# Patient Record
Sex: FEMALE | Employment: OTHER | URBAN - METROPOLITAN AREA
[De-identification: names, ages, dates, MRNs, and addresses within clinical notes are randomized per-mention and may not be internally consistent; named-entity substitution may affect disease eponyms.]

---

## 2017-01-01 ENCOUNTER — HOSPITAL ENCOUNTER (OUTPATIENT)
Dept: LAB | Age: 82
Discharge: HOME | End: 2017-09-27
Payer: MEDICARE

## 2017-01-01 ENCOUNTER — OFFICE VISIT (OUTPATIENT)
Dept: FAMILY MEDICINE CLINIC | Age: 82
End: 2017-01-01

## 2017-01-01 ENCOUNTER — TELEPHONE (OUTPATIENT)
Dept: FAMILY MEDICINE CLINIC | Age: 82
End: 2017-01-01

## 2017-01-01 ENCOUNTER — HOSPITAL ENCOUNTER (OUTPATIENT)
Dept: LAB | Age: 82
Discharge: HOME | End: 2017-12-18
Payer: MEDICARE

## 2017-01-01 ENCOUNTER — HOSPITAL ENCOUNTER (OUTPATIENT)
Dept: LAB | Age: 82
Discharge: HOME | End: 2017-11-20
Payer: MEDICARE

## 2017-01-01 VITALS
WEIGHT: 161 LBS | SYSTOLIC BLOOD PRESSURE: 130 MMHG | OXYGEN SATURATION: 99 % | TEMPERATURE: 97.6 F | BODY MASS INDEX: 27.49 KG/M2 | HEIGHT: 64 IN | DIASTOLIC BLOOD PRESSURE: 78 MMHG | RESPIRATION RATE: 18 BRPM | HEART RATE: 80 BPM

## 2017-01-01 VITALS
BODY MASS INDEX: 27.49 KG/M2 | HEART RATE: 81 BPM | DIASTOLIC BLOOD PRESSURE: 64 MMHG | RESPIRATION RATE: 17 BRPM | TEMPERATURE: 98.7 F | OXYGEN SATURATION: 96 % | WEIGHT: 161 LBS | HEIGHT: 64 IN | SYSTOLIC BLOOD PRESSURE: 138 MMHG

## 2017-01-01 DIAGNOSIS — I95.1 ORTHOSTATIC HYPOTENSION: ICD-10-CM

## 2017-01-01 DIAGNOSIS — T50.905A ADVERSE EFFECT OF DRUG, INITIAL ENCOUNTER: ICD-10-CM

## 2017-01-01 DIAGNOSIS — E11.9 TYPE 2 DIABETES MELLITUS WITHOUT COMPLICATION, WITHOUT LONG-TERM CURRENT USE OF INSULIN (HCC): ICD-10-CM

## 2017-01-01 DIAGNOSIS — G25.81 RLS (RESTLESS LEGS SYNDROME): ICD-10-CM

## 2017-01-01 DIAGNOSIS — I10 ESSENTIAL HYPERTENSION: ICD-10-CM

## 2017-01-01 DIAGNOSIS — E03.9 HYPOTHYROIDISM, UNSPECIFIED TYPE: ICD-10-CM

## 2017-01-01 DIAGNOSIS — G25.81 RLS (RESTLESS LEGS SYNDROME): Primary | ICD-10-CM

## 2017-01-01 DIAGNOSIS — I10 ESSENTIAL HYPERTENSION: Primary | ICD-10-CM

## 2017-01-01 DIAGNOSIS — R91.1 PULMONARY NODULE, RIGHT: ICD-10-CM

## 2017-01-01 DIAGNOSIS — N30.00 ACUTE CYSTITIS WITHOUT HEMATURIA: Primary | ICD-10-CM

## 2017-01-01 DIAGNOSIS — N30.00 ACUTE CYSTITIS WITHOUT HEMATURIA: ICD-10-CM

## 2017-01-01 LAB
ALBUMIN SERPL-MCNC: 3.9 G/DL (ref 3.4–5)
ALBUMIN/GLOB SERPL: 1.2 {RATIO} (ref 0.8–1.7)
ALP SERPL-CCNC: 85 U/L (ref 45–117)
ALT SERPL-CCNC: 24 U/L (ref 13–56)
ANION GAP SERPL CALC-SCNC: 10 MMOL/L (ref 3–18)
ANION GAP SERPL CALC-SCNC: 10 MMOL/L (ref 3–18)
AST SERPL-CCNC: 15 U/L (ref 15–37)
BACTERIA SPEC CULT: ABNORMAL
BILIRUB SERPL-MCNC: 0.4 MG/DL (ref 0.2–1)
BILIRUB UR QL STRIP: NEGATIVE
BUN SERPL-MCNC: 11 MG/DL (ref 7–18)
BUN SERPL-MCNC: 13 MG/DL (ref 7–18)
BUN/CREAT SERPL: 13 (ref 12–20)
BUN/CREAT SERPL: 13 (ref 12–20)
CALCIUM SERPL-MCNC: 8.8 MG/DL (ref 8.5–10.1)
CALCIUM SERPL-MCNC: 8.8 MG/DL (ref 8.5–10.1)
CHLORIDE SERPL-SCNC: 99 MMOL/L (ref 100–108)
CHLORIDE SERPL-SCNC: 99 MMOL/L (ref 100–108)
CHOLEST SERPL-MCNC: 201 MG/DL
CO2 SERPL-SCNC: 26 MMOL/L (ref 21–32)
CO2 SERPL-SCNC: 28 MMOL/L (ref 21–32)
CREAT SERPL-MCNC: 0.84 MG/DL (ref 0.6–1.3)
CREAT SERPL-MCNC: 1 MG/DL (ref 0.6–1.3)
EST. AVERAGE GLUCOSE BLD GHB EST-MCNC: 192 MG/DL
GLOBULIN SER CALC-MCNC: 3.3 G/DL (ref 2–4)
GLUCOSE SERPL-MCNC: 107 MG/DL (ref 74–99)
GLUCOSE SERPL-MCNC: 116 MG/DL (ref 74–99)
GLUCOSE UR-MCNC: NORMAL MG/DL
HBA1C MFR BLD: 8.3 % (ref 4.2–5.6)
HDLC SERPL-MCNC: 62 MG/DL (ref 40–60)
HDLC SERPL: 3.2 {RATIO} (ref 0–5)
KETONES P FAST UR STRIP-MCNC: NEGATIVE MG/DL
LDLC SERPL CALC-MCNC: 102.2 MG/DL (ref 0–100)
LIPID PROFILE,FLP: ABNORMAL
PH UR STRIP: 5.5 [PH] (ref 4.6–8)
POTASSIUM SERPL-SCNC: 4 MMOL/L (ref 3.5–5.5)
POTASSIUM SERPL-SCNC: 4 MMOL/L (ref 3.5–5.5)
PROT SERPL-MCNC: 7.2 G/DL (ref 6.4–8.2)
PROT UR QL STRIP: NORMAL
SERVICE CMNT-IMP: ABNORMAL
SODIUM SERPL-SCNC: 135 MMOL/L (ref 136–145)
SODIUM SERPL-SCNC: 137 MMOL/L (ref 136–145)
SP GR UR STRIP: 1.01 (ref 1–1.03)
TRIGL SERPL-MCNC: 184 MG/DL (ref ?–150)
UA UROBILINOGEN AMB POC: NORMAL (ref 0.2–1)
URINALYSIS CLARITY POC: CLEAR
URINALYSIS COLOR POC: YELLOW
URINE BLOOD POC: NORMAL
URINE LEUKOCYTES POC: NORMAL
URINE NITRITES POC: POSITIVE
VLDLC SERPL CALC-MCNC: 36.8 MG/DL

## 2017-01-01 PROCEDURE — 36415 COLL VENOUS BLD VENIPUNCTURE: CPT | Performed by: NURSE PRACTITIONER

## 2017-01-01 PROCEDURE — 80053 COMPREHEN METABOLIC PANEL: CPT | Performed by: NURSE PRACTITIONER

## 2017-01-01 PROCEDURE — 80061 LIPID PANEL: CPT | Performed by: NURSE PRACTITIONER

## 2017-01-01 PROCEDURE — 83036 HEMOGLOBIN GLYCOSYLATED A1C: CPT | Performed by: NURSE PRACTITIONER

## 2017-01-01 PROCEDURE — 87186 SC STD MICRODIL/AGAR DIL: CPT | Performed by: NURSE PRACTITIONER

## 2017-01-01 PROCEDURE — 87077 CULTURE AEROBIC IDENTIFY: CPT | Performed by: NURSE PRACTITIONER

## 2017-01-01 PROCEDURE — 87086 URINE CULTURE/COLONY COUNT: CPT | Performed by: NURSE PRACTITIONER

## 2017-01-01 PROCEDURE — 80048 BASIC METABOLIC PNL TOTAL CA: CPT | Performed by: NURSE PRACTITIONER

## 2017-01-01 RX ORDER — LOSARTAN POTASSIUM 50 MG/1
TABLET ORAL
Qty: 90 TAB | Refills: 1 | Status: SHIPPED | OUTPATIENT
Start: 2017-01-01 | End: 2018-01-01 | Stop reason: SDUPTHER

## 2017-01-01 RX ORDER — GABAPENTIN 300 MG/1
CAPSULE ORAL
Qty: 60 CAP | Refills: 0 | Status: SHIPPED | OUTPATIENT
Start: 2017-01-01 | End: 2017-01-01 | Stop reason: DRUGHIGH

## 2017-01-01 RX ORDER — ROPINIROLE 1 MG/1
TABLET, FILM COATED ORAL
Qty: 180 TAB | Refills: 2 | Status: SHIPPED | OUTPATIENT
Start: 2017-01-01 | End: 2017-01-01 | Stop reason: SINTOL

## 2017-01-01 RX ORDER — GABAPENTIN 300 MG/1
900 CAPSULE ORAL
Qty: 270 CAP | Refills: 2 | Status: SHIPPED | OUTPATIENT
Start: 2017-01-01 | End: 2017-01-01 | Stop reason: SDUPTHER

## 2017-01-01 RX ORDER — TRIMETHOPRIM 100 MG/1
100 TABLET ORAL DAILY
COMMUNITY
End: 2018-01-01

## 2017-01-01 RX ORDER — GABAPENTIN 300 MG/1
900 CAPSULE ORAL
Qty: 270 CAP | Refills: 2 | Status: SHIPPED | OUTPATIENT
Start: 2017-01-01

## 2017-01-01 RX ORDER — LEVOTHYROXINE SODIUM 100 UG/1
TABLET ORAL
Qty: 90 TAB | Refills: 3 | Status: SHIPPED | OUTPATIENT
Start: 2017-01-01

## 2017-01-01 RX ORDER — NITROFURANTOIN 25; 75 MG/1; MG/1
100 CAPSULE ORAL 2 TIMES DAILY
Qty: 14 CAP | Refills: 0 | Status: SHIPPED | OUTPATIENT
Start: 2017-01-01 | End: 2018-01-01

## 2017-01-13 ENCOUNTER — HOSPITAL ENCOUNTER (OUTPATIENT)
Dept: LAB | Age: 82
Discharge: HOME | End: 2017-01-13
Payer: MEDICARE

## 2017-01-13 DIAGNOSIS — E11.9 TYPE 2 DIABETES MELLITUS WITHOUT COMPLICATION, WITHOUT LONG-TERM CURRENT USE OF INSULIN (HCC): ICD-10-CM

## 2017-01-13 DIAGNOSIS — E03.9 HYPOTHYROIDISM, UNSPECIFIED TYPE: ICD-10-CM

## 2017-01-13 LAB
EST. AVERAGE GLUCOSE BLD GHB EST-MCNC: 243 MG/DL
HBA1C MFR BLD: 10.1 % (ref 4.2–5.6)
TSH SERPL DL<=0.05 MIU/L-ACNC: 2.61 UIU/ML (ref 0.36–3.74)

## 2017-01-13 PROCEDURE — 36415 COLL VENOUS BLD VENIPUNCTURE: CPT | Performed by: NURSE PRACTITIONER

## 2017-01-13 PROCEDURE — 83036 HEMOGLOBIN GLYCOSYLATED A1C: CPT | Performed by: NURSE PRACTITIONER

## 2017-01-13 PROCEDURE — 84443 ASSAY THYROID STIM HORMONE: CPT | Performed by: NURSE PRACTITIONER

## 2017-01-16 ENCOUNTER — TELEPHONE (OUTPATIENT)
Dept: FAMILY MEDICINE CLINIC | Age: 82
End: 2017-01-16

## 2017-01-16 NOTE — TELEPHONE ENCOUNTER
----- Message from Henri Linder NP sent at 1/16/2017  8:47 AM EST -----  Please call patient and advise that her hemoglobin A 1 C is 10.1% and ask if she is taking her medications and if yes what and how much. She is not at goal with her diabetes and I need to know what she is taking to better make decisions with her to get her hemoglobin A 1 C to less than 9 %  Note she has been non complaint with taking medications.    Henri MASONNP

## 2017-01-16 NOTE — TELEPHONE ENCOUNTER
LVM adv pt A1C results, request pt to call and adv if she is taking her meds and what and how much she is taking.

## 2017-03-10 PROBLEM — N95.2 ATROPHIC VAGINITIS: Status: ACTIVE | Noted: 2017-03-10

## 2017-03-26 RX ORDER — LOSARTAN POTASSIUM 100 MG/1
TABLET ORAL
Qty: 90 TAB | Refills: 1 | Status: SHIPPED | OUTPATIENT
Start: 2017-03-26 | End: 2017-04-13 | Stop reason: DRUGHIGH

## 2017-03-31 DIAGNOSIS — G25.81 RLS (RESTLESS LEGS SYNDROME): ICD-10-CM

## 2017-03-31 RX ORDER — ROPINIROLE 1 MG/1
TABLET, FILM COATED ORAL
Qty: 180 TAB | Refills: 2 | Status: SHIPPED | OUTPATIENT
Start: 2017-03-31 | End: 2017-01-01 | Stop reason: SDUPTHER

## 2017-04-13 ENCOUNTER — HOSPITAL ENCOUNTER (OUTPATIENT)
Dept: LAB | Age: 82
Discharge: HOME | End: 2017-04-13
Payer: MEDICARE

## 2017-04-13 ENCOUNTER — OFFICE VISIT (OUTPATIENT)
Dept: FAMILY MEDICINE CLINIC | Age: 82
End: 2017-04-13

## 2017-04-13 VITALS
OXYGEN SATURATION: 98 % | WEIGHT: 167 LBS | BODY MASS INDEX: 28.51 KG/M2 | HEIGHT: 64 IN | SYSTOLIC BLOOD PRESSURE: 110 MMHG | TEMPERATURE: 98.1 F | DIASTOLIC BLOOD PRESSURE: 70 MMHG | HEART RATE: 68 BPM | RESPIRATION RATE: 16 BRPM

## 2017-04-13 DIAGNOSIS — Z12.31 ENCOUNTER FOR SCREENING MAMMOGRAM FOR MALIGNANT NEOPLASM OF BREAST: ICD-10-CM

## 2017-04-13 DIAGNOSIS — E11.9 TYPE 2 DIABETES MELLITUS WITHOUT COMPLICATION, WITHOUT LONG-TERM CURRENT USE OF INSULIN (HCC): Primary | ICD-10-CM

## 2017-04-13 DIAGNOSIS — I10 ESSENTIAL HYPERTENSION: ICD-10-CM

## 2017-04-13 DIAGNOSIS — E11.9 TYPE 2 DIABETES MELLITUS WITHOUT COMPLICATION, WITHOUT LONG-TERM CURRENT USE OF INSULIN (HCC): ICD-10-CM

## 2017-04-13 DIAGNOSIS — I95.1 ORTHOSTATIC HYPOTENSION: ICD-10-CM

## 2017-04-13 LAB
CHOLEST SERPL-MCNC: 209 MG/DL
HDLC SERPL-MCNC: 54 MG/DL (ref 40–60)
HDLC SERPL: 3.9 {RATIO} (ref 0–5)
LDLC SERPL CALC-MCNC: 119.6 MG/DL (ref 0–100)
LIPID PROFILE,FLP: ABNORMAL
TRIGL SERPL-MCNC: 177 MG/DL (ref ?–150)
VLDLC SERPL CALC-MCNC: 35.4 MG/DL

## 2017-04-13 PROCEDURE — 36415 COLL VENOUS BLD VENIPUNCTURE: CPT | Performed by: NURSE PRACTITIONER

## 2017-04-13 PROCEDURE — 80061 LIPID PANEL: CPT | Performed by: NURSE PRACTITIONER

## 2017-04-13 RX ORDER — LOSARTAN POTASSIUM 50 MG/1
50 TABLET ORAL DAILY
Qty: 90 TAB | Refills: 1 | Status: SHIPPED | OUTPATIENT
Start: 2017-04-13 | End: 2017-01-01 | Stop reason: SDUPTHER

## 2017-04-13 NOTE — PROGRESS NOTES
1. Have you been to the ER, urgent care clinic since your last visit? Hospitalized since your last visit? No    2. Have you seen or consulted any other health care providers outside of the [facility] since your last visit? Include any pap smears or colon screening.  No

## 2017-04-13 NOTE — MR AVS SNAPSHOT
Visit Information Date & Time Provider Department Dept. Phone Encounter #  
 4/13/2017  9:40 AM Samuel Fabian, [address] [id number] Follow-up Instructions Return in about 4 weeks (around 5/11/2017) for blood pressure check since change in blood pressure. 6/28/2017  1:30 PM  
Any with Lisa Kelley MD  
[facility] ([facility]) Appt Note: 3 mo fu  
 [address] 
[address] [place] [address]  
[address] [facility] [address] Upcoming Health Maintenance Date Due  
 EYE EXAM RETINAL OR DILATED Q1 12/12/2017* Pneumococcal 65+ Low/Medium Risk (2 of 2 - PPSV23) 6/21/2017 HEMOGLOBIN A1C Q6M 7/13/2017 MICROALBUMIN Q1 8/9/2017 GLAUCOMA SCREENING Q2Y 10/27/2017 FOOT EXAM Q1 4/13/2018 BREAST CANCER SCRN MAMMOGRAM 4/13/2018 LIPID PANEL Q1 4/13/2018 MEDICARE YEARLY EXAM 4/14/2018 DTaP/Tdap/Td series (2 - Td) 4/13/2027 *Topic was postponed. The date shown is not the original due date. Allergies as of 4/13/2017  Review Complete On: 4/13/2017 By: Deshawn Chun LPN Severity Noted Reaction Type Reactions Phenergan [Promethazine] High 09/22/2014   Side Effect Other (comments) Made patient very hyper and extremely agitated Current Immunizations  Reviewed on 4/11/2016 Name Date Influenza Vaccine PF 9/16/2013 Influenza Vaccine Split 10/7/2011 Not reviewed this visit You Were Diagnosed With   
  
 Codes Comments Type 2 diabetes mellitus without complication, without long-term current use of insulin (HCC)    -  Primary ICD-10-CM: E11.9 ICD-9-CM: 250.00 Essential hypertension     ICD-10-CM: I10 
ICD-9-CM: 401.9 Encounter for screening mammogram for malignant neoplasm of breast     ICD-10-CM: Z12.31 
ICD-9-CM: V76.12 Orthostatic hypotension     ICD-10-CM: I95.1 ICD-9-CM: 458.0 Vitals BP Pulse Temp Resp Height(growth percentile) Weight(growth percentile) 110/70 (BP 1 Location: Right arm, BP Patient Position: Standing) 68 98.1 °F (36.7 °C) (Oral) 16 5' 4\" (1.626 m) 167 lb (75.8 kg) SpO2 BMI OB Status Smoking Status 98% 28.67 kg/m2 Postmenopausal Former Smoker Vitals History BMI and BSA Data Body Mass Index Body Surface Area  
 28.67 kg/m 2 1.85 m 2 Preferred Pharmacy Pharmacy Name Phone [address] [facility] [phone] Your Updated Medication List  
  
   
This list is accurate as of: 4/13/17 10:12 AM.  Always use your most recent med list.  
  
  
  
  
 aspirin delayed-release 81 mg tablet Take 81 mg by mouth daily. BIOTIN PO Take  by mouth. CALTRATE 600 PO Take  by mouth. conjugated estrogens 0.625 mg/gram vaginal cream  
Commonly known as:  PREMARIN Insert 0.5 g into vagina every seven (7) days. escitalopram oxalate 10 mg tablet Commonly known as:  Tod Samson TAKE 1 TABLET DAILY  
  
 hydroCHLOROthiazide 25 mg tablet Commonly known as:  HYDRODIURIL  
TAKE 1 TABLET DAILY  
  
 levothyroxine 100 mcg tablet Commonly known as:  SYNTHROID Take 1 Tab by mouth Daily (before breakfast). LORazepam 0.5 mg tablet Commonly known as:  ATIVAN Take 1 Tab by mouth every eight (8) hours as needed for Anxiety. losartan 50 mg tablet Commonly known as:  COZAAR Take 1 Tab by mouth daily. metFORMIN 500 mg tablet Commonly known as:  GLUCOPHAGE  
TAKE 1 TABLET TWICE A DAY WITH MEALS  
  
 phenazopyridine 100 mg tablet Commonly known as:  PYRIDIUM Take 1 Tab by mouth three (3) times daily as needed for Pain. PRESERVISION AREDS PO Take  by mouth. red yeast rice extract 600 mg Cap Take 600 mg by mouth now. rOPINIRole 1 mg tablet Commonly known as:  REQUIP  
TAKE 1 TABLET TWICE A DAY  
  
  
  
  
 Prescriptions Sent to Pharmacy Refills  
 losartan (COZAAR) 50 mg tablet 1 Sig: Take 1 Tab by mouth daily. Class: Normal  
 Pharmacy: [address], [address] [place] [address] #: [phone] Route: Oral  
  
We Performed the Following  DIABETES FOOT EXAM [7 Custom] Follow-up Instructions Return in about 4 weeks (around 5/11/2017) for blood pressure check since change in blood pressure. To-Do List   
 04/13/2017 Lab:  LIPID PANEL   
  
 04/16/2017 Imaging:  [facility] MAMMO BI SCREENING INCL CAD Patient Instructions Benign Paroxysmal Positional Vertigo (BPPV): Care Instructions Your Care Instructions Benign paroxysmal positional vertigo, also called BPPV, is an inner ear problem. It causes a spinning or whirling sensation when you move your head. This sensation is called vertigo. The vertigo usually lasts for less than a minute. People often have vertigo spells for a few days or weeks. Then the vertigo goes away. But it may come back again. The vertigo may be mild, or it may be bad enough to cause unsteadiness, nausea, and vomiting. When you move, your inner ear sends messages to the brain. This helps you keep your balance. Vertigo can happen when debris builds up in the inner ear. The buildup can cause the inner ear to send the wrong message to the brain. Your doctor may move you in different positions to help your vertigo get better faster. This is called the Epley maneuver. Your doctor may also prescribe medicines or exercises to help with your symptoms. Follow-up care is a key part of your treatment and safety. Be sure to make and go to all appointments, and call your doctor if you are having problems. It's also a good idea to know your test results and keep a list of the medicines you take. How can you care for yourself at home? · If your doctor suggests that you do Adkins-Daroff exercises: ¨ Sit on the edge of a bed or sofa. Quickly lie down on the side that causes the worst vertigo. Lie on your side with your ear down. ¨ Stay in this position for at least 30 seconds or until the vertigo goes away. ¨ Sit up. If this causes vertigo, wait for it to stop. ¨ Repeat the procedure on the other side. ¨ Repeat this 10 times. Do these exercises 2 times a day until the vertigo is gone. When should you call for help? Call 911 anytime you think you may need emergency care. For example, call if: 
· You have symptoms of a stroke. These may include: 
¨ Sudden numbness, tingling, weakness, or loss of movement in your face, arm, or leg, especially on only one side of your body. ¨ Sudden vision changes. ¨ Sudden trouble speaking. ¨ Sudden confusion or trouble understanding simple statements. ¨ Sudden problems with walking or balance. ¨ A sudden, severe headache that is different from past headaches. Call your doctor now or seek immediate medical care if: 
· You have new or worse nausea and vomiting. · You have new symptoms such as hearing loss or roaring in your ears. Watch closely for changes in your health, and be sure to contact your doctor if: 
· You are not getting better as expected. · Your vertigo gets worse. Where can you learn more? Go to http://joey-andrey.info/. Enter  in the search box to learn more about \"Benign Paroxysmal Positional Vertigo (BPPV): Care Instructions. \" Current as of: July 29, 2016 Content Version: 11.2 © 2435-1377 [employer]. Care instructions adapted under license by [employer] (which disclaims liability or warranty for this information). If you have questions about a medical condition or this instruction, always ask your healthcare professional. [place] [address] any warranty or liability for your use of this information. Orthostatic Hypotension: Care Instructions Your Care Instructions Orthostatic hypotension is a quick drop in blood pressure. It happens when you get up from sitting or lying down. You may feel faint, lightheaded, or dizzy. When a person sits up or stands up, the body changes the way it pumps blood. This can slow the flow of blood to the brain for a very short time. And that can make you feel lightheaded. Many medicines can cause this problem, especially in older people. Lack of fluids (dehydration) or illnesses such as diabetes or heart disease also can cause it. Follow-up care is a key part of your treatment and safety. Be sure to make and go to all appointments, and call your doctor if you are having problems. It's also a good idea to know your test results and keep a list of the medicines you take. How can you care for yourself at home? · Tell your doctor about any problems you have with your medicines. · If your doctor prescribes medicine to help prevent a low blood pressure problem, take it exactly as prescribed. Call your doctor if you think you are having a problem with your medicine. · Drink plenty of fluids, enough so that your urine is light yellow or clear like water. Choose water and other caffeine-free clear liquids. If you have kidney, heart, or liver disease and have to limit fluids, talk with your doctor before you increase the amount of fluids you drink. · Limit or avoid alcohol and caffeine. · Get up slowly from bed or after sitting for a long time. If you are in bed, roll to your side and swing your legs over the edge of the bed and onto the floor. Push your body up to a sitting position. Wait for a while before you slowly stand up. If you are dizzy or lightheaded, sit or lie down. When should you call for help? Call 911 anytime you think you may need emergency care. For example, call if: 
· You passed out (lost consciousness). Watch closely for changes in your health, and be sure to contact your doctor if: 
· You do not get better as expected. Where can you learn more? Go to http://joey-andrey.info/. Enter Q659 in the search box to learn more about \"Orthostatic Hypotension: Care Instructions. \" Current as of: January 27, 2016 Content Version: 11.2 © 6805-8041 [employer]. Care instructions adapted under license by [employer] (which disclaims liability or warranty for this information). If you have questions about a medical condition or this instruction, always ask your healthcare professional. [place] [address] any warranty or liability for your use of this information. Please provide this summary of care documentation to your next provider. Your primary care clinician is listed as [address]. If you have any questions after today's visit, please call [phone].

## 2017-04-13 NOTE — PROGRESS NOTES
Subjective:    Myrna Smalls is a 80y.o. year old female seen for follow up of diabetes. She also has hypertension and hyperlipidemia. Diabetic Review of Systems - medication compliance: compliant all of the time, diabetic diet compliance: compliant most of the time, home glucose monitoring: is not performed, further diabetic ROS: no polyuria or polydipsia, no chest pain, dyspnea or TIA's, no numbness, tingling or pain in extremities, last eye exam approximately overdue and patient declines. acute symptoms are none. Other symptoms and concerns: patient is due for medicare wellness and diagnostics and declines all but mammography   Has been seeing urology Dr. Leah Alfred for recurrent UTIs. Just finished cephalaxin yesterday and will see him tomorrow for repeat urinalysis but denies urinary symptoms. Patient reports that she has noticed that when she stands that she becomes lightheaded for the past months and that she has lost weight and her blood pressures are lower than they used to be. She also acknowledges that she has one fall over a month ago in her drive way. ROS: +dizziness before fall, denies loss of consciousness, denies falls since   Denies gait difficulties.   Wt Readings from Last 3 Encounters:   04/13/17 167 lb (75.8 kg)   03/24/17 169 lb (76.7 kg)   03/10/17 169 lb (76.7 kg)       Patient Active Problem List   Diagnosis Code    HTN (hypertension) I10    Hypothyroidism E03.9    Hearing loss H91.90    Hypercholesteremia E78.00    Vertigo R42    RLS (restless legs syndrome) G25.81    Left hip pain M25.552    History of UTI Z87.440    Osteoarthrosis, unspecified whether generalized or localized, lower leg M17.10    Recurrent UTI N39.0    Essential hypertension I10    Type 2 diabetes mellitus without complication, without long-term current use of insulin ([facility] [address] E11.9    Medicare annual wellness visit, initial Z00.00    Atrophic vaginitis N95.2     Current Outpatient Prescriptions Medication Sig Dispense Refill    rOPINIRole (REQUIP) 1 mg tablet TAKE 1 TABLET TWICE A  Tab 2    losartan (COZAAR) 100 mg tablet TAKE 1 TABLET DAILY 90 Tab 1    phenazopyridine (PYRIDIUM) 100 mg tablet Take 1 Tab by mouth three (3) times daily as needed for Pain. 12 Tab 0    levothyroxine (SYNTHROID) 100 mcg tablet Take 1 Tab by mouth Daily (before breakfast). 90 Tab 3    hydroCHLOROthiazide (HYDRODIURIL) 25 mg tablet TAKE 1 TABLET DAILY 90 Tab 1    conjugated estrogens (PREMARIN) 0.625 mg/gram vaginal cream Insert 0.5 g into vagina every seven (7) days. 45 g 3    metFORMIN (GLUCOPHAGE) 500 mg tablet TAKE 1 TABLET TWICE A DAY WITH MEALS 180 Tab 2    escitalopram oxalate (LEXAPRO) 10 mg tablet TAKE 1 TABLET DAILY 90 Tab 3    red yeast rice extract 600 mg cap Take 600 mg by mouth now.  CALCIUM CARBONATE (CALTRATE 600 PO) Take  by mouth.  VIT A/VIT C/VIT E/ZINC/COPPER (PRESERVISION AREDS PO) Take  by mouth.  LORazepam (ATIVAN) 0.5 mg tablet Take 1 Tab by mouth every eight (8) hours as needed for Anxiety. 60 Tab 0    BIOTIN PO Take  by mouth.  aspirin delayed-release 81 mg tablet Take 81 mg by mouth daily.         Allergies   Allergen Reactions    Phenergan [Promethazine] Other (comments)     Made patient very hyper and extremely agitated       Past Surgical History:   Procedure Laterality Date    HX HEENT      Left stapedectomy 10/03    HX HEENT  7/2012    eye lids lifted Dr. 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    HX ORTHOPAEDIC      Right hip replacement 2004     Family History   Problem Relation Age of Onset    Breast Cancer Sister 70    Heart Attack Father       Lab Results   Component Value Date/Time    Cholesterol, total 222 04/11/2016 10:00 AM    Cholesterol (POC) 242 07/13/2012 10:34 AM    HDL Cholesterol 53 04/11/2016 10:00 AM    HDL Cholesterol (POC) 27 07/13/2012 10:34 AM    LDL, calculated 122.4 04/11/2016 10:00 AM    VLDL, calculated 46.6 04/11/2016 10:00 AM    Triglyceride 233 04/11/2016 10:00 AM    Triglycerides (POC) 246 07/13/2012 10:34 AM    CHOL/HDL Ratio 4.2 04/11/2016 10:00 AM     Lab Results   Component Value Date/Time    Sodium 137 04/11/2016 10:00 AM    Potassium 3.9 04/11/2016 10:00 AM    Chloride 99 04/11/2016 10:00 AM    CO2 29 04/11/2016 10:00 AM    Anion gap 9 04/11/2016 10:00 AM    Glucose 195 04/11/2016 10:00 AM    BUN 19 04/11/2016 10:00 AM    Creatinine 1.00 04/11/2016 10:00 AM    BUN/Creatinine ratio 19 04/11/2016 10:00 AM    GFR est AA >60 04/11/2016 10:00 AM    GFR est non-AA 54 04/11/2016 10:00 AM    Calcium 9.0 04/11/2016 10:00 AM    Bilirubin, total 0.6 04/11/2016 10:00 AM    AST (SGOT) 18 04/11/2016 10:00 AM    Alk. phosphatase 86 04/11/2016 10:00 AM    Protein, total 7.3 04/11/2016 10:00 AM    Albumin 4.2 04/11/2016 10:00 AM    Globulin 3.1 04/11/2016 10:00 AM    A-G Ratio 1.4 04/11/2016 10:00 AM    ALT (SGPT) 29 04/11/2016 10:00 AM     Lab Results   Component Value Date/Time    WBC 5.3 11/10/2016 01:00 PM    HGB 13.3 11/10/2016 01:00 PM    HCT 39.9 11/10/2016 01:00 PM    PLATELET 96 52/01/0349 01:00 PM    MCV 90.5 11/10/2016 01:00 PM     Wt Readings from Last 3 Encounters:   04/13/17 167 lb (75.8 kg)   03/24/17 169 lb (76.7 kg)   03/10/17 169 lb (76.7 kg)     Last Point of Care HGB A1C  Hemoglobin A1c (POC)   Date Value Ref Range Status   12/30/2016 10.3 % Final      BP Readings from Last 3 Encounters:   04/13/17 112/58   03/24/17 112/62   03/10/17 120/80       Objective:  Visit Vitals    /70 (BP 1 Location: Right arm, BP Patient Position: Standing)    Pulse 68    Temp 98.1 °F (36.7 °C) (Oral)    Resp 16    Ht 5' 4\" (1.626 m)    Wt 167 lb (75.8 kg)    SpO2 98%    BMI 28.67 kg/m2     Awake and alert in no acute distress   Neck supple without lymphadenopathy, no carotid artery bruits auscultated bilaterally.    No thyromegaly   Lungs clear throughout   S1 S2 RRR without ectopy or murmur auscultated. Extremities: no clubbing, cyanosis, peripheral edema   Neuro no focal signes  Integumentary: no rashes  Normal skin turgro  Reviewed vital signs           Diabetic foot exam:     Left: Reflexes 1+     Vibratory sensation normal    Proprioception normal   Sharp/dull discrimination normal    Filament test reduced sensation monofilament heel only  Right: Reflexes 1+   Vibratory sensation normal   Proprioception normal   Sharp/dull discrimination normal   Filament test reduced sensation heel only  Paola was seen today for diabetes. Diagnoses and all orders for this visit:    Type 2 diabetes mellitus without complication, without long-term current use of insulin ([facility])  -      DIABETES FOOT EXAM  -     LIPID PANEL; Future    Essential hypertension    Encounter for screening mammogram for malignant neoplasm of breast  -     Bilateral Digital Screening Mammography; Future    Orthostatic hypotension  -     losartan (COZAAR) 50 mg tablet; Take 1 Tab by mouth daily. Anticipatory guidance given  Fall prevention  Patient verbalizes understanding. I have discussed the diagnosis with the patient and the intended plan as seen in the above orders. The patient has received an after-visit summary and questions were answered concerning future plans. I have discussed medication side effects and warnings with the patient as well. Follow-up Disposition:  Return in about 4 weeks (around 5/11/2017) for blood pressure check since change in blood pressure.

## 2017-04-13 NOTE — PATIENT INSTRUCTIONS
Benign Paroxysmal Positional Vertigo (BPPV): Care Instructions  Your Care Instructions    Benign paroxysmal positional vertigo, also called BPPV, is an inner ear problem. It causes a spinning or whirling sensation when you move your head. This sensation is called vertigo. The vertigo usually lasts for less than a minute. People often have vertigo spells for a few days or weeks. Then the vertigo goes away. But it may come back again. The vertigo may be mild, or it may be bad enough to cause unsteadiness, nausea, and vomiting. When you move, your inner ear sends messages to the brain. This helps you keep your balance. Vertigo can happen when debris builds up in the inner ear. The buildup can cause the inner ear to send the wrong message to the brain. Your doctor may move you in different positions to help your vertigo get better faster. This is called the Epley maneuver. Your doctor may also prescribe medicines or exercises to help with your symptoms. Follow-up care is a key part of your treatment and safety. Be sure to make and go to all appointments, and call your doctor if you are having problems. It's also a good idea to know your test results and keep a list of the medicines you take. How can you care for yourself at home? · If your doctor suggests that you do Adkins-Daroff exercises:  ¨ Sit on the edge of a bed or sofa. Quickly lie down on the side that causes the worst vertigo. Lie on your side with your ear down. ¨ Stay in this position for at least 30 seconds or until the vertigo goes away. ¨ Sit up. If this causes vertigo, wait for it to stop. ¨ Repeat the procedure on the other side. ¨ Repeat this 10 times. Do these exercises 2 times a day until the vertigo is gone. When should you call for help? Call 911 anytime you think you may need emergency care. For example, call if:  · You have symptoms of a stroke.  These may include:  ¨ Sudden numbness, tingling, weakness, or loss of movement in your face, arm, or leg, especially on only one side of your body. ¨ Sudden vision changes. ¨ Sudden trouble speaking. ¨ Sudden confusion or trouble understanding simple statements. ¨ Sudden problems with walking or balance. ¨ A sudden, severe headache that is different from past headaches. Call your doctor now or seek immediate medical care if:  · You have new or worse nausea and vomiting. · You have new symptoms such as hearing loss or roaring in your ears. Watch closely for changes in your health, and be sure to contact your doctor if:  · You are not getting better as expected. · Your vertigo gets worse. Where can you learn more? Go to http://joey"[employer]andrey.info/. Enter  in the search box to learn more about \"Benign Paroxysmal Positional Vertigo (BPPV): Care Instructions. \"  Current as of: July 29, 2016  Content Version: 11.2  © 9684-8184 [employer]. Care instructions adapted under license by [employer] (which disclaims liability or warranty for this information). If you have questions about a medical condition or this instruction, always ask your healthcare professional. [place] [address] any warranty or liability for your use of this information. Orthostatic Hypotension: Care Instructions  Your Care Instructions  Orthostatic hypotension is a quick drop in blood pressure. It happens when you get up from sitting or lying down. You may feel faint, lightheaded, or dizzy. When a person sits up or stands up, the body changes the way it pumps blood. This can slow the flow of blood to the brain for a very short time. And that can make you feel lightheaded. Many medicines can cause this problem, especially in older people. Lack of fluids (dehydration) or illnesses such as diabetes or heart disease also can cause it. Follow-up care is a key part of your treatment and safety.  Be sure to make and go to all appointments, and call your doctor if you are having problems. It's also a good idea to know your test results and keep a list of the medicines you take. How can you care for yourself at home? · Tell your doctor about any problems you have with your medicines. · If your doctor prescribes medicine to help prevent a low blood pressure problem, take it exactly as prescribed. Call your doctor if you think you are having a problem with your medicine. · Drink plenty of fluids, enough so that your urine is light yellow or clear like water. Choose water and other caffeine-free clear liquids. If you have kidney, heart, or liver disease and have to limit fluids, talk with your doctor before you increase the amount of fluids you drink. · Limit or avoid alcohol and caffeine. · Get up slowly from bed or after sitting for a long time. If you are in bed, roll to your side and swing your legs over the edge of the bed and onto the floor. Push your body up to a sitting position. Wait for a while before you slowly stand up. If you are dizzy or lightheaded, sit or lie down. When should you call for help? Call 911 anytime you think you may need emergency care. For example, call if:  · You passed out (lost consciousness). Watch closely for changes in your health, and be sure to contact your doctor if:  · You do not get better as expected. Where can you learn more? Go to http://joey-andrey.info/. Enter B913 in the search box to learn more about \"Orthostatic Hypotension: Care Instructions. \"  Current as of: January 27, 2016  Content Version: 11.2  © 7600-0621 [employer]. Care instructions adapted under license by [employer] (which disclaims liability or warranty for this information). If you have questions about a medical condition or this instruction, always ask your healthcare professional. [address] any warranty or liability for your use of this information.

## 2017-04-13 NOTE — ACP (ADVANCE CARE PLANNING)
Advance Care Planning (ACP) Provider Conversation Snapshot    Date of ACP Conversation: 04/13/17  Persons included in Conversation:  patient  Length of ACP Conversation in minutes:  <16 minutes (Non-Billable)    Authorized Decision Maker (if patient is incapable of making informed decisions): This person is:   Healthcare Agent/Medical Power of  under Advance Directive          For Patients with Decision Making Capacity:   Values/Goals: Exploration of values, goals, and preferences if recovery is not expected, even with continued medical treatment in the event of:  Imminent death  Severe, permanent brain injury  \"In these circumstances, what matters most to you? \"  Care focused more on comfort or quality of life.     Conversation Outcomes / Follow-Up Plan:   Recommended completion of Advance Directive form after review of ACP materials and conversation with prospective healthcare agent

## 2017-04-19 ENCOUNTER — HOSPITAL ENCOUNTER (OUTPATIENT)
Dept: MAMMOGRAPHY | Age: 82
Discharge: HOME | End: 2017-04-19
Attending: NURSE PRACTITIONER
Payer: MEDICARE

## 2017-04-19 DIAGNOSIS — Z12.31 ENCOUNTER FOR SCREENING MAMMOGRAM FOR MALIGNANT NEOPLASM OF BREAST: ICD-10-CM

## 2017-04-19 PROCEDURE — 77067 SCR MAMMO BI INCL CAD: CPT

## 2017-05-11 ENCOUNTER — TELEPHONE (OUTPATIENT)
Dept: FAMILY MEDICINE CLINIC | Age: 82
End: 2017-05-11

## 2017-05-12 NOTE — TELEPHONE ENCOUNTER
Spoke with patient and she would like to know her lab results from April. I informed her the only lab result i had for her was her lipid panel. She was thinking that an A1c was drawn to. I informed her that i do not see that one in the system and to discuss with Dom at her next appointment. Patient verbally understood.

## 2017-05-22 ENCOUNTER — OFFICE VISIT (OUTPATIENT)
Dept: FAMILY MEDICINE CLINIC | Age: 82
End: 2017-05-22

## 2017-05-22 ENCOUNTER — HOSPITAL ENCOUNTER (OUTPATIENT)
Dept: LAB | Age: 82
Discharge: HOME | End: 2017-05-22
Payer: MEDICARE

## 2017-05-22 VITALS
HEIGHT: 64 IN | HEART RATE: 87 BPM | BODY MASS INDEX: 28.85 KG/M2 | RESPIRATION RATE: 16 BRPM | OXYGEN SATURATION: 98 % | SYSTOLIC BLOOD PRESSURE: 110 MMHG | WEIGHT: 169 LBS | TEMPERATURE: 97.8 F | DIASTOLIC BLOOD PRESSURE: 82 MMHG

## 2017-05-22 DIAGNOSIS — R35.0 URINARY FREQUENCY: ICD-10-CM

## 2017-05-22 DIAGNOSIS — I10 ESSENTIAL HYPERTENSION: ICD-10-CM

## 2017-05-22 DIAGNOSIS — N30.01 ACUTE CYSTITIS WITH HEMATURIA: Primary | ICD-10-CM

## 2017-05-22 LAB
BILIRUB UR QL STRIP: NORMAL
GLUCOSE UR-MCNC: NORMAL MG/DL
KETONES P FAST UR STRIP-MCNC: NEGATIVE MG/DL
PH UR STRIP: 5.5 [PH] (ref 4.6–8)
PROT UR QL STRIP: NORMAL MG/DL
SP GR UR STRIP: 1.01 (ref 1–1.03)
UA UROBILINOGEN AMB POC: NORMAL (ref 0.2–1)
URINALYSIS CLARITY POC: NORMAL
URINALYSIS COLOR POC: YELLOW
URINE BLOOD POC: NORMAL
URINE LEUKOCYTES POC: NORMAL
URINE NITRITES POC: NEGATIVE

## 2017-05-22 PROCEDURE — 87086 URINE CULTURE/COLONY COUNT: CPT | Performed by: NURSE PRACTITIONER

## 2017-05-22 RX ORDER — METFORMIN HYDROCHLORIDE 1000 MG/1
1000 TABLET ORAL 2 TIMES DAILY WITH MEALS
COMMUNITY
End: 2018-01-01 | Stop reason: SDUPTHER

## 2017-05-22 RX ORDER — AMOXICILLIN AND CLAVULANATE POTASSIUM 875; 125 MG/1; MG/1
1 TABLET, FILM COATED ORAL 2 TIMES DAILY
Qty: 14 TAB | Refills: 0 | Status: SHIPPED | OUTPATIENT
Start: 2017-05-22 | End: 2017-05-29

## 2017-05-22 NOTE — PROGRESS NOTES
Subjective:   Isaac Stovall is a 80 y.o. female here today to follow up hypotension since decrease of losartan dosage. Patient reports no further symptoms and feels good with regards to hypertension management. ROS: denies lightheadedness or dizziness  New concern: patient reports dysuria and urinary frequency for past several weeks. Has urologist and has not been able to get appointment. Is using premarin vaginal as recommended. ROS: denies fever,chills, hematuria, +pelvic pressure denies back pain. PMH: reviewed medications and allergy lists and medical and family history. OBJECTIVE:  Awake and alert in no acute distress  Neck supple without lymphadenopathy, no carotid artery bruits auscultated bilaterally. No thyromegaly  Lungs clear throughout  S1 S2 RRR without ectopy or murmur auscultated. Abdomen: normoactive bowel sounds all quadrants, +suprapubic tenderness, negative CVAT bilaterally. Extremities: no clubbing, cyanosis, peripheral edema    Visit Vitals    /82 (BP 1 Location: Left arm, BP Patient Position: Sitting)    Pulse 87    Temp 97.8 °F (36.6 °C) (Oral)    Resp 16    Ht 5' 4\" (1.626 m)    Wt 169 lb (76.7 kg)    SpO2 98%    BMI 29.01 kg/m2     Results for orders placed or performed in visit on 05/22/17   AMB POC URINALYSIS DIP STICK AUTO W/O MICRO   Result Value Ref Range    Color (UA POC) Yellow     Clarity (UA POC) Cloudy     Glucose (UA POC) 2+ Negative    Bilirubin (UA POC)  Negative    Ketones (UA POC) Negative Negative    Specific gravity (UA POC) 1.015 1.001 - 1.035    Blood (UA POC) Trace Negative    pH (UA POC) 5.5 4.6 - 8.0    Protein (UA POC) 1+ Negative mg/dL    Urobilinogen (UA POC) 0.2 mg/dL 0.2 - 1    Nitrites (UA POC) Negative Negative    Leukocyte esterase (UA POC) 2+ Negative     Paola was seen today for urinary frequency.     Diagnoses and all orders for this visit:    Acute cystitis with hematuria  -     amoxicillin-clavulanate (AUGMENTIN) 875-125 mg per tablet; Take 1 Tab by mouth two (2) times a day for 7 days. Urinary frequency  -     AMB POC URINALYSIS DIP STICK AUTO W/O MICRO  -     CULTURE, URINE; Future    Essential hypertension    Reviewed risks and benefits and common side effects of new medication  General comfort measures  Patient verbalizes understanding. I have discussed the diagnosis with the patient and the intended plan as seen in the above orders. The patient has received an after-visit summary and questions were answered concerning future plans. I have discussed medication side effects and warnings with the patient as well. Follow-up Disposition:  Return in about 2 months (around 7/22/2017), or if symptoms worsen or fail to improve, for dm type 2.

## 2017-05-22 NOTE — MR AVS SNAPSHOT
Visit Information Date & Time Provider Department Dept. Phone Encounter #  
 5/22/2017  2:20 PM Shae Noriega NP [facility] [phone] [id number] Follow-up Instructions Return in about 2 months (around 7/22/2017), or if symptoms worsen or fail to improve, for dm type 2.  
  
 6/28/2017  1:30 PM  
Any with Jessica Schaeffer MD  
[facility] ([address]) Appt Note: 3 mo fu  
 [address] 
[address] [facility] [address]  
[address] [facility] [address] [facility] [address] Upcoming Health Maintenance Date Due  
 EYE EXAM RETINAL OR DILATED Q1 12/12/2017* Pneumococcal 65+ Low/Medium Risk (2 of 2 - PPSV23) 6/21/2017 HEMOGLOBIN A1C Q6M 7/13/2017 INFLUENZA AGE 9 TO ADULT 8/1/2017 MICROALBUMIN Q1 8/9/2017 GLAUCOMA SCREENING Q2Y 10/27/2017 FOOT EXAM Q1 4/13/2018 LIPID PANEL Q1 4/13/2018 MEDICARE YEARLY EXAM 4/14/2018 BREAST CANCER SCRN MAMMOGRAM 4/19/2018 DTaP/Tdap/Td series (2 - Td) 4/13/2027 *Topic was postponed. The date shown is not the original due date. Allergies as of 5/22/2017  Review Complete On: 5/22/2017 By: Shae Noriega NP Severity Noted Reaction Type Reactions Phenergan [Promethazine] High 09/22/2014   Side Effect Other (comments) Made patient very hyper and extremely agitated Current Immunizations  Reviewed on 4/11/2016 Name Date Influenza Vaccine PF 9/16/2013 Influenza Vaccine Split 10/7/2011 Not reviewed this visit You Were Diagnosed With   
  
 Codes Comments Acute cystitis with hematuria    -  Primary ICD-10-CM: N30.01 
ICD-9-CM: 595.0 Urinary frequency     ICD-10-CM: R35.0 ICD-9-CM: 788.41 Essential hypertension     ICD-10-CM: I10 
ICD-9-CM: 401.9 Vitals BP Pulse Temp Resp Height(growth percentile) Weight(growth percentile) 110/82 (BP 1 Location: Left arm, BP Patient Position: Sitting) 87 97.8 °F (36.6 °C) (Oral) 16 5' 4\" (1.626 m) 169 lb (76.7 kg) SpO2 BMI OB Status Smoking Status 98% 29.01 kg/m2 Postmenopausal Former Smoker BMI and BSA Data Body Mass Index Body Surface Area  
 29.01 kg/m 2 1.86 m 2 Preferred Pharmacy Pharmacy Name Phone [address], [address] [place] [address] [phone] Your Updated Medication List  
  
   
This list is accurate as of: 5/22/17  2:41 PM.  Always use your most recent med list.  
  
  
  
  
 amoxicillin-clavulanate 875-125 mg per tablet Commonly known as:  AUGMENTIN Take 1 Tab by mouth two (2) times a day for 7 days. aspirin delayed-release 81 mg tablet Take 81 mg by mouth daily. BIOTIN PO Take  by mouth. CALTRATE 600 PO Take  by mouth. conjugated estrogens 0.625 mg/gram vaginal cream  
Commonly known as:  PREMARIN Insert 0.5 g into vagina every seven (7) days. escitalopram oxalate 10 mg tablet Commonly known as:  Joselyn Roque TAKE 1 TABLET DAILY  
  
 hydroCHLOROthiazide 25 mg tablet Commonly known as:  HYDRODIURIL  
TAKE 1 TABLET DAILY  
  
 levothyroxine 100 mcg tablet Commonly known as:  SYNTHROID Take 1 Tab by mouth Daily (before breakfast). LORazepam 0.5 mg tablet Commonly known as:  ATIVAN Take 1 Tab by mouth every eight (8) hours as needed for Anxiety. losartan 50 mg tablet Commonly known as:  COZAAR Take 1 Tab by mouth daily. metFORMIN 1,000 mg tablet Commonly known as:  GLUCOPHAGE Take 1,000 mg by mouth two (2) times daily (with meals). phenazopyridine 100 mg tablet Commonly known as:  PYRIDIUM Take 1 Tab by mouth three (3) times daily as needed for Pain. PRESERVISION AREDS PO Take  by mouth. red yeast rice extract 600 mg Cap Take 600 mg by mouth now. rOPINIRole 1 mg tablet Commonly known as:  Uma Yang  
 TAKE 1 TABLET TWICE A DAY Prescriptions Sent to Pharmacy Refills  
 amoxicillin-clavulanate (AUGMENTIN) 875-125 mg per tablet 0 Sig: Take 1 Tab by mouth two (2) times a day for 7 days. Class: Normal  
 Pharmacy: [id number] [facility], [address] [facility] #: [phone] Route: Oral  
  
We Performed the Following AMB POC URINALYSIS DIP STICK AUTO W/O MICRO [470[address] CPT(R)] Follow-up Instructions Return in about 2 months (around 7/22/2017), or if symptoms worsen or fail to improve, for dm type 2. To-Do List   
 05/22/2017 Microbiology:  CULTURE, URINE Please provide this summary of care documentation to your next provider. Your primary care clinician is listed as [address]. If you have any questions after today's visit, please call [phone].

## 2017-05-22 NOTE — PROGRESS NOTES
1. Have you been to the ER, urgent care clinic since your last visit? Hospitalized since your last visit? No    2. Have you seen or consulted any other health care providers outside of the [address] [place] [address] since your last visit? Include any pap smears or colon screening.  no

## 2017-05-24 LAB
BACTERIA SPEC CULT: NORMAL
SERVICE CMNT-IMP: NORMAL

## 2017-06-20 RX ORDER — HYDROCHLOROTHIAZIDE 25 MG/1
TABLET ORAL
Qty: 90 TAB | Refills: 1 | Status: SHIPPED | OUTPATIENT
Start: 2017-06-20 | End: 2018-01-01 | Stop reason: SDUPTHER

## 2017-07-08 RX ORDER — ESCITALOPRAM OXALATE 10 MG/1
TABLET ORAL
Qty: 90 TAB | Refills: 2 | Status: SHIPPED | OUTPATIENT
Start: 2017-07-08 | End: 2018-01-01 | Stop reason: SDUPTHER

## 2017-07-20 ENCOUNTER — OFFICE VISIT (OUTPATIENT)
Dept: FAMILY MEDICINE CLINIC | Age: 82
End: 2017-07-20

## 2017-07-20 ENCOUNTER — HOSPITAL ENCOUNTER (OUTPATIENT)
Dept: LAB | Age: 82
Discharge: HOME | End: 2017-07-20
Payer: MEDICARE

## 2017-07-20 VITALS
TEMPERATURE: 98.2 F | HEART RATE: 80 BPM | SYSTOLIC BLOOD PRESSURE: 130 MMHG | HEIGHT: 64 IN | WEIGHT: 161.8 LBS | RESPIRATION RATE: 16 BRPM | DIASTOLIC BLOOD PRESSURE: 60 MMHG | BODY MASS INDEX: 27.62 KG/M2

## 2017-07-20 DIAGNOSIS — E11.65 TYPE 2 DIABETES MELLITUS WITH HYPERGLYCEMIA, WITHOUT LONG-TERM CURRENT USE OF INSULIN (HCC): Primary | ICD-10-CM

## 2017-07-20 DIAGNOSIS — R30.0 DYSURIA: ICD-10-CM

## 2017-07-20 DIAGNOSIS — N30.01 ACUTE CYSTITIS WITH HEMATURIA: ICD-10-CM

## 2017-07-20 DIAGNOSIS — E11.65 TYPE 2 DIABETES MELLITUS WITH HYPERGLYCEMIA, WITHOUT LONG-TERM CURRENT USE OF INSULIN (HCC): ICD-10-CM

## 2017-07-20 LAB
BILIRUB UR QL STRIP: NEGATIVE
GLUCOSE UR-MCNC: NEGATIVE MG/DL
HBA1C MFR BLD HPLC: 7.1 %
KETONES P FAST UR STRIP-MCNC: NEGATIVE MG/DL
PH UR STRIP: 5.5 [PH] (ref 4.6–8)
PROT UR QL STRIP: NORMAL MG/DL
SP GR UR STRIP: 1.01 (ref 1–1.03)
UA UROBILINOGEN AMB POC: NORMAL (ref 0.2–1)
URINALYSIS CLARITY POC: NORMAL
URINALYSIS COLOR POC: YELLOW
URINE BLOOD POC: NORMAL
URINE LEUKOCYTES POC: NORMAL
URINE NITRITES POC: NEGATIVE

## 2017-07-20 PROCEDURE — 82043 UR ALBUMIN QUANTITATIVE: CPT | Performed by: NURSE PRACTITIONER

## 2017-07-20 RX ORDER — CIPROFLOXACIN 250 MG/1
250 TABLET, FILM COATED ORAL EVERY 12 HOURS
Qty: 10 TAB | Refills: 0 | Status: SHIPPED | OUTPATIENT
Start: 2017-07-20 | End: 2017-07-25

## 2017-07-20 NOTE — PATIENT INSTRUCTIONS
Ciprofloxacin (By mouth)   Ciprofloxacin (xrm-yvu-VCUY-a-sin)  Treats infections and plague. This medicine is a quinolone antibiotic. Brand Name(s): Cipro   There may be other brand names for this medicine. When This Medicine Should Not Be Used: This medicine is not right for everyone. Do not use it if you had an allergic reaction to ciprofloxacin or to similar medicines. How to Use This Medicine:   Liquid, Tablet, Long Acting Tablet  · Your doctor will tell you how much medicine to use. Do not use more than directed. Take this medicine at the same time each day. · You may take this medicine with or without food. Do not take this medicine with only a source of calcium, such as milk, yogurt, or juice that contains added calcium. You may have foods or drinks that contain calcium as part of a larger meal.  · Swallow the extended-release tablet whole. Do not crush, break, or chew it. · Oral liquid: Shake for at least 15 seconds just before each use. The liquid has small beads floating in it. Do not chew the beads when you drink the liquid. Measure the oral liquid medicine with a marked measuring spoon, oral syringe, or medicine cup. · Tablet: Swallow whole. Do not break, crush, or chew it. · Drink extra fluids so you will urinate more often and help prevent kidney problems. · Take all of the medicine in your prescription to clear up your infection, even if you feel better after the first few doses. · This medicine should come with a Medication Guide. Ask your pharmacist for a copy if you do not have one. · Missed dose: Take a dose as soon as you remember. If it is almost time for your next dose, wait until then and take a regular dose. Do not take extra medicine to make up for a missed dose. · Store the medicine in a closed container at room temperature, away from heat, moisture, and direct light. Throw away any leftover liquid medicine after 14 days.   Drugs and Foods to Avoid:   Ask your doctor or pharmacist before using any other medicine, including over-the-counter medicines, vitamins, and herbal products. · Do not use this medicine together with tizanidine. · Some foods and medicines can affect how ciprofloxacin works. Tell your doctor if you are using any of the following:  ¨ Clozapine, cyclosporine, duloxetine, lidocaine, methotrexate, olanzapine, pentoxifylline, phenytoin, probenecid, ropinirole, sildenafil, theophylline  ¨ Antibiotic (including azithromycin, clarithromycin, erythromycin)  ¨ Blood thinner (including warfarin)  ¨ Diabetes medicine (including glimepiride, glyburide)  ¨ Medicine for depression or mental illness  ¨ Medicine for heart rhythm problems (including amiodarone, procainamide, quinidine, sotalol)  ¨ NSAID pain medicine (including aspirin, celecoxib, diclofenac, ibuprofen, naproxen)  ¨ Steroid medicine (including hydrocortisone, methylprednisolone, prednisone)  · Take ciprofloxacin at least 2 hours before or 6 hours after you take antacids containing aluminum or magnesium, calcium, zinc, iron, lanthanum, sevelamer, sucralfate, and didanosine. This includes vitamin/mineral supplements. · This medicine slows the digestion of caffeine, so it might affect you for longer than normal.  Warnings While Using This Medicine:   · Tell your doctor if you are pregnant or breastfeeding, or if you have kidney disease, liver disease, diabetes, heart disease, myasthenia gravis, or a history of heart rhythm problems (such as prolonged QT interval) or seizures. Tell your doctor if you have ever had tendon or joint problems, including rheumatoid arthritis, or if you have received a transplant. · This medicine may cause the following problems:  ¨ Tendinitis and tendon rupture (may happen after treatment ends)  ¨ Liver damage  ¨ Nerve damage in the arms or legs  ¨ Heart rhythm changes  ¨ Changes in blood sugar levels  · This medicine may make you dizzy, drowsy, or lightheaded.  Do not drive or do anything that could be dangerous until you know how this medicine affects you. · This medicine can cause diarrhea. Call your doctor if the diarrhea becomes severe, does not stop, or is bloody. Do not take any medicine to stop diarrhea until you have talked to your doctor. Diarrhea can occur 2 months or more after you stop taking this medicine. · This medicine may make your skin more sensitive to sunlight. Wear sunscreen. Do not use sunlamps or tanning beds. · Call your doctor if your symptoms do not improve or if they get worse. · Keep all medicine out of the reach of children. Never share your medicine with anyone. Possible Side Effects While Using This Medicine:   Call your doctor right away if you notice any of these side effects:  · Allergic reaction: Itching or hives, swelling in your face or hands, swelling or tingling in your mouth or throat, chest tightness, trouble breathing  · Blistering, peeling, red skin rash  · Dark-colored urine or pale stools, nausea, vomiting, loss of appetite, pain in your upper stomach, yellow skin or eyes  · Diarrhea that may contain blood  · Fainting, dizziness, or lightheadedness  · Fast, slow, or uneven heartbeat  · Numbness, tingling, weakness, or burning pain in your hands, arms, legs, or feet  · Pain, stiffness, swelling, or bruises around your ankle, leg, shoulder, or other joint  · Seizures, severe headache, unusual thoughts or behaviors, trouble sleeping, feeling anxious, confused, or depressed, seeing, hearing, or feeling things that are not there  · Unusual bleeding, bruising, or weakness  If you notice other side effects that you think are caused by this medicine, tell your doctor. Call your doctor for medical advice about side effects. You may report side effects to FDA at 4-321-FDA-6893  © 2017 [address] Information is for End User's use only and may not be sold, redistributed or otherwise used for commercial purposes.   The above information is an  only. It is not intended as medical advice for individual conditions or treatments. Talk to your doctor, nurse or pharmacist before following any medical regimen to see if it is safe and effective for you. Urinary Tract Infection in Women: Care Instructions  Your Care Instructions    A urinary tract infection, or UTI, is a general term for an infection anywhere between the kidneys and the urethra (where urine comes out). Most UTIs are bladder infections. They often cause pain or burning when you urinate. UTIs are caused by bacteria and can be cured with antibiotics. Be sure to complete your treatment so that the infection goes away. Follow-up care is a key part of your treatment and safety. Be sure to make and go to all appointments, and call your doctor if you are having problems. It's also a good idea to know your test results and keep a list of the medicines you take. How can you care for yourself at home? · Take your antibiotics as directed. Do not stop taking them just because you feel better. You need to take the full course of antibiotics. · Drink extra water and other fluids for the next day or two. This may help wash out the bacteria that are causing the infection. (If you have kidney, heart, or liver disease and have to limit fluids, talk with your doctor before you increase your fluid intake.)  · Avoid drinks that are carbonated or have caffeine. They can irritate the bladder. · Urinate often. Try to empty your bladder each time. · To relieve pain, take a hot bath or lay a heating pad set on low over your lower belly or genital area. Never go to sleep with a heating pad in place. To prevent UTIs  · Drink plenty of water each day. This helps you urinate often, which clears bacteria from your system. (If you have kidney, heart, or liver disease and have to limit fluids, talk with your doctor before you increase your fluid intake.)  · Urinate when you need to.   · Urinate right after you have sex. · Change sanitary pads often. · Avoid douches, bubble baths, feminine hygiene sprays, and other feminine hygiene products that have deodorants. · After going to the bathroom, wipe from front to back. When should you call for help? Call your doctor now or seek immediate medical care if:  · Symptoms such as fever, chills, nausea, or vomiting get worse or appear for the first time. · You have new pain in your back just below your rib cage. This is called flank pain. · There is new blood or pus in your urine. · You have any problems with your antibiotic medicine. Watch closely for changes in your health, and be sure to contact your doctor if:  · You are not getting better after taking an antibiotic for 2 days. · Your symptoms go away but then come back. Where can you learn more? Go to http://joey-andrey.info/. Enter L194 in the search box to learn more about \"Urinary Tract Infection in Women: Care Instructions. \"  Current as of: November 28, 2016  Content Version: 11.3  © 2885-1761 [employer]. Care instructions adapted under license by [employer] (which disclaims liability or warranty for this information). If you have questions about a medical condition or this instruction, always ask your healthcare professional. [address] any warranty or liability for your use of this information.

## 2017-07-20 NOTE — MR AVS SNAPSHOT
Visit Information Date & Time Provider Department Dept. Phone Encounter #  
 7/20/2017  2:20 PM Moustapha Qiu  [facility] [phone] [id number] Follow-up Instructions Return in about 4 months (around 11/20/2017), or if symptoms worsen or fail to improve, for follow up DM type 2.  
  
 11/21/2017  1:00 PM  
Any with Therese Otero MD  
[facility] ([facility]) Appt Note: 4 mo fu  
 [address] 
[address] [facility] [address]  
[address] [facility] [address] Upcoming Health Maintenance Date Due MICROALBUMIN Q1 8/9/2017 EYE EXAM RETINAL OR DILATED Q1 12/12/2017* INFLUENZA AGE 9 TO ADULT 8/1/2017 GLAUCOMA SCREENING Q2Y 10/27/2017 HEMOGLOBIN A1C Q6M 1/20/2018 FOOT EXAM Q1 4/13/2018 LIPID PANEL Q1 4/13/2018 MEDICARE YEARLY EXAM 4/14/2018 BREAST CANCER SCRN MAMMOGRAM 4/19/2018 DTaP/Tdap/Td series (2 - Td) 4/13/2027 *Topic was postponed. The date shown is not the original due date. Allergies as of 7/20/2017  Review Complete On: 7/20/2017 By: Moustapha Qiu NP Severity Noted Reaction Type Reactions Phenergan [Promethazine] High 09/22/2014   Side Effect Other (comments) Made patient very hyper and extremely agitated Current Immunizations  Reviewed on 4/11/2016 Name Date Influenza Vaccine PF 9/16/2013 Influenza Vaccine Split 10/7/2011 Not reviewed this visit You Were Diagnosed With   
  
 Codes Comments Type 2 diabetes mellitus with hyperglycemia, without long-term current use of insulin (HCC)    -  Primary ICD-10-CM: E11.65 ICD-9-CM: 250.00, 790.29 Dysuria     ICD-10-CM: R30.0 ICD-9-CM: 241. 1 Acute cystitis with hematuria     ICD-10-CM: N30.01 
ICD-9-CM: 595.0 Vitals BP Pulse Temp Resp Height(growth percentile) Weight(growth percentile) 130/60 (BP 1 Location: Left arm, BP Patient Position: Sitting) 80 98.2 °F (36.8 °C) (Oral) 16 5' 4\" (1.626 m) 161 lb 12.8 oz (73.4 kg) BMI OB Status Smoking Status 27.77 kg/m2 Postmenopausal Former Smoker Vitals History BMI and BSA Data Body Mass Index Body Surface Area  
 27.77 kg/m 2 1.82 m 2 Preferred Pharmacy Pharmacy Name Phone [address], [address] [place] [address] [phone] Your Updated Medication List  
  
   
This list is accurate as of: 7/20/17  2:35 PM.  Always use your most recent med list.  
  
  
  
  
 aspirin delayed-release 81 mg tablet Take 81 mg by mouth daily. BIOTIN PO Take  by mouth. CALTRATE 600 PO Take  by mouth. ciprofloxacin HCl 250 mg tablet Commonly known as:  CIPRO Take 1 Tab by mouth every twelve (12) hours for 5 days. * conjugated estrogens 0.625 mg/gram vaginal cream  
Commonly known as:  PREMARIN Insert 0.5 g into vagina every seven (7) days. * conjugated estrogens 0.625 mg/gram vaginal cream  
Commonly known as:  PREMARIN Insert 1 g into vagina every seven (7) days. At bed time  
  
 escitalopram oxalate 10 mg tablet Commonly known as:  Denisa Yung TAKE 1 TABLET DAILY  
  
 hydroCHLOROthiazide 25 mg tablet Commonly known as:  HYDRODIURIL  
TAKE 1 TABLET DAILY  
  
 levothyroxine 100 mcg tablet Commonly known as:  SYNTHROID Take 1 Tab by mouth Daily (before breakfast). LORazepam 0.5 mg tablet Commonly known as:  ATIVAN Take 1 Tab by mouth every eight (8) hours as needed for Anxiety. losartan 50 mg tablet Commonly known as:  COZAAR Take 1 Tab by mouth daily. metFORMIN 1,000 mg tablet Commonly known as:  GLUCOPHAGE Take 1,000 mg by mouth two (2) times daily (with meals). nitrofurantoin 50 mg capsule Commonly known as:  MACRODANTIN Take 1 Cap by mouth every Monday, Wednesday, Friday.  Indications: BACTERIAL URINARY TRACT INFECTION  
  
 phenazopyridine 100 mg tablet Commonly known as:  PYRIDIUM Take 1 Tab by mouth three (3) times daily as needed for Pain.  
  
 red yeast rice extract 600 mg Cap Take 600 mg by mouth now. rOPINIRole 1 mg tablet Commonly known as:  REQUIP  
TAKE 1 TABLET TWICE A DAY * Notice: This list has 2 medication(s) that are the same as other medications prescribed for you. Read the directions carefully, and ask your doctor or other care provider to review them with you. Prescriptions Sent to Pharmacy Refills  
 ciprofloxacin HCl (CIPRO) 250 mg tablet 0 Sig: Take 1 Tab by mouth every twelve (12) hours for 5 days. Class: Normal  
 Pharmacy: [id number] [place] [address], [facility] [facility] Ph #: [phone] Route: Oral  
  
We Performed the Following AMB POC HEMOGLOBIN A1C [96920 CPT(R)] AMB POC URINALYSIS DIP STICK AUTO W/O MICRO [31443 CPT(R)] Follow-up Instructions Return in about 4 months (around 11/20/2017), or if symptoms worsen or fail to improve, for follow up DM type 2. To-Do List   
 07/20/2017 Lab:  MICROALBUMIN, UR, RAND W/ MICROALBUMIN/CREA RATIO Patient Instructions Ciprofloxacin (By mouth) Ciprofloxacin ([id number]-[id number]-a-[id number]) Treats infections and plague. This medicine is a quinolone antibiotic. Brand Name(s): Cipro There may be other brand names for this medicine. When This Medicine Should Not Be Used: This medicine is not right for everyone. Do not use it if you had an allergic reaction to ciprofloxacin or to similar medicines. How to Use This Medicine:  
Liquid, Tablet, Long Acting Tablet · Your doctor will tell you how much medicine to use. Do not use more than directed. Take this medicine at the same time each day. · You may take this medicine with or without food.  Do not take this medicine with only a source of calcium, such as milk, yogurt, or juice that contains added calcium. You may have foods or drinks that contain calcium as part of a larger meal. 
· Swallow the extended-release tablet whole. Do not crush, break, or chew it. · Oral liquid: Shake for at least 15 seconds just before each use. The liquid has small beads floating in it. Do not chew the beads when you drink the liquid. Measure the oral liquid medicine with a marked measuring spoon, oral syringe, or medicine cup. · Tablet: Swallow whole. Do not break, crush, or chew it. · Drink extra fluids so you will urinate more often and help prevent kidney problems. · Take all of the medicine in your prescription to clear up your infection, even if you feel better after the first few doses. · This medicine should come with a Medication Guide. Ask your pharmacist for a copy if you do not have one. · Missed dose: Take a dose as soon as you remember. If it is almost time for your next dose, wait until then and take a regular dose. Do not take extra medicine to make up for a missed dose. · Store the medicine in a closed container at room temperature, away from heat, moisture, and direct light. Throw away any leftover liquid medicine after 14 days. Drugs and Foods to Avoid: Ask your doctor or pharmacist before using any other medicine, including over-the-counter medicines, vitamins, and herbal products. · Do not use this medicine together with tizanidine. · Some foods and medicines can affect how ciprofloxacin works. Tell your doctor if you are using any of the following: ¨ Clozapine, cyclosporine, duloxetine, lidocaine, methotrexate, olanzapine, pentoxifylline, phenytoin, probenecid, ropinirole, sildenafil, theophylline ¨ Antibiotic (including azithromycin, clarithromycin, erythromycin) ¨ Blood thinner (including warfarin) ¨ Diabetes medicine (including glimepiride, glyburide) ¨ Medicine for depression or mental illness ¨ Medicine for heart rhythm problems (including amiodarone, procainamide, quinidine, sotalol) ¨ NSAID pain medicine (including aspirin, celecoxib, diclofenac, ibuprofen, naproxen) ¨ Steroid medicine (including hydrocortisone, methylprednisolone, prednisone) · Take ciprofloxacin at least 2 hours before or 6 hours after you take antacids containing aluminum or magnesium, calcium, zinc, iron, lanthanum, sevelamer, sucralfate, and didanosine. This includes vitamin/mineral supplements. · This medicine slows the digestion of caffeine, so it might affect you for longer than normal. 
Warnings While Using This Medicine: · Tell your doctor if you are pregnant or breastfeeding, or if you have kidney disease, liver disease, diabetes, heart disease, myasthenia gravis, or a history of heart rhythm problems (such as prolonged QT interval) or seizures. Tell your doctor if you have ever had tendon or joint problems, including rheumatoid arthritis, or if you have received a transplant. · This medicine may cause the following problems: 
¨ Tendinitis and tendon rupture (may happen after treatment ends) ¨ Liver damage ¨ Nerve damage in the arms or legs ¨ Heart rhythm changes ¨ Changes in blood sugar levels · This medicine may make you dizzy, drowsy, or lightheaded. Do not drive or do anything that could be dangerous until you know how this medicine affects you. · This medicine can cause diarrhea. Call your doctor if the diarrhea becomes severe, does not stop, or is bloody. Do not take any medicine to stop diarrhea until you have talked to your doctor. Diarrhea can occur 2 months or more after you stop taking this medicine. · This medicine may make your skin more sensitive to sunlight. Wear sunscreen. Do not use sunlamps or tanning beds. · Call your doctor if your symptoms do not improve or if they get worse. · Keep all medicine out of the reach of children. Never share your medicine with anyone. Possible Side Effects While Using This Medicine: Call your doctor right away if you notice any of these side effects: · Allergic reaction: Itching or hives, swelling in your face or hands, swelling or tingling in your mouth or throat, chest tightness, trouble breathing · Blistering, peeling, red skin rash · Dark-colored urine or pale stools, nausea, vomiting, loss of appetite, pain in your upper stomach, yellow skin or eyes · Diarrhea that may contain blood · Fainting, dizziness, or lightheadedness · Fast, slow, or uneven heartbeat · Numbness, tingling, weakness, or burning pain in your hands, arms, legs, or feet · Pain, stiffness, swelling, or bruises around your ankle, leg, shoulder, or other joint · Seizures, severe headache, unusual thoughts or behaviors, trouble sleeping, feeling anxious, confused, or depressed, seeing, hearing, or feeling things that are not there · Unusual bleeding, bruising, or weakness If you notice other side effects that you think are caused by this medicine, tell your doctor. Call your doctor for medical advice about side effects. You may report side effects to FDA at 8-509-FDA-4847 © 2017 [address]  Information is for End User's use only and may not be sold, redistributed or otherwise used for commercial purposes. The above information is an  only. It is not intended as medical advice for individual conditions or treatments. Talk to your doctor, nurse or pharmacist before following any medical regimen to see if it is safe and effective for you. Urinary Tract Infection in Women: Care Instructions Your Care Instructions A urinary tract infection, or UTI, is a general term for an infection anywhere between the kidneys and the urethra (where urine comes out). Most UTIs are bladder infections. They often cause pain or burning when you urinate. UTIs are caused by bacteria and can be cured with antibiotics. Be sure to complete your treatment so that the infection goes away. Follow-up care is a key part of your treatment and safety. Be sure to make and go to all appointments, and call your doctor if you are having problems. It's also a good idea to know your test results and keep a list of the medicines you take. How can you care for yourself at home? · Take your antibiotics as directed. Do not stop taking them just because you feel better. You need to take the full course of antibiotics. · Drink extra water and other fluids for the next day or two. This may help wash out the bacteria that are causing the infection. (If you have kidney, heart, or liver disease and have to limit fluids, talk with your doctor before you increase your fluid intake.) · Avoid drinks that are carbonated or have caffeine. They can irritate the bladder. · Urinate often. Try to empty your bladder each time. · To relieve pain, take a hot bath or lay a heating pad set on low over your lower belly or genital area. Never go to sleep with a heating pad in place. To prevent UTIs · Drink plenty of water each day. This helps you urinate often, which clears bacteria from your system. (If you have kidney, heart, or liver disease and have to limit fluids, talk with your doctor before you increase your fluid intake.) · Urinate when you need to. · Urinate right after you have sex. · Change sanitary pads often. · Avoid douches, bubble baths, feminine hygiene sprays, and other feminine hygiene products that have deodorants. · After going to the bathroom, wipe from front to back. When should you call for help? Call your doctor now or seek immediate medical care if: · Symptoms such as fever, chills, nausea, or vomiting get worse or appear for the first time. · You have new pain in your back just below your rib cage. This is called flank pain. · There is new blood or pus in your urine. · You have any problems with your antibiotic medicine.  
Watch closely for changes in your health, and be sure to contact your doctor if: 
· You are not getting better after taking an antibiotic for 2 days. · Your symptoms go away but then come back. Where can you learn more? Go to http://joey-andrey.info/. Enter L892 in the search box to learn more about \"Urinary Tract Infection in Women: Care Instructions. \" Current as of: November 28, 2016 Content Version: 11.3 © 5443-5207 [employer]. Care instructions adapted under license by [employer] (which disclaims liability or warranty for this information). If you have questions about a medical condition or this instruction, always ask your healthcare professional. [place] [address] any warranty or liability for your use of this information. Please provide this summary of care documentation to your next provider. Your primary care clinician is listed as [address]. If you have any questions after today's visit, please call [phone].

## 2017-07-21 LAB
CREAT UR-MCNC: 129.4 MG/DL (ref 30–125)
MICROALBUMIN UR-MCNC: 27.3 MG/DL (ref 0–3)
MICROALBUMIN/CREAT UR-RTO: 211 MG/G (ref 0–30)

## 2017-07-21 NOTE — PROGRESS NOTES
1. Have you been to the ER, urgent care clinic since your last visit? Hospitalized since your last visit? No    2. Have you seen or consulted any other health care providers outside of the [address] [place] [address] since your last visit? Include any pap smears or colon screening. No  Subjective:   Glendy Johnson is a 80 y.o. female follow up dm type 2 glycemic control   Current Outpatient Prescriptions   Medication Sig Dispense Refill    nitrofurantoin (MACRODANTIN) 50 mg capsule Take 1 Cap by mouth every Monday, Wednesday, Friday. Indications: BACTERIAL URINARY TRACT INFECTION 30 Cap 3    conjugated estrogens (PREMARIN) 0.625 mg/gram vaginal cream Insert 1 g into vagina every seven (7) days. At bed time 30 g 3    escitalopram oxalate (LEXAPRO) 10 mg tablet TAKE 1 TABLET DAILY 90 Tab 2    hydroCHLOROthiazide (HYDRODIURIL) 25 mg tablet TAKE 1 TABLET DAILY 90 Tab 1    metFORMIN (GLUCOPHAGE) 1,000 mg tablet Take 1,000 mg by mouth two (2) times daily (with meals).  losartan (COZAAR) 50 mg tablet Take 1 Tab by mouth daily. 90 Tab 1    rOPINIRole (REQUIP) 1 mg tablet TAKE 1 TABLET TWICE A  Tab 2    phenazopyridine (PYRIDIUM) 100 mg tablet Take 1 Tab by mouth three (3) times daily as needed for Pain. 12 Tab 0    levothyroxine (SYNTHROID) 100 mcg tablet Take 1 Tab by mouth Daily (before breakfast). 90 Tab 3    conjugated estrogens (PREMARIN) 0.625 mg/gram vaginal cream Insert 0.5 g into vagina every seven (7) days. 45 g 3    BIOTIN PO Take  by mouth.  red yeast rice extract 600 mg cap Take 600 mg by mouth now.  CALCIUM CARBONATE (CALTRATE 600 PO) Take  by mouth.  LORazepam (ATIVAN) 0.5 mg tablet Take 1 Tab by mouth every eight (8) hours as needed for Anxiety. 60 Tab 0    aspirin delayed-release 81 mg tablet Take 81 mg by mouth daily.  ciprofloxacin HCl (CIPRO) 250 mg tablet Take 1 Tab by mouth every twelve (12) hours for 5 days.  10 Tab 0      Hypertension ROS: taking medications as instructed, no medication side effects noted, no TIA's, no chest pain on exertion, no dyspnea on exertion, no swelling of ankles. New concerns: dysuria for several days---currently under care of urology for recurrent UTIs . Objective:   Awake and alert in no acute distress  Lungs clear throughout  S1 S2 RRR without ectopy or murmur auscultated. Abdomen: normoactive bowel sounds all quadrants, +suprapubic tenderness, negative CVAT bilaterally. Integumentary: no rashes  Normal skin turgor  Visit Vitals    /60 (BP 1 Location: Left arm, BP Patient Position: Sitting)    Pulse 80    Temp 98.2 °F (36.8 °C) (Oral)    Resp 16    Ht 5' 4\" (1.626 m)    Wt 161 lb 12.8 oz (73.4 kg)    BMI 27.77 kg/m2      Results for orders placed or performed in visit on 07/20/17   AMB POC HEMOGLOBIN A1C   Result Value Ref Range    Hemoglobin A1c (POC) 7.1 %   AMB POC URINALYSIS DIP STICK AUTO W/O MICRO   Result Value Ref Range    Color (UA POC) Yellow     Clarity (UA POC) Cloudy     Glucose (UA POC) Negative Negative    Bilirubin (UA POC) Negative Negative    Ketones (UA POC) Negative Negative    Specific gravity (UA POC) 1.015 1.001 - 1.035    Blood (UA POC) 2+ Negative    pH (UA POC) 5.5 4.6 - 8.0    Protein (UA POC) 2+ Negative mg/dL    Urobilinogen (UA POC) 0.2 mg/dL 0.2 - 1    Nitrites (UA POC) Negative Negative    Leukocyte esterase (UA POC) 3+ Negative     Paola was seen today for blood sugar problem and dysuria. Diagnoses and all orders for this visit:    Type 2 diabetes mellitus with hyperglycemia, without long-term current use of insulin (HCC)  -     AMB POC HEMOGLOBIN A1C  -     Cancel: MICROALBUMIN, UR, RAND W/ MICROALBUMIN/CREA RATIO; Future    Dysuria  -     AMB POC URINALYSIS DIP STICK AUTO W/O MICRO    Acute cystitis with hematuria  -     ciprofloxacin HCl (CIPRO) 250 mg tablet; Take 1 Tab by mouth every twelve (12) hours for 5 days.       Praised patient for diet and exercise behavior changes and encouraged to continue. Reviewed risks and benefits and common side effects of new medication  Patient verbalizes understanding. I have discussed the diagnosis with the patient and the intended plan as seen in the above orders. The patient has received an after-visit summary and questions were answered concerning future plans. I have discussed medication side effects and warnings with the patient as well. Follow-up Disposition:  Return in about 4 months (around 11/20/2017), or if symptoms worsen or fail to improve, for follow up DM type 2.

## 2017-07-26 ENCOUNTER — TELEPHONE (OUTPATIENT)
Dept: FAMILY MEDICINE CLINIC | Age: 82
End: 2017-07-26

## 2017-07-26 NOTE — TELEPHONE ENCOUNTER
----- Message from Matteo Urban NP sent at 7/24/2017  5:53 PM EDT -----  Does patient have a nephrologist?

## 2017-07-28 NOTE — TELEPHONE ENCOUNTER
Spoke to pt and adv that her Kidney lab results were elevated and she needs to f/u with her Neph. She states she has an appt with him coming up.

## 2017-09-01 NOTE — TELEPHONE ENCOUNTER
Pt is out and a rx to go to [facility] she states it takes to long for the mail order.  She has not been to sleep due to legs please advise [phone]

## 2017-09-22 NOTE — TELEPHONE ENCOUNTER
Please brigid this patient and advise at her earliest convenience that she needs to come by for a lab. Urology has her Septra DS which can cause her to retain potassium and she is on losartan. Losartan is a potassium sparing blood pressure medication.   Want to make sure that her potassium is normal   Marley Guillermo. Dom [place]

## 2017-11-20 NOTE — PATIENT INSTRUCTIONS
Urinary Tract Infection in Women: Care Instructions  Your Care Instructions    A urinary tract infection, or UTI, is a general term for an infection anywhere between the kidneys and the urethra (where urine comes out). Most UTIs are bladder infections. They often cause pain or burning when you urinate. UTIs are caused by bacteria and can be cured with antibiotics. Be sure to complete your treatment so that the infection goes away. Follow-up care is a key part of your treatment and safety. Be sure to make and go to all appointments, and call your doctor if you are having problems. It's also a good idea to know your test results and keep a list of the medicines you take. How can you care for yourself at home? · Take your antibiotics as directed. Do not stop taking them just because you feel better. You need to take the full course of antibiotics. · Drink extra water and other fluids for the next day or two. This may help wash out the bacteria that are causing the infection. (If you have kidney, heart, or liver disease and have to limit fluids, talk with your doctor before you increase your fluid intake.)  · Avoid drinks that are carbonated or have caffeine. They can irritate the bladder. · Urinate often. Try to empty your bladder each time. · To relieve pain, take a hot bath or lay a heating pad set on low over your lower belly or genital area. Never go to sleep with a heating pad in place. To prevent UTIs  · Drink plenty of water each day. This helps you urinate often, which clears bacteria from your system. (If you have kidney, heart, or liver disease and have to limit fluids, talk with your doctor before you increase your fluid intake.)  · Urinate when you need to. · Urinate right after you have sex. · Change sanitary pads often. · Avoid douches, bubble baths, feminine hygiene sprays, and other feminine hygiene products that have deodorants.   · After going to the bathroom, wipe from front to back.  When should you call for help? Call your doctor now or seek immediate medical care if:  ? · Symptoms such as fever, chills, nausea, or vomiting get worse or appear for the first time. ? · You have new pain in your back just below your rib cage. This is called flank pain. ? · There is new blood or pus in your urine. ? · You have any problems with your antibiotic medicine. ? Watch closely for changes in your health, and be sure to contact your doctor if:  ? · You are not getting better after taking an antibiotic for 2 days. ? · Your symptoms go away but then come back. Where can you learn more? Go to http://joey-andrey.info/. Enter S755 in the search box to learn more about \"Urinary Tract Infection in Women: Care Instructions. \"  Current as of: May 12, 2017  Content Version: 11.4  © 4661-8581 [employer]. Care instructions adapted under license by [employer] (which disclaims liability or warranty for this information). If you have questions about a medical condition or this instruction, always ask your healthcare professional. [place] [address] any warranty or liability for your use of this information. Gabapentin (By mouth)   Gabapentin (jessica-a-PEN-tin)  Treats seizures and pain caused by shingles. Brand Name(s): ACTIVE-PAC with Gabapentin, Convenience Landon, Cyclo/Janelle 10/300 Pack, FusePaq Fanatrex, Janelle-V, Gralise, [address] Pack, Neurontin, SmartRx Janelle Kit   There may be other brand names for this medicine. When This Medicine Should Not Be Used: This medicine is not right for everyone. Do not use it if you had an allergic reaction to gabapentin. How to Use This Medicine:   Capsule, Liquid, Tablet  · Take your medicine as directed. Your dose may need to be changed several times to find what works best for you. If you have epilepsy, do not allow more than 12 hours to pass between doses.   · Capsule: Swallow the capsule whole with plenty of water. Do not open, crush, or chew it. · Gralise® tablet: Swallow the tablet whole . Do not crush, break, or chew it. · Neurontin® tablet: If you break a tablet into 2 pieces, use the second half as your next dose. If you don't use it within 28 days, throw it away. · Measure the oral liquid medicine with a marked measuring spoon, oral syringe, or medicine cup. · This medicine should come with a Medication Guide. Ask your pharmacist for a copy if you do not have one. · Missed dose: Take a dose as soon as you remember. If it is almost time for your next dose, wait until then and take a regular dose. Do not take extra medicine to make up for a missed dose. · Store the medicine in a closed container at room temperature, away from heat, moisture, and direct light. Store the Neurontin® oral liquid in the refrigerator. Do not freeze. Drugs and Foods to Avoid:   Ask your doctor or pharmacist before using any other medicine, including over-the-counter medicines, vitamins, and herbal products. · Some medicines can affect how gabapentin works. Tell your doctor if you also use any of the following:   ¨ Hydrocodone  ¨ Morphine  · If you take an antacid, wait at least 2 hours before you take gabapentin. · Tell your doctor if you use anything else that makes you sleepy. Some examples are allergy medicine, narcotic pain medicine, and alcohol. Warnings While Using This Medicine:   · Tell your doctor if you are pregnant or breastfeeding, or if you have kidney problems or are receiving dialysis. Tell your doctor if you have a history of depression or mental health problems. · This medicine may increase depression or thoughts of suicide. Tell your doctor right away if you start to feel more depressed or think about hurting yourself. · This medicine may cause a serious allergic reaction called multiorgan hypersensitivity, which can damage organs and be life-threatening.   · Do not stop using this medicine suddenly. Your doctor will need to slowly decrease your dose before you stop it completely. If you take this medicine to prevent seizures, your seizures may return or occur more often if you stop this medicine suddenly. · This medicine may make you dizzy or drowsy. Do not drive or do anything else that could be dangerous until you know how this medicine affects you. · Tell any doctor or dentist who treats you that you are using this medicine. This medicine may affect certain medical test results. · Your doctor will check your progress and the effects of this medicine at regular visits. Keep all appointments. · Keep all medicine out of the reach of children. Never share your medicine with anyone. Possible Side Effects While Using This Medicine:   Call your doctor right away if you notice any of these side effects:  · Allergic reaction: Itching or hives, swelling in your face or hands, swelling or tingling in your mouth or throat, chest tightness, trouble breathing  · Behavior problems, aggression, restlessness, trouble concentrating, moodiness (especially in children)  · Blistering, peeling, red skin rash  · Change in how much or how often you urinate, bloody or cloudy urine,  · Chest pain, fast heartbeat, trouble breathing  · Dark urine or pale stools, nausea, vomiting, loss of appetite, stomach pain, yellow skin or eyes  · Fever, rash, swollen or tender glands in the neck, armpit, or groin  · Problems with coordination, shakiness, unsteadiness  · Rapid weight gain, swelling in your hands, ankles, or feet  · Unusual moods or behaviors, thoughts of hurting yourself, feeling depressed  If you notice these less serious side effects, talk with your doctor:   · Dizziness, drowsiness, sleepiness, tiredness  If you notice other side effects that you think are caused by this medicine, tell your doctor. Call your doctor for medical advice about side effects.  You may report side effects to FDA at 1-959-RLR-5686  © 2017 [facility] [facility] Information is for End User's use only and may not be sold, redistributed or otherwise used for commercial purposes. The above information is an  only. It is not intended as medical advice for individual conditions or treatments. Talk to your doctor, nurse or pharmacist before following any medical regimen to see if it is safe and effective for you.

## 2017-11-20 NOTE — PROGRESS NOTES
Patient here today for a 4 month follow up for Diabetes. Patient also states she believes she has a bladder infection. 1. Have you been to the ER, urgent care clinic since your last visit? Hospitalized since your last visit? No    2. Have you seen or consulted any other health care providers outside of the [address] [place] [address] since your last visit? Include any pap smears or colon screening.  No.

## 2017-11-20 NOTE — MR AVS SNAPSHOT
Visit Information Date & Time Provider Department Dept. Phone Encounter #  
 11/20/2017 11:00 AM Jona Sheppard NP Jerald Santana [facility] [phone] [id number] Follow-up Instructions Return in about 4 weeks (around 12/18/2017) for RLS. Upcoming Health Maintenance Date Due  
 EYE EXAM RETINAL OR DILATED Q1 12/12/2017* Influenza Age 5 to Adult 11/20/2018* HEMOGLOBIN A1C Q6M 1/20/2018 FOOT EXAM Q1 4/13/2018 LIPID PANEL Q1 4/13/2018 MEDICARE YEARLY EXAM 4/14/2018 BREAST CANCER SCRN MAMMOGRAM 4/19/2018 MICROALBUMIN Q1 7/20/2018 GLAUCOMA SCREENING Q2Y 11/20/2019 DTaP/Tdap/Td series (2 - Td) 4/13/2027 *Topic was postponed. The date shown is not the original due date. Allergies as of 11/20/2017  Review Complete On: 11/20/2017 By: Jona Sheppard NP Severity Noted Reaction Type Reactions Phenergan [Promethazine] High 09/22/2014   Side Effect Other (comments) Made patient very hyper and extremely agitated Current Immunizations  Reviewed on 4/11/2016 Name Date Influenza Vaccine PF 9/16/2013 Influenza Vaccine Split 10/7/2011 Not reviewed this visit You Were Diagnosed With   
  
 Codes Comments Acute cystitis without hematuria    -  Primary ICD-10-CM: N30.00 ICD-9-CM: 595.0   
 RLS (restless legs syndrome)     ICD-10-CM: G25.81 ICD-9-CM: 333.94 Adverse effect of drug, initial encounter     ICD-10-CM: T88. 7XXA ICD-9-CM: E947.9 Vitals BP Pulse Temp Resp Height(growth percentile) Weight(growth percentile)  
 138/64 (BP 1 Location: Right arm, BP Patient Position: Sitting) 81 98.7 °F (37.1 °C) (Oral) 17 5' 4\" (1.626 m) 161 lb (73 kg) SpO2 BMI OB Status Smoking Status 96% 27.64 kg/m2 Postmenopausal Former Smoker Vitals History BMI and BSA Data Body Mass Index Body Surface Area  
 27.64 kg/m 2 1.82 m 2 Preferred Pharmacy Pharmacy Name Phone [address] [place] [address] [phone] Your Updated Medication List  
  
   
This list is accurate as of: 11/20/17 11:46 AM.  Always use your most recent med list.  
  
  
  
  
 aspirin delayed-release 81 mg tablet Take 81 mg by mouth daily. BIOTIN PO Take  by mouth. CALTRATE 600 PO Take  by mouth. * conjugated estrogens 0.625 mg/gram vaginal cream  
Commonly known as:  PREMARIN Insert 0.5 g into vagina every seven (7) days. * conjugated estrogens 0.625 mg/gram vaginal cream  
Commonly known as:  PREMARIN Insert 1 g into vagina every seven (7) days. At bed time  
  
 escitalopram oxalate 10 mg tablet Commonly known as:  [place]ates TAKE 1 TABLET DAILY  
  
 gabapentin 300 mg capsule Commonly known as:  NEURONTIN May take one pill by mouth two hours prior to sleep, may increase to 2 pills by mouth two hours prior to sleep in one week for unresolved symptoms  Indications: Restless Legs Syndrome  
  
 hydroCHLOROthiazide 25 mg tablet Commonly known as:  HYDRODIURIL  
TAKE 1 TABLET DAILY  
  
 levothyroxine 100 mcg tablet Commonly known as:  SYNTHROID Take 1 Tab by mouth Daily (before breakfast). LORazepam 0.5 mg tablet Commonly known as:  ATIVAN Take 1 Tab by mouth every eight (8) hours as needed for Anxiety. losartan 50 mg tablet Commonly known as:  COZAAR  
TAKE 1 TABLET DAILY  
  
 metFORMIN 1,000 mg tablet Commonly known as:  GLUCOPHAGE Take 1,000 mg by mouth two (2) times daily (with meals). nitrofurantoin (macrocrystal-monohydrate) 100 mg capsule Commonly known as:  MACROBID Take 1 Cap by mouth two (2) times a day. phenazopyridine 100 mg tablet Commonly known as:  PYRIDIUM Take 1 Tab by mouth three (3) times daily as needed for Pain.  
  
 red yeast rice extract 600 mg Cap Take 600 mg by mouth now. * Notice:   This list has 2 medication(s) that are the same as other medications prescribed for you. Read the directions carefully, and ask your doctor or other care provider to review them with you. Prescriptions Printed Refills  
 gabapentin (NEURONTIN) 300 mg capsule 0 Sig: May take one pill by mouth two hours prior to sleep, may increase to 2 pills by mouth two hours prior to sleep in one week for unresolved symptoms  Indications: Restless Legs Syndrome Class: Print Prescriptions Sent to Pharmacy Refills  
 nitrofurantoin, macrocrystal-monohydrate, (MACROBID) 100 mg capsule 0 Sig: Take 1 Cap by mouth two (2) times a day. Class: Normal  
 Pharmacy: [id number] [facility], [address] [facility] Ph #: [phone] Route: Oral  
  
Follow-up Instructions Return in about 4 weeks (around 12/18/2017) for RLS. To-Do List   
 11/20/2017 Microbiology:  CULTURE, URINE Patient Instructions Urinary Tract Infection in Women: Care Instructions Your Care Instructions A urinary tract infection, or UTI, is a general term for an infection anywhere between the kidneys and the urethra (where urine comes out). Most UTIs are bladder infections. They often cause pain or burning when you urinate. UTIs are caused by bacteria and can be cured with antibiotics. Be sure to complete your treatment so that the infection goes away. Follow-up care is a key part of your treatment and safety. Be sure to make and go to all appointments, and call your doctor if you are having problems. It's also a good idea to know your test results and keep a list of the medicines you take. How can you care for yourself at home? · Take your antibiotics as directed. Do not stop taking them just because you feel better. You need to take the full course of antibiotics. · Drink extra water and other fluids for the next day or two. This may help wash out the bacteria that are causing the infection.  (If you have kidney, heart, or liver disease and have to limit fluids, talk with your doctor before you increase your fluid intake.) · Avoid drinks that are carbonated or have caffeine. They can irritate the bladder. · Urinate often. Try to empty your bladder each time. · To relieve pain, take a hot bath or lay a heating pad set on low over your lower belly or genital area. Never go to sleep with a heating pad in place. To prevent UTIs · Drink plenty of water each day. This helps you urinate often, which clears bacteria from your system. (If you have kidney, heart, or liver disease and have to limit fluids, talk with your doctor before you increase your fluid intake.) · Urinate when you need to. · Urinate right after you have sex. · Change sanitary pads often. · Avoid douches, bubble baths, feminine hygiene sprays, and other feminine hygiene products that have deodorants. · After going to the bathroom, wipe from front to back. When should you call for help? Call your doctor now or seek immediate medical care if: 
? · Symptoms such as fever, chills, nausea, or vomiting get worse or appear for the first time. ? · You have new pain in your back just below your rib cage. This is called flank pain. ? · There is new blood or pus in your urine. ? · You have any problems with your antibiotic medicine. ? Watch closely for changes in your health, and be sure to contact your doctor if: 
? · You are not getting better after taking an antibiotic for 2 days. ? · Your symptoms go away but then come back. Where can you learn more? Go to http://joey-andrey.info/. Enter F982 in the search box to learn more about \"Urinary Tract Infection in Women: Care Instructions. \" Current as of: May 12, 2017 Content Version: 11.4 © 5820-3374 [employer].  Care instructions adapted under license by [email or web address] (which disclaims liability or warranty for this information). If you have questions about a medical condition or this instruction, always ask your healthcare professional. [address] any warranty or liability for your use of this information. Gabapentin (By mouth) Gabapentin (jessica-a-PEN-tin) Treats seizures and pain caused by shingles. Brand Name(s): ACTIVE-PAC with Gabapentin, Convenience Landon, Cyclo/Janelle 10/300 Pack, [employer], Janelle-V, Gralise, Gralise Starter Pack, Neurontin, [employer] Kit There may be other brand names for this medicine. When This Medicine Should Not Be Used: This medicine is not right for everyone. Do not use it if you had an allergic reaction to gabapentin. How to Use This Medicine:  
Capsule, Liquid, Tablet · Take your medicine as directed. Your dose may need to be changed several times to find what works best for you. If you have epilepsy, do not allow more than 12 hours to pass between doses. · Capsule: Swallow the capsule whole with plenty of water. Do not open, crush, or chew it. · Gralise® tablet: Swallow the tablet whole . Do not crush, break, or chew it. · Neurontin® tablet: If you break a tablet into 2 pieces, use the second half as your next dose. If you don't use it within 28 days, throw it away. · Measure the oral liquid medicine with a marked measuring spoon, oral syringe, or medicine cup. · This medicine should come with a Medication Guide. Ask your pharmacist for a copy if you do not have one. · Missed dose: Take a dose as soon as you remember. If it is almost time for your next dose, wait until then and take a regular dose. Do not take extra medicine to make up for a missed dose. · Store the medicine in a closed container at room temperature, away from heat, moisture, and direct light. Store the Neurontin® oral liquid in the refrigerator. Do not freeze. Drugs and Foods to Avoid: Ask your doctor or pharmacist before using any other medicine, including over-the-counter medicines, vitamins, and herbal products. · Some medicines can affect how gabapentin works. Tell your doctor if you also use any of the following: ¨ Hydrocodone ¨ Morphine · If you take an antacid, wait at least 2 hours before you take gabapentin. · Tell your doctor if you use anything else that makes you sleepy. Some examples are allergy medicine, narcotic pain medicine, and alcohol. Warnings While Using This Medicine: · Tell your doctor if you are pregnant or breastfeeding, or if you have kidney problems or are receiving dialysis. Tell your doctor if you have a history of depression or mental health problems. · This medicine may increase depression or thoughts of suicide. Tell your doctor right away if you start to feel more depressed or think about hurting yourself. · This medicine may cause a serious allergic reaction called multiorgan hypersensitivity, which can damage organs and be life-threatening. · Do not stop using this medicine suddenly. Your doctor will need to slowly decrease your dose before you stop it completely. If you take this medicine to prevent seizures, your seizures may return or occur more often if you stop this medicine suddenly. · This medicine may make you dizzy or drowsy. Do not drive or do anything else that could be dangerous until you know how this medicine affects you. · Tell any doctor or dentist who treats you that you are using this medicine. This medicine may affect certain medical test results. · Your doctor will check your progress and the effects of this medicine at regular visits. Keep all appointments. · Keep all medicine out of the reach of children. Never share your medicine with anyone. Possible Side Effects While Using This Medicine:  
Call your doctor right away if you notice any of these side effects: · Allergic reaction: Itching or hives, swelling in your face or hands, swelling or tingling in your mouth or throat, chest tightness, trouble breathing · Behavior problems, aggression, restlessness, trouble concentrating, moodiness (especially in children) · Blistering, peeling, red skin rash · Change in how much or how often you urinate, bloody or cloudy urine, 
· Chest pain, fast heartbeat, trouble breathing · Dark urine or pale stools, nausea, vomiting, loss of appetite, stomach pain, yellow skin or eyes · Fever, rash, swollen or tender glands in the neck, armpit, or groin · Problems with coordination, shakiness, unsteadiness · Rapid weight gain, swelling in your hands, ankles, or feet · Unusual moods or behaviors, thoughts of hurting yourself, feeling depressed If you notice these less serious side effects, talk with your doctor: · Dizziness, drowsiness, sleepiness, tiredness If you notice other side effects that you think are caused by this medicine, tell your doctor. Call your doctor for medical advice about side effects. You may report side effects to FDA at 7-922-PTB-6200 © 2017 [facility] Information is for End User's use only and may not be sold, redistributed or otherwise used for commercial purposes. The above information is an  only. It is not intended as medical advice for individual conditions or treatments. Talk to your doctor, nurse or pharmacist before following any medical regimen to see if it is safe and effective for you. Please provide this summary of care documentation to your next provider. Your primary care clinician is listed as [address]. If you have any questions after today's visit, please call [phone].

## 2017-11-20 NOTE — PROGRESS NOTES
Subjective:   Edel Galaviz is a 80 y.o. female for urinary frequency and dysuria for over one week. She reports that she switched urologist and is now seeing Dr. Freda Aragon. She discusses that she has been taking a daily medication up until one month ago(cannot recall the name of medication) and had no issues with recurrent UTIs. She has her CT urogram which shows no evidence of suspicious renal or urothelial lesion. Mild generalized enlargement of the intrarenal collecting systems and ureters without evidence of obstructive uropathy. Mild bladder wall thickening with adjacent fat stranding is suggestive of cystitis. CT showed a RLL pulmonary nodule (6X5mm) that appears to have a draining vessel suggesting AVM. Few additional tiny (less than 3 mm) bilateral pulmonary nodules. ROS: denies fever, chills, +cloudy urine, denies malodor to urine. Patient also is concerned regarding possibility of Requip causing UTIs reviewed side effects and 5% of patient's can develop UTIs while taking this medication. She has tried to stop the medication but her RLS and tingling and burning in lower feet is too severe to stop. Discussed other options including gabapentin Patient verbalizes understanding. 7/20/2017  1:51 PM - Ivanna Null, LPN   Component Results   Component Value Flag Ref Range Units Status   Hemoglobin A1c (POC) 7.1   % Final          PMH: reviewed medications and allergy lists and medical and family history. OBJECTIVE:  Awake and alert in no acute distress  Lungs clear throughout  S1 S2 RRR without ectopy or murmur auscultated. Abdomen: normoactive bowel sounds all quadrants, +suprapubic tenderness, negative CVAT bilaterally.   Visit Vitals    /64 (BP 1 Location: Right arm, BP Patient Position: Sitting)    Pulse 81    Temp 98.7 °F (37.1 °C) (Oral)    Resp 17    Ht 5' 4\" (1.626 m)    Wt 161 lb (73 kg)    SpO2 96%    BMI 27.64 kg/m2     Results for orders placed or performed in visit on 11/20/17   AMB POC URINALYSIS DIP STICK AUTO W/O MICRO   Result Value Ref Range    Color (UA POC) Yellow     Clarity (UA POC) Clear     Glucose (UA POC) Trace Negative    Bilirubin (UA POC) Negative Negative    Ketones (UA POC) Negative Negative    Specific gravity (UA POC) 1.010 1.001 - 1.035    Blood (UA POC) 1+ Negative    pH (UA POC) 5.5 4.6 - 8.0    Protein (UA POC) Trace Negative    Urobilinogen (UA POC) 0.2 mg/dL 0.2 - 1    Nitrites (UA POC) Positive Negative    Leukocyte esterase (UA POC) 3+ Negative         Diagnoses and all orders for this visit:    1. Acute cystitis without hematuria  -     CULTURE, URINE; Future  -     nitrofurantoin, macrocrystal-monohydrate, (MACROBID) 100 mg capsule; Take 1 Cap by mouth two (2) times a day. -     AMB POC URINALYSIS DIP STICK AUTO W/O MICRO    2. RLS (restless legs syndrome)  -     gabapentin (NEURONTIN) 300 mg capsule; May take one pill by mouth two hours prior to sleep, may increase to 2 pills by mouth two hours prior to sleep in one week for unresolved symptoms  Indications: Restless Legs Syndrome    3. Adverse effect of drug, initial encounter    4. Pulmonary nodule, right    Stop Requip  General comfort measures  Will monitor pulmonary nodule per Fleischner Society Pulmonary Nodule guidelines patient is low risk for lung cancer and no routine follow up. Patient verbalizes understanding. Reviewed risks and benefits and common side effects of new medication  Will alert patient of urine culture results   Patient verbalizes understanding. I have discussed the diagnosis with the patient and the intended plan as seen in the above orders. The patient has received an after-visit summary and questions were answered concerning future plans. I have discussed medication side effects and warnings with the patient as well. Follow-up Disposition:  Return in about 4 weeks (around 12/18/2017) for RLS.

## 2017-12-18 NOTE — PATIENT INSTRUCTIONS
Restless Legs Syndrome: Care Instructions  Your Care Instructions  Restless legs syndrome is a common nervous system problem. People with this syndrome feel a creeping, achy, or unpleasant feeling in the legs and an overpowering urge to move them. It often occurs in the evening and at night and can lead to sleep problems and tiredness. Your doctor may suggest doing a study of your sleep patterns to figure out what is happening when you try to sleep. Many people get relief from symptoms when they get regular exercise, eat well, and avoid caffeine, alcohol, and tobacco.  Follow-up care is a key part of your treatment and safety. Be sure to make and go to all appointments, and call your doctor if you are having problems. It's also a good idea to know your test results and keep a list of the medicines you take. How can you care for yourself at home? · Take your medicines exactly as prescribed. Call your doctor if you think you are having a problem with your medicine. · Try bathing in hot or cold water. Applying a heating pad or ice bag to your legs may also help symptoms. · Stretch and massage your legs before bed or when discomfort begins. · Get some exercise for at least 30 minutes a day on most days of the week. Stop exercising at least 3 hours before bedtime. · Try to plan for situations where you will need to remain seated for long stretches. For example, if you are traveling by car, plan some stops so you can get out and walk around. · Tell your doctor about any medicines you are taking. This includes all over-the-counter, prescription, and herbal medicines. Some medicines, such as antidepressants, antihistamines, and cold and sinus medicines, can make your symptoms worse. · Avoid caffeine products, such as coffee, tea, cola, and chocolate. Caffeine can interrupt your sleep and stimulate you. · Do not smoke. Nicotine can make restless legs worse.  If you need help quitting, talk to your doctor about stop-smoking programs and medicines. These can increase your chances of quitting for good. · Do not drink alcohol late in the evening. Take steps to help you sleep better  · Get plenty of sunlight in the outdoors, particularly later in the afternoon. · Use the evening hours for settling down. Avoid activities that challenge you in the hours before bedtime. · Eat meals at regular times, and do not snack before bedtime. · Keep your bedroom quiet, dark, and cool. Try using a sleep mask and earplugs to help you sleep. · Limit how much you drink at night to reduce your need to get up to urinate. But do not go to bed thirsty. · Run a fan or other steady \"white noise\" during the night if noises wake you up. · [place] the bed for sleeping and sex. Do your reading or TV watching in another room. · Once you are in bed, relax from head to toe, and guide your mind to pleasant thoughts. · Do not stay in bed longer than 8 hours, and try to avoid naps. · If your symptoms usually improve around 4 a.m. to 6 a.m., try going to bed later than usual or allowing extra time for sleeping in to help you get the rest you need. When should you call for help? Watch closely for changes in your health, and be sure to contact your doctor if:  ? · You are still not getting enough sleep. ? · Your symptoms become more severe or happen more often. Where can you learn more? Go to http://joye-andrey.info/. Enter G703 in the search box to learn more about \"Restless Legs Syndrome: Care Instructions. \"  Current as of: October 14, 2016  Content Version: 11.4  © 8071-5995 [employer] (formerly [employer]). Care instructions adapted under license by [employer] (which disclaims liability or warranty for this information).  If you have questions about a medical condition or this instruction, always ask your healthcare professional. [address] any warranty or liability for your use of this information.

## 2017-12-18 NOTE — PROGRESS NOTES
Patient here today for a follow up for restless leg syndrome. 1. Have you been to the ER, urgent care clinic since your last visit? Hospitalized since your last visit? No    2. Have you seen or consulted any other health care providers outside of the [address] [place] [address] since your last visit? Include any pap smears or colon screening.  No.

## 2017-12-18 NOTE — MR AVS SNAPSHOT
Visit Information Date & Time Provider Department Dept. Phone Encounter #  
 12/18/2017 11:40 AM Rosy Mccoy NP [facility] [phone] [id number] Follow-up Instructions Return in about 3 months (around 3/18/2018) for follow up dm type 2. Upcoming Health Maintenance Date Due  
 EYE EXAM RETINAL OR DILATED Q1 10/27/2016 Influenza Age 5 to Adult 11/20/2018* HEMOGLOBIN A1C Q6M 1/20/2018 FOOT EXAM Q1 4/13/2018 LIPID PANEL Q1 4/13/2018 MEDICARE YEARLY EXAM 4/14/2018 BREAST CANCER SCRN MAMMOGRAM 4/19/2018 MICROALBUMIN Q1 7/20/2018 GLAUCOMA SCREENING Q2Y 11/20/2019 DTaP/Tdap/Td series (2 - Td) 4/13/2027 *Topic was postponed. The date shown is not the original due date. Allergies as of 12/18/2017  Review Complete On: 12/18/2017 By: Rose Gary LPN Severity Noted Reaction Type Reactions Phenergan [Promethazine] High 09/22/2014   Side Effect Other (comments) Made patient very hyper and extremely agitated Current Immunizations  Reviewed on 4/11/2016 Name Date Influenza Vaccine PF 9/16/2013 Influenza Vaccine Split 10/7/2011 Not reviewed this visit You Were Diagnosed With   
  
 Codes Comments RLS (restless legs syndrome)    -  Primary ICD-10-CM: G25.81 ICD-9-CM: 333.94 Type 2 diabetes mellitus without complication, without long-term current use of insulin (HCC)     ICD-10-CM: E11.9 ICD-9-CM: 250.00 Vitals BP Pulse Temp Resp Height(growth percentile) Weight(growth percentile) 130/78 (BP 1 Location: Right arm, BP Patient Position: Sitting) 80 97.6 °F (36.4 °C) 18 5' 4\" (1.626 m) 161 lb (73 kg) SpO2 BMI OB Status Smoking Status 99% 27.64 kg/m2 Postmenopausal Former Smoker Vitals History BMI and BSA Data Body Mass Index Body Surface Area  
 27.64 kg/m 2 1.82 m 2 [phone] Your Updated Medication List  
  
   
This list is accurate as of: 12/18/17 11:54 AM.  Always use your most recent med list.  
  
  
  
  
 aspirin delayed-release 81 mg tablet Take 81 mg by mouth daily. BIOTIN PO Take  by mouth. CALTRATE 600 PO Take  by mouth. * conjugated estrogens 0.625 mg/gram vaginal cream  
Commonly known as:  PREMARIN Insert 0.5 g into vagina every seven (7) days. * conjugated estrogens 0.625 mg/gram vaginal cream  
Commonly known as:  PREMARIN Insert 1 g into vagina every seven (7) days. At bed time  
  
 escitalopram oxalate 10 mg tablet Commonly known as:  Cherre Jews TAKE 1 TABLET DAILY  
  
 gabapentin 300 mg capsule Commonly known as:  NEURONTIN Take 3 Caps by mouth nightly. Indications: Restless Legs Syndrome  
  
 hydroCHLOROthiazide 25 mg tablet Commonly known as:  HYDRODIURIL  
TAKE 1 TABLET DAILY  
  
 levothyroxine 100 mcg tablet Commonly known as:  SYNTHROID  
TAKE 1 TABLET DAILY BEFORE BREAKFAST LORazepam 0.5 mg tablet Commonly known as:  ATIVAN Take 1 Tab by mouth every eight (8) hours as needed for Anxiety. losartan 50 mg tablet Commonly known as:  COZAAR  
TAKE 1 TABLET DAILY  
  
 metFORMIN 1,000 mg tablet Commonly known as:  GLUCOPHAGE Take 1,000 mg by mouth two (2) times daily (with meals). nitrofurantoin (macrocrystal-monohydrate) 100 mg capsule Commonly known as:  MACROBID Take 1 Cap by mouth two (2) times a day. phenazopyridine 100 mg tablet Commonly known as:  PYRIDIUM Take 1 Tab by mouth three (3) times daily as needed for Pain.  
  
 red yeast rice extract 600 mg Cap Take 600 mg by mouth now. trimethoprim 100 mg tablet Commonly known as:  TRIMPEX Take 100 mg by mouth daily. * Notice:   This list has 2 medication(s) that are the same as other medications prescribed for you. Read the directions carefully, and ask your doctor or other care provider to review them with you. Prescriptions Printed Refills  
 gabapentin (NEURONTIN) 300 mg capsule 2 Sig: Take 3 Caps by mouth nightly. Indications: Restless Legs Syndrome Class: Print Route: Oral  
  
Follow-up Instructions Return in about 3 months (around 3/18/2018) for follow up dm type 2. To-Do List   
 12/18/2017 Lab:  HEMOGLOBIN A1C WITH EAG   
  
 12/18/2017 Lab:  LIPID PANEL   
  
 12/18/2017 Lab:  METABOLIC PANEL, COMPREHENSIVE Patient Instructions Restless Legs Syndrome: Care Instructions Your Care Instructions Restless legs syndrome is a common nervous system problem. People with this syndrome feel a creeping, achy, or unpleasant feeling in the legs and an overpowering urge to move them. It often occurs in the evening and at night and can lead to sleep problems and tiredness. Your doctor may suggest doing a study of your sleep patterns to figure out what is happening when you try to sleep. Many people get relief from symptoms when they get regular exercise, eat well, and avoid caffeine, alcohol, and tobacco. 
Follow-up care is a key part of your treatment and safety. Be sure to make and go to all appointments, and call your doctor if you are having problems. It's also a good idea to know your test results and keep a list of the medicines you take. How can you care for yourself at home? · Take your medicines exactly as prescribed. Call your doctor if you think you are having a problem with your medicine. · Try bathing in hot or cold water. Applying a heating pad or ice bag to your legs may also help symptoms. · Stretch and massage your legs before bed or when discomfort begins. · Get some exercise for at least 30 minutes a day on most days of the week. Stop exercising at least 3 hours before bedtime. · Try to plan for situations where you will need to remain seated for long stretches. For example, if you are traveling by car, plan some stops so you can get out and walk around. · Tell your doctor about any medicines you are taking. This includes all over-the-counter, prescription, and herbal medicines. Some medicines, such as antidepressants, antihistamines, and cold and sinus medicines, can make your symptoms worse. · Avoid caffeine products, such as coffee, tea, cola, and chocolate. Caffeine can interrupt your sleep and stimulate you. · Do not smoke. Nicotine can make restless legs worse. If you need help quitting, talk to your doctor about stop-smoking programs and medicines. These can increase your chances of quitting for good. · Do not drink alcohol late in the evening. Take steps to help you sleep better · Get plenty of sunlight in the outdoors, particularly later in the afternoon. · Use the evening hours for settling down. Avoid activities that challenge you in the hours before bedtime. · Eat meals at regular times, and do not snack before bedtime. · Keep your bedroom quiet, dark, and cool. Try using a sleep mask and earplugs to help you sleep. · Limit how much you drink at night to reduce your need to get up to urinate. But do not go to bed thirsty. · Run a fan or other steady \"white noise\" during the night if noises wake you up. · [place] the bed for sleeping and sex. Do your reading or TV watching in another room. · Once you are in bed, relax from head to toe, and guide your mind to pleasant thoughts. · Do not stay in bed longer than 8 hours, and try to avoid naps. · If your symptoms usually improve around 4 a.m. to 6 a.m., try going to bed later than usual or allowing extra time for sleeping in to help you get the rest you need. When should you call for help? Watch closely for changes in your health, and be sure to contact your doctor if: 
? · You are still not getting enough sleep. ? · Your symptoms become more severe or happen more often. Where can you learn more? Go to http://joey-andrey.info/. Enter I854 in the search box to learn more about \"Restless Legs Syndrome: Care Instructions. \" Current as of: October 14, 2016 Content Version: 11.4 © 3030-6779 [employer]. Care instructions adapted under license by [employer] (which disclaims liability or warranty for this information). If you have questions about a medical condition or this instruction, always ask your healthcare professional. [address] any warranty or liability for your use of this information. Introducing [facility]! Dear Philip Galvez: 
Thank you for requesting a [employer] account. Our records indicate that you have previously registered for a [employer] account but its currently inactive. Please call our [employer] support line at [phone]. Additional Information If you have questions, please visit the Frequently Asked Questions section of the [employer] website at [email or web address] Remember, [employer] is NOT to be used for urgent needs. For medical emergencies, dial 911. Now available from your iPhone and Android! Please provide this summary of care documentation to your next provider. Your primary care clinician is listed as Gm Ha. If you have any questions after today's visit, please call [phone].

## 2018-01-01 ENCOUNTER — ANESTHESIA (OUTPATIENT)
Dept: SURGERY | Age: 83
DRG: 559 | End: 2018-01-01
Payer: MEDICARE

## 2018-01-01 ENCOUNTER — OFFICE VISIT (OUTPATIENT)
Dept: FAMILY MEDICINE CLINIC | Age: 83
End: 2018-01-01

## 2018-01-01 ENCOUNTER — PATIENT OUTREACH (OUTPATIENT)
Dept: FAMILY MEDICINE CLINIC | Age: 83
End: 2018-01-01

## 2018-01-01 ENCOUNTER — APPOINTMENT (OUTPATIENT)
Dept: GENERAL RADIOLOGY | Age: 83
DRG: 690 | End: 2018-01-01
Attending: EMERGENCY MEDICINE
Payer: MEDICARE

## 2018-01-01 ENCOUNTER — APPOINTMENT (OUTPATIENT)
Dept: GENERAL RADIOLOGY | Age: 83
End: 2018-01-01
Attending: UROLOGY
Payer: MEDICARE

## 2018-01-01 ENCOUNTER — APPOINTMENT (OUTPATIENT)
Dept: ULTRASOUND IMAGING | Age: 83
DRG: 690 | End: 2018-01-01
Attending: INTERNAL MEDICINE
Payer: MEDICARE

## 2018-01-01 ENCOUNTER — HOSPITAL ENCOUNTER (INPATIENT)
Age: 83
LOS: 7 days | DRG: 559 | End: 2018-08-16
Attending: EMERGENCY MEDICINE | Admitting: SPECIALIST
Payer: MEDICARE

## 2018-01-01 ENCOUNTER — APPOINTMENT (OUTPATIENT)
Dept: GENERAL RADIOLOGY | Age: 83
DRG: 559 | End: 2018-01-01
Attending: PHYSICIAN ASSISTANT
Payer: MEDICARE

## 2018-01-01 ENCOUNTER — HOSPITAL ENCOUNTER (EMERGENCY)
Age: 83
Discharge: HOME | End: 2018-07-05
Attending: EMERGENCY MEDICINE
Payer: MEDICARE

## 2018-01-01 ENCOUNTER — HOSPITAL ENCOUNTER (OUTPATIENT)
Age: 83
Discharge: HOME | End: 2018-02-19
Attending: OPHTHALMOLOGY
Payer: MEDICARE

## 2018-01-01 ENCOUNTER — HOSPITAL ENCOUNTER (EMERGENCY)
Age: 83
Discharge: HOME | End: 2018-04-29
Attending: EMERGENCY MEDICINE
Payer: MEDICARE

## 2018-01-01 ENCOUNTER — HOSPITAL ENCOUNTER (INPATIENT)
Age: 83
LOS: 2 days | Discharge: HOME | DRG: 690 | End: 2018-06-10
Attending: EMERGENCY MEDICINE | Admitting: INTERNAL MEDICINE
Payer: MEDICARE

## 2018-01-01 ENCOUNTER — APPOINTMENT (OUTPATIENT)
Dept: VASCULAR SURGERY | Age: 83
DRG: 559 | End: 2018-01-01
Attending: PHYSICIAN ASSISTANT
Payer: MEDICARE

## 2018-01-01 ENCOUNTER — ANESTHESIA EVENT (OUTPATIENT)
Dept: SURGERY | Age: 83
End: 2018-01-01
Payer: MEDICARE

## 2018-01-01 ENCOUNTER — APPOINTMENT (OUTPATIENT)
Dept: GENERAL RADIOLOGY | Age: 83
DRG: 559 | End: 2018-01-01
Attending: SPECIALIST
Payer: MEDICARE

## 2018-01-01 ENCOUNTER — ANESTHESIA (OUTPATIENT)
Dept: SURGERY | Age: 83
End: 2018-01-01
Payer: MEDICARE

## 2018-01-01 ENCOUNTER — APPOINTMENT (OUTPATIENT)
Dept: NON INVASIVE DIAGNOSTICS | Age: 83
DRG: 559 | End: 2018-01-01
Attending: INTERNAL MEDICINE
Payer: MEDICARE

## 2018-01-01 ENCOUNTER — APPOINTMENT (OUTPATIENT)
Dept: CT IMAGING | Age: 83
End: 2018-01-01
Attending: STUDENT IN AN ORGANIZED HEALTH CARE EDUCATION/TRAINING PROGRAM
Payer: MEDICARE

## 2018-01-01 ENCOUNTER — APPOINTMENT (OUTPATIENT)
Dept: CT IMAGING | Age: 83
DRG: 559 | End: 2018-01-01
Attending: FAMILY MEDICINE
Payer: MEDICARE

## 2018-01-01 ENCOUNTER — TELEPHONE (OUTPATIENT)
Dept: FAMILY MEDICINE CLINIC | Age: 83
End: 2018-01-01

## 2018-01-01 ENCOUNTER — APPOINTMENT (OUTPATIENT)
Dept: CT IMAGING | Age: 83
DRG: 690 | End: 2018-01-01
Attending: EMERGENCY MEDICINE
Payer: MEDICARE

## 2018-01-01 ENCOUNTER — ANESTHESIA EVENT (OUTPATIENT)
Dept: SURGERY | Age: 83
DRG: 559 | End: 2018-01-01
Payer: MEDICARE

## 2018-01-01 ENCOUNTER — HOSPITAL ENCOUNTER (OUTPATIENT)
Age: 83
Setting detail: OUTPATIENT SURGERY
Discharge: HOME | End: 2018-07-23
Attending: UROLOGY | Admitting: UROLOGY
Payer: MEDICARE

## 2018-01-01 ENCOUNTER — APPOINTMENT (OUTPATIENT)
Dept: GENERAL RADIOLOGY | Age: 83
End: 2018-01-01
Attending: EMERGENCY MEDICINE
Payer: MEDICARE

## 2018-01-01 ENCOUNTER — APPOINTMENT (OUTPATIENT)
Dept: GENERAL RADIOLOGY | Age: 83
DRG: 559 | End: 2018-01-01
Attending: EMERGENCY MEDICINE
Payer: MEDICARE

## 2018-01-01 ENCOUNTER — HOSPITAL ENCOUNTER (OUTPATIENT)
Dept: LAB | Age: 83
Discharge: HOME | End: 2018-07-16
Payer: MEDICARE

## 2018-01-01 ENCOUNTER — HOSPITAL ENCOUNTER (OUTPATIENT)
Dept: LAB | Age: 83
Discharge: HOME | End: 2018-05-09
Payer: MEDICARE

## 2018-01-01 VITALS — BODY MASS INDEX: 30.04 KG/M2 | WEIGHT: 175 LBS

## 2018-01-01 VITALS
BODY MASS INDEX: 29.02 KG/M2 | HEART RATE: 78 BPM | TEMPERATURE: 98.1 F | HEIGHT: 64 IN | OXYGEN SATURATION: 95 % | WEIGHT: 170 LBS | SYSTOLIC BLOOD PRESSURE: 122 MMHG | DIASTOLIC BLOOD PRESSURE: 67 MMHG | RESPIRATION RATE: 13 BRPM

## 2018-01-01 VITALS
TEMPERATURE: 97.3 F | HEIGHT: 64 IN | HEART RATE: 67 BPM | DIASTOLIC BLOOD PRESSURE: 62 MMHG | SYSTOLIC BLOOD PRESSURE: 126 MMHG | WEIGHT: 172 LBS | BODY MASS INDEX: 29.37 KG/M2 | RESPIRATION RATE: 20 BRPM | OXYGEN SATURATION: 95 %

## 2018-01-01 VITALS
DIASTOLIC BLOOD PRESSURE: 65 MMHG | BODY MASS INDEX: 28.13 KG/M2 | RESPIRATION RATE: 12 BRPM | TEMPERATURE: 96.7 F | SYSTOLIC BLOOD PRESSURE: 129 MMHG | HEART RATE: 54 BPM | HEIGHT: 64 IN | OXYGEN SATURATION: 95 % | WEIGHT: 164.8 LBS

## 2018-01-01 VITALS
WEIGHT: 161.8 LBS | HEIGHT: 64 IN | DIASTOLIC BLOOD PRESSURE: 87 MMHG | SYSTOLIC BLOOD PRESSURE: 140 MMHG | TEMPERATURE: 98.5 F | BODY MASS INDEX: 27.62 KG/M2 | HEART RATE: 88 BPM | OXYGEN SATURATION: 96 % | RESPIRATION RATE: 16 BRPM

## 2018-01-01 VITALS
BODY MASS INDEX: 27.83 KG/M2 | SYSTOLIC BLOOD PRESSURE: 133 MMHG | HEART RATE: 54 BPM | OXYGEN SATURATION: 95 % | TEMPERATURE: 98.1 F | RESPIRATION RATE: 14 BRPM | WEIGHT: 163 LBS | HEIGHT: 64 IN | DIASTOLIC BLOOD PRESSURE: 81 MMHG

## 2018-01-01 VITALS
SYSTOLIC BLOOD PRESSURE: 153 MMHG | OXYGEN SATURATION: 90 % | HEIGHT: 64 IN | TEMPERATURE: 97 F | DIASTOLIC BLOOD PRESSURE: 89 MMHG | BODY MASS INDEX: 28.72 KG/M2 | WEIGHT: 168.21 LBS | HEART RATE: 96 BPM | RESPIRATION RATE: 20 BRPM

## 2018-01-01 VITALS
SYSTOLIC BLOOD PRESSURE: 129 MMHG | DIASTOLIC BLOOD PRESSURE: 72 MMHG | WEIGHT: 165.8 LBS | TEMPERATURE: 97.9 F | HEIGHT: 64 IN | RESPIRATION RATE: 18 BRPM | BODY MASS INDEX: 28.31 KG/M2 | HEART RATE: 62 BPM | OXYGEN SATURATION: 97 %

## 2018-01-01 VITALS
SYSTOLIC BLOOD PRESSURE: 118 MMHG | RESPIRATION RATE: 18 BRPM | HEIGHT: 64 IN | OXYGEN SATURATION: 98 % | DIASTOLIC BLOOD PRESSURE: 68 MMHG | HEART RATE: 74 BPM | WEIGHT: 169 LBS | TEMPERATURE: 98 F | BODY MASS INDEX: 28.85 KG/M2

## 2018-01-01 VITALS
HEART RATE: 72 BPM | BODY MASS INDEX: 26.46 KG/M2 | TEMPERATURE: 97.7 F | OXYGEN SATURATION: 98 % | SYSTOLIC BLOOD PRESSURE: 128 MMHG | HEIGHT: 64 IN | RESPIRATION RATE: 20 BRPM | WEIGHT: 155 LBS | DIASTOLIC BLOOD PRESSURE: 61 MMHG

## 2018-01-01 DIAGNOSIS — F43.20 BEREAVEMENT REACTION: ICD-10-CM

## 2018-01-01 DIAGNOSIS — R31.0 GROSS HEMATURIA: ICD-10-CM

## 2018-01-01 DIAGNOSIS — S73.004A DISLOCATED HIP, RIGHT, INITIAL ENCOUNTER (HCC): ICD-10-CM

## 2018-01-01 DIAGNOSIS — R82.998 URINE LEUKOCYTES: Primary | ICD-10-CM

## 2018-01-01 DIAGNOSIS — E11.9 TYPE 2 DIABETES MELLITUS WITHOUT COMPLICATION, WITHOUT LONG-TERM CURRENT USE OF INSULIN (HCC): ICD-10-CM

## 2018-01-01 DIAGNOSIS — E87.1 HYPONATREMIA: ICD-10-CM

## 2018-01-01 DIAGNOSIS — F41.8 DEPRESSION WITH ANXIETY: Primary | ICD-10-CM

## 2018-01-01 DIAGNOSIS — R31.9 HEMATURIA, UNSPECIFIED TYPE: Primary | ICD-10-CM

## 2018-01-01 DIAGNOSIS — Z63.4 BEREAVEMENT REACTION: ICD-10-CM

## 2018-01-01 DIAGNOSIS — F43.23 ADJUSTMENT DISORDER WITH MIXED ANXIETY AND DEPRESSED MOOD: ICD-10-CM

## 2018-01-01 DIAGNOSIS — R91.1 PULMONARY NODULE: ICD-10-CM

## 2018-01-01 DIAGNOSIS — H02.431 PARALYTIC PTOSIS OF RIGHT EYELID: ICD-10-CM

## 2018-01-01 DIAGNOSIS — T84.029A: Primary | ICD-10-CM

## 2018-01-01 DIAGNOSIS — R82.998 URINE LEUKOCYTES: ICD-10-CM

## 2018-01-01 DIAGNOSIS — Z87.440 HISTORY OF ACUTE CYSTITIS: ICD-10-CM

## 2018-01-01 DIAGNOSIS — R11.2 NON-INTRACTABLE VOMITING WITH NAUSEA, UNSPECIFIED VOMITING TYPE: ICD-10-CM

## 2018-01-01 DIAGNOSIS — D64.9 ANEMIA, UNSPECIFIED TYPE: ICD-10-CM

## 2018-01-01 DIAGNOSIS — N30.00 ACUTE CYSTITIS WITHOUT HEMATURIA: Primary | ICD-10-CM

## 2018-01-01 DIAGNOSIS — E11.9 CONTROLLED TYPE 2 DIABETES MELLITUS WITHOUT COMPLICATION, WITHOUT LONG-TERM CURRENT USE OF INSULIN (HCC): Primary | ICD-10-CM

## 2018-01-01 DIAGNOSIS — I95.1 ORTHOSTATIC HYPOTENSION: ICD-10-CM

## 2018-01-01 DIAGNOSIS — Z09 HOSPITAL DISCHARGE FOLLOW-UP: Primary | ICD-10-CM

## 2018-01-01 LAB
ALBUMIN SERPL-MCNC: 2.3 G/DL (ref 3.4–5)
ALBUMIN SERPL-MCNC: 2.5 G/DL (ref 3.4–5)
ALBUMIN SERPL-MCNC: 3.6 G/DL (ref 3.4–5)
ALBUMIN SERPL-MCNC: 3.6 G/DL (ref 3.4–5)
ALBUMIN/GLOB SERPL: 0.6 {RATIO} (ref 0.8–1.7)
ALBUMIN/GLOB SERPL: 0.8 {RATIO} (ref 0.8–1.7)
ALBUMIN/GLOB SERPL: 0.8 {RATIO} (ref 0.8–1.7)
ALBUMIN/GLOB SERPL: 0.9 {RATIO} (ref 0.8–1.7)
ALP SERPL-CCNC: 69 U/L (ref 45–117)
ALP SERPL-CCNC: 74 U/L (ref 45–117)
ALP SERPL-CCNC: 85 U/L (ref 45–117)
ALP SERPL-CCNC: 88 U/L (ref 45–117)
ALT SERPL-CCNC: 11 U/L (ref 13–56)
ALT SERPL-CCNC: 139 U/L (ref 13–56)
ALT SERPL-CCNC: 17 U/L (ref 13–56)
ALT SERPL-CCNC: 18 U/L (ref 13–56)
ANION GAP SERPL CALC-SCNC: 11 MMOL/L (ref 3–18)
ANION GAP SERPL CALC-SCNC: 12 MMOL/L (ref 3–18)
ANION GAP SERPL CALC-SCNC: 13 MMOL/L (ref 3–18)
ANION GAP SERPL CALC-SCNC: 13 MMOL/L (ref 3–18)
ANION GAP SERPL CALC-SCNC: 8 MMOL/L (ref 3–18)
ANION GAP SERPL CALC-SCNC: 8 MMOL/L (ref 3–18)
ANION GAP SERPL CALC-SCNC: 9 MMOL/L (ref 3–18)
ANION GAP SERPL CALC-SCNC: 9 MMOL/L (ref 3–18)
APPEARANCE UR: ABNORMAL
APTT PPP: 123.4 SEC (ref 23–36.4)
APTT PPP: 131.7 SEC (ref 23–36.4)
APTT PPP: 27.8 SEC (ref 23–36.4)
APTT PPP: 34.5 SEC (ref 23–36.4)
APTT PPP: 65.5 SEC (ref 23–36.4)
APTT PPP: >180 SEC (ref 23–36.4)
ARTERIAL PATENCY WRIST A: YES
AST SERPL-CCNC: 14 U/L (ref 15–37)
AST SERPL-CCNC: 15 U/L (ref 15–37)
AST SERPL-CCNC: 267 U/L (ref 15–37)
AST SERPL-CCNC: 9 U/L (ref 15–37)
ATRIAL RATE: 182 BPM
ATRIAL RATE: 55 BPM
ATRIAL RATE: 79 BPM
ATRIAL RATE: 79 BPM
BACTERIA SPEC CULT: ABNORMAL
BACTERIA SPEC CULT: ABNORMAL
BACTERIA SPEC CULT: NORMAL
BACTERIA SPEC CULT: NORMAL
BACTERIA URNS QL MICRO: ABNORMAL /HPF
BASE DEFICIT BLD-SCNC: 12 MMOL/L
BASOPHILS # BLD: 0 K/UL (ref 0–0.06)
BASOPHILS # BLD: 0 K/UL (ref 0–0.1)
BASOPHILS NFR BLD: 0 % (ref 0–2)
BASOPHILS NFR BLD: 0 % (ref 0–3)
BASOPHILS NFR BLD: 0 % (ref 0–3)
BDY SITE: ABNORMAL
BILIRUB DIRECT SERPL-MCNC: 0.2 MG/DL (ref 0–0.2)
BILIRUB SERPL-MCNC: 0.3 MG/DL (ref 0.2–1)
BILIRUB SERPL-MCNC: 0.5 MG/DL (ref 0.2–1)
BILIRUB SERPL-MCNC: 0.5 MG/DL (ref 0.2–1)
BILIRUB SERPL-MCNC: 0.7 MG/DL (ref 0.2–1)
BILIRUB UR QL STRIP: NEGATIVE
BILIRUB UR QL: NEGATIVE
BUN SERPL-MCNC: 11 MG/DL (ref 7–18)
BUN SERPL-MCNC: 14 MG/DL (ref 7–18)
BUN SERPL-MCNC: 18 MG/DL (ref 7–18)
BUN SERPL-MCNC: 26 MG/DL (ref 7–18)
BUN SERPL-MCNC: 3 MG/DL (ref 7–18)
BUN SERPL-MCNC: 4 MG/DL (ref 7–18)
BUN SERPL-MCNC: 49 MG/DL (ref 7–18)
BUN SERPL-MCNC: 50 MG/DL (ref 7–18)
BUN SERPL-MCNC: 51 MG/DL (ref 7–18)
BUN SERPL-MCNC: 8 MG/DL (ref 7–18)
BUN/CREAT SERPL: 10 (ref 12–20)
BUN/CREAT SERPL: 11 (ref 12–20)
BUN/CREAT SERPL: 12 (ref 12–20)
BUN/CREAT SERPL: 13 (ref 12–20)
BUN/CREAT SERPL: 18 (ref 12–20)
BUN/CREAT SERPL: 20 (ref 12–20)
BUN/CREAT SERPL: 24 (ref 12–20)
BUN/CREAT SERPL: 4 (ref 12–20)
BUN/CREAT SERPL: 5 (ref 12–20)
BUN/CREAT SERPL: 9 (ref 12–20)
CALCIUM SERPL-MCNC: 7.2 MG/DL (ref 8.5–10.1)
CALCIUM SERPL-MCNC: 7.9 MG/DL (ref 8.5–10.1)
CALCIUM SERPL-MCNC: 7.9 MG/DL (ref 8.5–10.1)
CALCIUM SERPL-MCNC: 8 MG/DL (ref 8.5–10.1)
CALCIUM SERPL-MCNC: 8.3 MG/DL (ref 8.5–10.1)
CALCIUM SERPL-MCNC: 8.4 MG/DL (ref 8.5–10.1)
CALCIUM SERPL-MCNC: 8.6 MG/DL (ref 8.5–10.1)
CALCIUM SERPL-MCNC: 8.6 MG/DL (ref 8.5–10.1)
CALCIUM SERPL-MCNC: 9.4 MG/DL (ref 8.5–10.1)
CALCIUM SERPL-MCNC: 9.5 MG/DL (ref 8.5–10.1)
CALCULATED P AXIS, ECG09: 103 DEGREES
CALCULATED P AXIS, ECG09: 35 DEGREES
CALCULATED P AXIS, ECG09: 90 DEGREES
CALCULATED R AXIS, ECG10: -15 DEGREES
CALCULATED R AXIS, ECG10: -18 DEGREES
CALCULATED R AXIS, ECG10: -30 DEGREES
CALCULATED R AXIS, ECG10: -42 DEGREES
CALCULATED T AXIS, ECG11: -25 DEGREES
CALCULATED T AXIS, ECG11: 20 DEGREES
CALCULATED T AXIS, ECG11: 54 DEGREES
CALCULATED T AXIS, ECG11: 76 DEGREES
CHLORIDE SERPL-SCNC: 101 MMOL/L (ref 100–108)
CHLORIDE SERPL-SCNC: 101 MMOL/L (ref 100–108)
CHLORIDE SERPL-SCNC: 103 MMOL/L (ref 100–108)
CHLORIDE SERPL-SCNC: 104 MMOL/L (ref 100–108)
CHLORIDE SERPL-SCNC: 89 MMOL/L (ref 100–108)
CHLORIDE SERPL-SCNC: 94 MMOL/L (ref 100–108)
CHLORIDE SERPL-SCNC: 95 MMOL/L (ref 100–108)
CHLORIDE SERPL-SCNC: 96 MMOL/L (ref 100–108)
CHLORIDE SERPL-SCNC: 98 MMOL/L (ref 100–108)
CHLORIDE SERPL-SCNC: 98 MMOL/L (ref 100–108)
CK MB CFR SERPL CALC: NORMAL % (ref 0–4)
CK MB SERPL-MCNC: <1 NG/ML (ref 5–25)
CK SERPL-CCNC: 126 U/L (ref 26–192)
CK SERPL-CCNC: 35 U/L (ref 26–192)
CK SERPL-CCNC: 47 U/L (ref 26–192)
CO2 SERPL-SCNC: 21 MMOL/L (ref 21–32)
CO2 SERPL-SCNC: 25 MMOL/L (ref 21–32)
CO2 SERPL-SCNC: 25 MMOL/L (ref 21–32)
CO2 SERPL-SCNC: 26 MMOL/L (ref 21–32)
COLOR UR: ABNORMAL
COLOR UR: ABNORMAL
COLOR UR: YELLOW
CREAT SERPL-MCNC: 0.73 MG/DL (ref 0.6–1.3)
CREAT SERPL-MCNC: 0.75 MG/DL (ref 0.6–1.3)
CREAT SERPL-MCNC: 0.86 MG/DL (ref 0.6–1.3)
CREAT SERPL-MCNC: 1.07 MG/DL (ref 0.6–1.3)
CREAT SERPL-MCNC: 1.14 MG/DL (ref 0.6–1.3)
CREAT SERPL-MCNC: 1.37 MG/DL (ref 0.6–1.3)
CREAT SERPL-MCNC: 2.17 MG/DL (ref 0.6–1.3)
CREAT SERPL-MCNC: 2.41 MG/DL (ref 0.6–1.3)
CREAT SERPL-MCNC: 2.47 MG/DL (ref 0.6–1.3)
CREAT SERPL-MCNC: 2.76 MG/DL (ref 0.6–1.3)
CREAT UR-MCNC: 1 MG/DL (ref 0.6–1.3)
CREAT UR-MCNC: <13 MG/DL (ref 30–125)
DIAGNOSIS, 93000: NORMAL
DIFFERENTIAL METHOD BLD: ABNORMAL
ECHO LV INTERNAL DIMENSION DIASTOLIC: 1.59 CM (ref 3.9–5.3)
ECHO LV INTERNAL DIMENSION SYSTOLIC: 1.96 CM
ECHO LV IVSD: 1.62 CM (ref 0.6–0.9)
ECHO LV MASS 2D: 82.2 G (ref 67–162)
ECHO LV MASS INDEX 2D: 46.8 G/M2
ECHO LV POSTERIOR WALL DIASTOLIC: 1.37 CM (ref 0.6–0.9)
ECHO RV TAPSE: 0.87 CM (ref 1.5–2)
ECHO TV REGURGITANT MAX VELOCITY: 255.85 CM/S
ECHO TV REGURGITANT PEAK GRADIENT: 26.2 MMHG
EOSINOPHIL # BLD: 0 K/UL (ref 0–0.4)
EOSINOPHIL NFR BLD: 0 % (ref 0–5)
EOSINOPHIL NFR BLD: 1 % (ref 0–5)
EPITH CASTS URNS QL MICRO: ABNORMAL /LPF (ref 0–5)
ERYTHROCYTE [DISTWIDTH] IN BLOOD BY AUTOMATED COUNT: 13.7 % (ref 11.6–14.5)
ERYTHROCYTE [DISTWIDTH] IN BLOOD BY AUTOMATED COUNT: 13.8 % (ref 11.6–14.5)
ERYTHROCYTE [DISTWIDTH] IN BLOOD BY AUTOMATED COUNT: 13.8 % (ref 11.6–14.5)
ERYTHROCYTE [DISTWIDTH] IN BLOOD BY AUTOMATED COUNT: 13.9 % (ref 11.6–14.5)
ERYTHROCYTE [DISTWIDTH] IN BLOOD BY AUTOMATED COUNT: 14.2 % (ref 11.6–14.5)
ERYTHROCYTE [DISTWIDTH] IN BLOOD BY AUTOMATED COUNT: 14.4 % (ref 11.6–14.5)
ERYTHROCYTE [DISTWIDTH] IN BLOOD BY AUTOMATED COUNT: 14.5 % (ref 11.6–14.5)
ERYTHROCYTE [DISTWIDTH] IN BLOOD BY AUTOMATED COUNT: 14.5 % (ref 11.6–14.5)
ERYTHROCYTE [DISTWIDTH] IN BLOOD BY AUTOMATED COUNT: 14.8 % (ref 11.6–14.5)
EST. AVERAGE GLUCOSE BLD GHB EST-MCNC: 174 MG/DL
GAS FLOW.O2 O2 DELIVERY SYS: ABNORMAL L/MIN
GAS FLOW.O2 SETTING OXYMISER: 7 L/M
GLOBULIN SER CALC-MCNC: 3.1 G/DL (ref 2–4)
GLOBULIN SER CALC-MCNC: 3.7 G/DL (ref 2–4)
GLOBULIN SER CALC-MCNC: 3.8 G/DL (ref 2–4)
GLOBULIN SER CALC-MCNC: 4.7 G/DL (ref 2–4)
GLUCOSE BLD STRIP.AUTO-MCNC: 111 MG/DL (ref 70–110)
GLUCOSE BLD STRIP.AUTO-MCNC: 111 MG/DL (ref 70–110)
GLUCOSE BLD STRIP.AUTO-MCNC: 112 MG/DL (ref 70–110)
GLUCOSE BLD STRIP.AUTO-MCNC: 113 MG/DL (ref 70–110)
GLUCOSE BLD STRIP.AUTO-MCNC: 115 MG/DL (ref 70–110)
GLUCOSE BLD STRIP.AUTO-MCNC: 118 MG/DL (ref 70–110)
GLUCOSE BLD STRIP.AUTO-MCNC: 118 MG/DL (ref 70–110)
GLUCOSE BLD STRIP.AUTO-MCNC: 121 MG/DL (ref 70–110)
GLUCOSE BLD STRIP.AUTO-MCNC: 123 MG/DL (ref 70–110)
GLUCOSE BLD STRIP.AUTO-MCNC: 123 MG/DL (ref 70–110)
GLUCOSE BLD STRIP.AUTO-MCNC: 128 MG/DL (ref 70–110)
GLUCOSE BLD STRIP.AUTO-MCNC: 131 MG/DL (ref 70–110)
GLUCOSE BLD STRIP.AUTO-MCNC: 133 MG/DL (ref 70–110)
GLUCOSE BLD STRIP.AUTO-MCNC: 136 MG/DL (ref 70–110)
GLUCOSE BLD STRIP.AUTO-MCNC: 162 MG/DL (ref 70–110)
GLUCOSE BLD STRIP.AUTO-MCNC: 165 MG/DL (ref 70–110)
GLUCOSE BLD STRIP.AUTO-MCNC: 165 MG/DL (ref 70–110)
GLUCOSE BLD STRIP.AUTO-MCNC: 180 MG/DL (ref 70–110)
GLUCOSE BLD STRIP.AUTO-MCNC: 200 MG/DL (ref 70–110)
GLUCOSE BLD STRIP.AUTO-MCNC: 201 MG/DL (ref 70–110)
GLUCOSE BLD STRIP.AUTO-MCNC: 220 MG/DL (ref 70–110)
GLUCOSE BLD STRIP.AUTO-MCNC: 224 MG/DL (ref 70–110)
GLUCOSE BLD STRIP.AUTO-MCNC: 276 MG/DL (ref 70–110)
GLUCOSE BLD STRIP.AUTO-MCNC: 313 MG/DL (ref 70–110)
GLUCOSE BLD STRIP.AUTO-MCNC: 326 MG/DL (ref 70–110)
GLUCOSE BLD STRIP.AUTO-MCNC: 68 MG/DL (ref 70–110)
GLUCOSE BLD STRIP.AUTO-MCNC: 74 MG/DL (ref 70–110)
GLUCOSE BLD STRIP.AUTO-MCNC: 75 MG/DL (ref 70–110)
GLUCOSE BLD STRIP.AUTO-MCNC: 88 MG/DL (ref 70–110)
GLUCOSE SERPL-MCNC: 102 MG/DL (ref 74–99)
GLUCOSE SERPL-MCNC: 119 MG/DL (ref 74–99)
GLUCOSE SERPL-MCNC: 139 MG/DL (ref 74–99)
GLUCOSE SERPL-MCNC: 140 MG/DL (ref 74–99)
GLUCOSE SERPL-MCNC: 155 MG/DL (ref 74–99)
GLUCOSE SERPL-MCNC: 179 MG/DL (ref 74–99)
GLUCOSE SERPL-MCNC: 191 MG/DL (ref 74–99)
GLUCOSE SERPL-MCNC: 216 MG/DL (ref 74–99)
GLUCOSE SERPL-MCNC: 297 MG/DL (ref 74–99)
GLUCOSE SERPL-MCNC: 319 MG/DL (ref 74–99)
GLUCOSE UR STRIP.AUTO-MCNC: 250 MG/DL
GLUCOSE UR STRIP.AUTO-MCNC: NEGATIVE MG/DL
GLUCOSE UR-MCNC: NEGATIVE MG/DL
HBA1C MFR BLD HPLC: 9.4 %
HBA1C MFR BLD: 7.7 % (ref 4.2–5.6)
HCO3 BLD-SCNC: 15.2 MMOL/L (ref 22–26)
HCT VFR BLD AUTO: 30.1 % (ref 35–45)
HCT VFR BLD AUTO: 31.9 % (ref 35–45)
HCT VFR BLD AUTO: 32.8 % (ref 35–45)
HCT VFR BLD AUTO: 35.6 % (ref 35–45)
HCT VFR BLD AUTO: 35.8 % (ref 35–45)
HCT VFR BLD AUTO: 37.9 % (ref 35–45)
HCT VFR BLD AUTO: 42.6 % (ref 35–45)
HGB BLD-MCNC: 10.1 G/DL (ref 12–16)
HGB BLD-MCNC: 10.7 G/DL (ref 12–16)
HGB BLD-MCNC: 10.8 G/DL (ref 12–16)
HGB BLD-MCNC: 11.4 G/DL (ref 12–16)
HGB BLD-MCNC: 11.8 G/DL (ref 12–16)
HGB BLD-MCNC: 12.2 G/DL (ref 12–16)
HGB BLD-MCNC: 12.9 G/DL (ref 12–16)
HGB BLD-MCNC: 13.2 G/DL (ref 12–16)
HGB BLD-MCNC: 14.7 G/DL (ref 12–16)
HGB UR QL STRIP: ABNORMAL
INR PPP: 0.9 (ref 0.8–1.2)
INR PPP: 1 (ref 0.8–1.2)
INR PPP: 1 (ref 0.8–1.2)
KETONES P FAST UR STRIP-MCNC: NEGATIVE MG/DL
KETONES UR QL STRIP.AUTO: 40 MG/DL
KETONES UR QL STRIP.AUTO: NEGATIVE MG/DL
LACTATE BLD-SCNC: 0.9 MMOL/L (ref 0.4–2)
LACTATE SERPL-SCNC: 1.4 MMOL/L (ref 0.4–2)
LEUKOCYTE ESTERASE UR QL STRIP.AUTO: ABNORMAL
LEUKOCYTE ESTERASE UR QL STRIP.AUTO: NEGATIVE
LIPASE SERPL-CCNC: 151 U/L (ref 73–393)
LIPASE SERPL-CCNC: 167 U/L (ref 73–393)
LYMPHOCYTES # BLD: 0.3 K/UL (ref 0.8–3.5)
LYMPHOCYTES # BLD: 0.3 K/UL (ref 0.8–3.5)
LYMPHOCYTES # BLD: 0.8 K/UL (ref 0.9–3.6)
LYMPHOCYTES # BLD: 1.1 K/UL (ref 0.9–3.6)
LYMPHOCYTES # BLD: 1.1 K/UL (ref 0.9–3.6)
LYMPHOCYTES # BLD: 1.3 K/UL (ref 0.9–3.6)
LYMPHOCYTES NFR BLD: 14 % (ref 21–52)
LYMPHOCYTES NFR BLD: 2 % (ref 20–51)
LYMPHOCYTES NFR BLD: 2 % (ref 20–51)
LYMPHOCYTES NFR BLD: 20 % (ref 21–52)
LYMPHOCYTES NFR BLD: 23 % (ref 21–52)
LYMPHOCYTES NFR BLD: 24 % (ref 21–52)
LYMPHOCYTES NFR BLD: 24 % (ref 21–52)
LYMPHOCYTES NFR BLD: 8 % (ref 21–52)
MAGNESIUM SERPL-MCNC: 1.6 MG/DL (ref 1.6–2.6)
MAGNESIUM SERPL-MCNC: 1.8 MG/DL (ref 1.6–2.6)
MAGNESIUM SERPL-MCNC: 1.9 MG/DL (ref 1.6–2.6)
MCH RBC QN AUTO: 28.5 PG (ref 24–34)
MCH RBC QN AUTO: 28.6 PG (ref 24–34)
MCH RBC QN AUTO: 28.8 PG (ref 24–34)
MCH RBC QN AUTO: 29.3 PG (ref 24–34)
MCH RBC QN AUTO: 29.3 PG (ref 24–34)
MCH RBC QN AUTO: 29.4 PG (ref 24–34)
MCH RBC QN AUTO: 29.4 PG (ref 24–34)
MCH RBC QN AUTO: 29.5 PG (ref 24–34)
MCH RBC QN AUTO: 29.6 PG (ref 24–34)
MCHC RBC AUTO-ENTMCNC: 33.5 G/DL (ref 31–37)
MCHC RBC AUTO-ENTMCNC: 33.6 G/DL (ref 31–37)
MCHC RBC AUTO-ENTMCNC: 33.9 G/DL (ref 31–37)
MCHC RBC AUTO-ENTMCNC: 34.3 G/DL (ref 31–37)
MCHC RBC AUTO-ENTMCNC: 34.5 G/DL (ref 31–37)
MCHC RBC AUTO-ENTMCNC: 34.8 G/DL (ref 31–37)
MCHC RBC AUTO-ENTMCNC: 35.7 G/DL (ref 31–37)
MCHC RBC AUTO-ENTMCNC: 36 G/DL (ref 31–37)
MCHC RBC AUTO-ENTMCNC: 36 G/DL (ref 31–37)
MCV RBC AUTO: 81.4 FL (ref 74–97)
MCV RBC AUTO: 81.5 FL (ref 74–97)
MCV RBC AUTO: 82.2 FL (ref 74–97)
MCV RBC AUTO: 84.4 FL (ref 74–97)
MCV RBC AUTO: 84.8 FL (ref 74–97)
MCV RBC AUTO: 85 FL (ref 74–97)
MCV RBC AUTO: 85.4 FL (ref 74–97)
MCV RBC AUTO: 85.5 FL (ref 74–97)
MCV RBC AUTO: 86 FL (ref 74–97)
MONOCYTES # BLD: 0.4 K/UL (ref 0.05–1.2)
MONOCYTES # BLD: 0.5 K/UL (ref 0–1)
MONOCYTES # BLD: 0.6 K/UL (ref 0.05–1.2)
MONOCYTES # BLD: 0.7 K/UL (ref 0.05–1.2)
MONOCYTES # BLD: 0.7 K/UL (ref 0.05–1.2)
MONOCYTES # BLD: 0.8 K/UL (ref 0.05–1.2)
MONOCYTES # BLD: 0.9 K/UL (ref 0.05–1.2)
MONOCYTES # BLD: 1.3 K/UL (ref 0–1)
MONOCYTES NFR BLD: 13 % (ref 3–10)
MONOCYTES NFR BLD: 13 % (ref 3–10)
MONOCYTES NFR BLD: 15 % (ref 3–10)
MONOCYTES NFR BLD: 18 % (ref 3–10)
MONOCYTES NFR BLD: 20 % (ref 3–10)
MONOCYTES NFR BLD: 4 % (ref 2–9)
MONOCYTES NFR BLD: 4 % (ref 3–10)
MONOCYTES NFR BLD: 8 % (ref 2–9)
NEUTS BAND NFR BLD MANUAL: 21 % (ref 0–5)
NEUTS BAND NFR BLD MANUAL: 38 % (ref 0–5)
NEUTS SEG # BLD: 14.9 K/UL (ref 1.8–8)
NEUTS SEG # BLD: 2 K/UL (ref 1.8–8)
NEUTS SEG # BLD: 2.5 K/UL (ref 1.8–8)
NEUTS SEG # BLD: 2.9 K/UL (ref 1.8–8)
NEUTS SEG # BLD: 3.9 K/UL (ref 1.8–8)
NEUTS SEG # BLD: 4.2 K/UL (ref 1.8–8)
NEUTS SEG # BLD: 7.6 K/UL (ref 1.8–8)
NEUTS SEG # BLD: 8.5 K/UL (ref 1.8–8)
NEUTS SEG NFR BLD: 56 % (ref 40–73)
NEUTS SEG NFR BLD: 56 % (ref 42–75)
NEUTS SEG NFR BLD: 57 % (ref 40–73)
NEUTS SEG NFR BLD: 62 % (ref 40–73)
NEUTS SEG NFR BLD: 65 % (ref 40–73)
NEUTS SEG NFR BLD: 69 % (ref 42–75)
NEUTS SEG NFR BLD: 72 % (ref 40–73)
NEUTS SEG NFR BLD: 88 % (ref 40–73)
NITRITE UR QL STRIP.AUTO: NEGATIVE
NITRITE UR QL STRIP.AUTO: POSITIVE
O2/TOTAL GAS SETTING VFR VENT: 48 %
OSMOLALITY SERPL: 280 MOSM/KG H2O (ref 280–300)
OSMOLALITY UR: 123 MOSM/KG H2O (ref 300–900)
OTHER,OTHU: ABNORMAL
P-R INTERVAL, ECG05: 146 MS
P-R INTERVAL, ECG05: 168 MS
P-R INTERVAL, ECG05: 174 MS
PCO2 BLD: 35.9 MMHG (ref 35–45)
PH BLD: 7.23 [PH] (ref 7.35–7.45)
PH UR STRIP: 5 [PH] (ref 5–8)
PH UR STRIP: 5.5 [PH] (ref 4.6–8)
PH UR STRIP: 5.5 [PH] (ref 5–8)
PH UR STRIP: 6 [PH] (ref 5–8)
PH UR STRIP: 6 [PH] (ref 5–8)
PH UR STRIP: 8.5 [PH] (ref 5–8)
PHOSPHATE SERPL-MCNC: 3.2 MG/DL (ref 2.5–4.9)
PHOSPHATE SERPL-MCNC: 3.8 MG/DL (ref 2.5–4.9)
PHOSPHATE SERPL-MCNC: 4.7 MG/DL (ref 2.5–4.9)
PLATELET # BLD AUTO: 205 K/UL (ref 135–420)
PLATELET # BLD AUTO: 220 K/UL (ref 135–420)
PLATELET # BLD AUTO: 229 K/UL (ref 135–420)
PLATELET # BLD AUTO: 242 K/UL (ref 135–420)
PLATELET # BLD AUTO: 251 K/UL (ref 135–420)
PLATELET # BLD AUTO: 253 K/UL (ref 135–420)
PLATELET # BLD AUTO: 317 K/UL (ref 135–420)
PLATELET # BLD AUTO: 335 K/UL (ref 135–420)
PLATELET # BLD AUTO: 375 K/UL (ref 135–420)
PLATELET COMMENTS,PCOM: ABNORMAL
PLATELET COMMENTS,PCOM: ABNORMAL
PMV BLD AUTO: 10.2 FL (ref 9.2–11.8)
PMV BLD AUTO: 10.5 FL (ref 9.2–11.8)
PMV BLD AUTO: 10.7 FL (ref 9.2–11.8)
PMV BLD AUTO: 9 FL (ref 9.2–11.8)
PMV BLD AUTO: 9.1 FL (ref 9.2–11.8)
PMV BLD AUTO: 9.4 FL (ref 9.2–11.8)
PMV BLD AUTO: 9.5 FL (ref 9.2–11.8)
PO2 BLD: 55 MMHG (ref 80–100)
POTASSIUM SERPL-SCNC: 3.3 MMOL/L (ref 3.5–5.5)
POTASSIUM SERPL-SCNC: 3.3 MMOL/L (ref 3.5–5.5)
POTASSIUM SERPL-SCNC: 3.4 MMOL/L (ref 3.5–5.5)
POTASSIUM SERPL-SCNC: 3.5 MMOL/L (ref 3.5–5.5)
POTASSIUM SERPL-SCNC: 3.6 MMOL/L (ref 3.5–5.5)
POTASSIUM SERPL-SCNC: 3.8 MMOL/L (ref 3.5–5.5)
POTASSIUM SERPL-SCNC: 3.9 MMOL/L (ref 3.5–5.5)
POTASSIUM SERPL-SCNC: 4 MMOL/L (ref 3.5–5.5)
POTASSIUM SERPL-SCNC: 4.1 MMOL/L (ref 3.5–5.5)
POTASSIUM SERPL-SCNC: 4.4 MMOL/L (ref 3.5–5.5)
POTASSIUM UR-SCNC: 7 MMOL/L (ref 12–62)
PROT SERPL-MCNC: 5.6 G/DL (ref 6.4–8.2)
PROT SERPL-MCNC: 6 G/DL (ref 6.4–8.2)
PROT SERPL-MCNC: 7.4 G/DL (ref 6.4–8.2)
PROT SERPL-MCNC: 8.3 G/DL (ref 6.4–8.2)
PROT UR QL STRIP: NEGATIVE
PROT UR STRIP-MCNC: 30 MG/DL
PROT UR STRIP-MCNC: 300 MG/DL
PROT UR STRIP-MCNC: 300 MG/DL
PROT UR STRIP-MCNC: >300 MG/DL
PROT UR STRIP-MCNC: >300 MG/DL
PROTHROMBIN TIME: 12 SEC (ref 11.5–15.2)
PROTHROMBIN TIME: 12.3 SEC (ref 11.5–15.2)
PROTHROMBIN TIME: 12.9 SEC (ref 11.5–15.2)
Q-T INTERVAL, ECG07: 282 MS
Q-T INTERVAL, ECG07: 390 MS
Q-T INTERVAL, ECG07: 412 MS
Q-T INTERVAL, ECG07: 464 MS
QRS DURATION, ECG06: 66 MS
QRS DURATION, ECG06: 70 MS
QRS DURATION, ECG06: 72 MS
QRS DURATION, ECG06: 78 MS
QTC CALCULATION (BEZET), ECG08: 443 MS
QTC CALCULATION (BEZET), ECG08: 447 MS
QTC CALCULATION (BEZET), ECG08: 465 MS
QTC CALCULATION (BEZET), ECG08: 472 MS
RBC # BLD AUTO: 3.55 M/UL (ref 4.2–5.3)
RBC # BLD AUTO: 3.71 M/UL (ref 4.2–5.3)
RBC # BLD AUTO: 3.78 M/UL (ref 4.2–5.3)
RBC # BLD AUTO: 3.88 M/UL (ref 4.2–5.3)
RBC # BLD AUTO: 4.03 M/UL (ref 4.2–5.3)
RBC # BLD AUTO: 4.17 M/UL (ref 4.2–5.3)
RBC # BLD AUTO: 4.39 M/UL (ref 4.2–5.3)
RBC # BLD AUTO: 4.46 M/UL (ref 4.2–5.3)
RBC # BLD AUTO: 4.98 M/UL (ref 4.2–5.3)
RBC #/AREA URNS HPF: ABNORMAL /HPF (ref 0–5)
RBC #/AREA URNS HPF: NORMAL /HPF (ref 0–5)
RBC MORPH BLD: ABNORMAL
RBC MORPH BLD: ABNORMAL
SAO2 % BLD: 83 % (ref 92–97)
SERVICE CMNT-IMP: ABNORMAL
SERVICE CMNT-IMP: NORMAL
SERVICE CMNT-IMP: NORMAL
SODIUM SERPL-SCNC: 126 MMOL/L (ref 136–145)
SODIUM SERPL-SCNC: 128 MMOL/L (ref 136–145)
SODIUM SERPL-SCNC: 128 MMOL/L (ref 136–145)
SODIUM SERPL-SCNC: 129 MMOL/L (ref 136–145)
SODIUM SERPL-SCNC: 130 MMOL/L (ref 136–145)
SODIUM SERPL-SCNC: 135 MMOL/L (ref 136–145)
SODIUM SERPL-SCNC: 136 MMOL/L (ref 136–145)
SODIUM SERPL-SCNC: 136 MMOL/L (ref 136–145)
SODIUM SERPL-SCNC: 137 MMOL/L (ref 136–145)
SODIUM SERPL-SCNC: 137 MMOL/L (ref 136–145)
SODIUM UR-SCNC: 41 MMOL/L (ref 20–110)
SP GR UR REFRACTOMETRY: 1.01 (ref 1–1.03)
SP GR UR STRIP: 1 (ref 1–1.03)
SPECIMEN TYPE: ABNORMAL
TOTAL RESP. RATE, ITRR: 24
TROPONIN I SERPL-MCNC: <0.02 NG/ML (ref 0–0.04)
TSH SERPL DL<=0.05 MIU/L-ACNC: 0.91 UIU/ML (ref 0.36–3.74)
UA UROBILINOGEN AMB POC: NORMAL (ref 0.2–1)
URINALYSIS CLARITY POC: NORMAL
URINALYSIS COLOR POC: YELLOW
URINE BLOOD POC: NORMAL
URINE LEUKOCYTES POC: NORMAL
URINE NITRITES POC: NEGATIVE
UROBILINOGEN UR QL STRIP.AUTO: 0.2 EU/DL (ref 0.2–1)
UROBILINOGEN UR QL STRIP.AUTO: 4 EU/DL (ref 0.2–1)
VENTRICULAR RATE, ECG03: 164 BPM
VENTRICULAR RATE, ECG03: 55 BPM
VENTRICULAR RATE, ECG03: 79 BPM
VENTRICULAR RATE, ECG03: 79 BPM
WBC # BLD AUTO: 13.5 K/UL (ref 4.6–13.2)
WBC # BLD AUTO: 16.5 K/UL (ref 4.6–13.2)
WBC # BLD AUTO: 3.5 K/UL (ref 4.6–13.2)
WBC # BLD AUTO: 4.4 K/UL (ref 4.6–13.2)
WBC # BLD AUTO: 4.6 K/UL (ref 4.6–13.2)
WBC # BLD AUTO: 5.5 K/UL (ref 4.6–13.2)
WBC # BLD AUTO: 6.4 K/UL (ref 4.6–13.2)
WBC # BLD AUTO: 9.2 K/UL (ref 4.6–13.2)
WBC # BLD AUTO: 9.7 K/UL (ref 4.6–13.2)
WBC MORPH BLD: ABNORMAL
WBC URNS QL MICRO: ABNORMAL /HPF (ref 0–4)
WBC URNS QL MICRO: NORMAL /HPF (ref 0–4)

## 2018-01-01 PROCEDURE — 74011250637 HC RX REV CODE- 250/637: Performed by: SPECIALIST

## 2018-01-01 PROCEDURE — 82962 GLUCOSE BLOOD TEST: CPT

## 2018-01-01 PROCEDURE — 71045 X-RAY EXAM CHEST 1 VIEW: CPT

## 2018-01-01 PROCEDURE — 74011250636 HC RX REV CODE- 250/636: Performed by: EMERGENCY MEDICINE

## 2018-01-01 PROCEDURE — 74011636637 HC RX REV CODE- 636/637: Performed by: PHYSICIAN ASSISTANT

## 2018-01-01 PROCEDURE — 76010000149 HC OR TIME 1 TO 1.5 HR: Performed by: UROLOGY

## 2018-01-01 PROCEDURE — 36415 COLL VENOUS BLD VENIPUNCTURE: CPT | Performed by: INTERNAL MEDICINE

## 2018-01-01 PROCEDURE — 83690 ASSAY OF LIPASE: CPT | Performed by: FAMILY MEDICINE

## 2018-01-01 PROCEDURE — 85025 COMPLETE CBC W/AUTO DIFF WBC: CPT | Performed by: FAMILY MEDICINE

## 2018-01-01 PROCEDURE — 80048 BASIC METABOLIC PNL TOTAL CA: CPT | Performed by: FAMILY MEDICINE

## 2018-01-01 PROCEDURE — 74011636637 HC RX REV CODE- 636/637: Performed by: NURSE ANESTHETIST, CERTIFIED REGISTERED

## 2018-01-01 PROCEDURE — A9585 GADOBUTROL INJECTION: HCPCS | Performed by: OPHTHALMOLOGY

## 2018-01-01 PROCEDURE — 82570 ASSAY OF URINE CREATININE: CPT | Performed by: INTERNAL MEDICINE

## 2018-01-01 PROCEDURE — 73501 X-RAY EXAM HIP UNI 1 VIEW: CPT

## 2018-01-01 PROCEDURE — 82550 ASSAY OF CK (CPK): CPT | Performed by: FAMILY MEDICINE

## 2018-01-01 PROCEDURE — 88305 TISSUE EXAM BY PATHOLOGIST: CPT | Performed by: UROLOGY

## 2018-01-01 PROCEDURE — 74011250637 HC RX REV CODE- 250/637: Performed by: INTERNAL MEDICINE

## 2018-01-01 PROCEDURE — 74011250636 HC RX REV CODE- 250/636: Performed by: NURSE ANESTHETIST, CERTIFIED REGISTERED

## 2018-01-01 PROCEDURE — 02HV33Z INSERTION OF INFUSION DEVICE INTO SUPERIOR VENA CAVA, PERCUTANEOUS APPROACH: ICD-10-PCS | Performed by: PHYSICIAN ASSISTANT

## 2018-01-01 PROCEDURE — 74011250637 HC RX REV CODE- 250/637: Performed by: PHYSICIAN ASSISTANT

## 2018-01-01 PROCEDURE — 93005 ELECTROCARDIOGRAM TRACING: CPT

## 2018-01-01 PROCEDURE — 85027 COMPLETE CBC AUTOMATED: CPT | Performed by: UROLOGY

## 2018-01-01 PROCEDURE — 36592 COLLECT BLOOD FROM PICC: CPT

## 2018-01-01 PROCEDURE — 85730 THROMBOPLASTIN TIME PARTIAL: CPT | Performed by: UROLOGY

## 2018-01-01 PROCEDURE — 74011250636 HC RX REV CODE- 250/636: Performed by: INTERNAL MEDICINE

## 2018-01-01 PROCEDURE — 82550 ASSAY OF CK (CPK): CPT | Performed by: EMERGENCY MEDICINE

## 2018-01-01 PROCEDURE — 84100 ASSAY OF PHOSPHORUS: CPT | Performed by: FAMILY MEDICINE

## 2018-01-01 PROCEDURE — 65270000029 HC RM PRIVATE

## 2018-01-01 PROCEDURE — 77030020186 HC BOOT HL PROTCT SAGE -B

## 2018-01-01 PROCEDURE — 74011250636 HC RX REV CODE- 250/636: Performed by: STUDENT IN AN ORGANIZED HEALTH CARE EDUCATION/TRAINING PROGRAM

## 2018-01-01 PROCEDURE — 77030038269 HC DRN EXT URIN PURWCK BARD -A

## 2018-01-01 PROCEDURE — 76857 US EXAM PELVIC LIMITED: CPT

## 2018-01-01 PROCEDURE — 84443 ASSAY THYROID STIM HORMONE: CPT | Performed by: FAMILY MEDICINE

## 2018-01-01 PROCEDURE — 74011250636 HC RX REV CODE- 250/636

## 2018-01-01 PROCEDURE — 87186 SC STD MICRODIL/AGAR DIL: CPT

## 2018-01-01 PROCEDURE — 96361 HYDRATE IV INFUSION ADD-ON: CPT

## 2018-01-01 PROCEDURE — 76210000016 HC OR PH I REC 1 TO 1.5 HR: Performed by: SPECIALIST

## 2018-01-01 PROCEDURE — 85730 THROMBOPLASTIN TIME PARTIAL: CPT | Performed by: EMERGENCY MEDICINE

## 2018-01-01 PROCEDURE — 81001 URINALYSIS AUTO W/SCOPE: CPT | Performed by: EMERGENCY MEDICINE

## 2018-01-01 PROCEDURE — 74011000250 HC RX REV CODE- 250

## 2018-01-01 PROCEDURE — 80053 COMPREHEN METABOLIC PANEL: CPT | Performed by: FAMILY MEDICINE

## 2018-01-01 PROCEDURE — 87086 URINE CULTURE/COLONY COUNT: CPT | Performed by: EMERGENCY MEDICINE

## 2018-01-01 PROCEDURE — C1769 GUIDE WIRE: HCPCS | Performed by: UROLOGY

## 2018-01-01 PROCEDURE — 74011250636 HC RX REV CODE- 250/636: Performed by: PHYSICIAN ASSISTANT

## 2018-01-01 PROCEDURE — 81001 URINALYSIS AUTO W/SCOPE: CPT | Performed by: PHYSICIAN ASSISTANT

## 2018-01-01 PROCEDURE — P9045 ALBUMIN (HUMAN), 5%, 250 ML: HCPCS | Performed by: PHYSICIAN ASSISTANT

## 2018-01-01 PROCEDURE — 99285 EMERGENCY DEPT VISIT HI MDM: CPT

## 2018-01-01 PROCEDURE — 83690 ASSAY OF LIPASE: CPT

## 2018-01-01 PROCEDURE — 77030027138 HC INCENT SPIROMETER -A

## 2018-01-01 PROCEDURE — 74011000258 HC RX REV CODE- 258: Performed by: PHYSICIAN ASSISTANT

## 2018-01-01 PROCEDURE — 85730 THROMBOPLASTIN TIME PARTIAL: CPT | Performed by: INTERNAL MEDICINE

## 2018-01-01 PROCEDURE — 85025 COMPLETE CBC W/AUTO DIFF WBC: CPT | Performed by: EMERGENCY MEDICINE

## 2018-01-01 PROCEDURE — 77010033678 HC OXYGEN DAILY: Performed by: SPECIALIST

## 2018-01-01 PROCEDURE — 80048 BASIC METABOLIC PNL TOTAL CA: CPT | Performed by: EMERGENCY MEDICINE

## 2018-01-01 PROCEDURE — 96374 THER/PROPH/DIAG INJ IV PUSH: CPT

## 2018-01-01 PROCEDURE — 74011250636 HC RX REV CODE- 250/636: Performed by: OPHTHALMOLOGY

## 2018-01-01 PROCEDURE — 77030020441 HC CATH IMGR TRQ BERN BSC -B: Performed by: UROLOGY

## 2018-01-01 PROCEDURE — 36415 COLL VENOUS BLD VENIPUNCTURE: CPT | Performed by: UROLOGY

## 2018-01-01 PROCEDURE — 85025 COMPLETE CBC W/AUTO DIFF WBC: CPT | Performed by: INTERNAL MEDICINE

## 2018-01-01 PROCEDURE — 84100 ASSAY OF PHOSPHORUS: CPT | Performed by: INTERNAL MEDICINE

## 2018-01-01 PROCEDURE — 80048 BASIC METABOLIC PNL TOTAL CA: CPT | Performed by: UROLOGY

## 2018-01-01 PROCEDURE — 83735 ASSAY OF MAGNESIUM: CPT | Performed by: FAMILY MEDICINE

## 2018-01-01 PROCEDURE — 77030034696 HC CATH URETH FOL 2W BARD -A: Performed by: UROLOGY

## 2018-01-01 PROCEDURE — 77030018836 HC SOL IRR NACL ICUM -A: Performed by: UROLOGY

## 2018-01-01 PROCEDURE — C2617 STENT, NON-COR, TEM W/O DEL: HCPCS | Performed by: UROLOGY

## 2018-01-01 PROCEDURE — 80048 BASIC METABOLIC PNL TOTAL CA: CPT | Performed by: INTERNAL MEDICINE

## 2018-01-01 PROCEDURE — 74011000258 HC RX REV CODE- 258: Performed by: INTERNAL MEDICINE

## 2018-01-01 PROCEDURE — 76060000033 HC ANESTHESIA 1 TO 1.5 HR: Performed by: SPECIALIST

## 2018-01-01 PROCEDURE — 77030010509 HC AIRWY LMA MSK TELE -A: Performed by: ANESTHESIOLOGY

## 2018-01-01 PROCEDURE — 65610000006 HC RM INTENSIVE CARE

## 2018-01-01 PROCEDURE — 83930 ASSAY OF BLOOD OSMOLALITY: CPT | Performed by: INTERNAL MEDICINE

## 2018-01-01 PROCEDURE — 77030020782 HC GWN BAIR PAWS FLX 3M -B: Performed by: SPECIALIST

## 2018-01-01 PROCEDURE — 80053 COMPREHEN METABOLIC PANEL: CPT | Performed by: EMERGENCY MEDICINE

## 2018-01-01 PROCEDURE — 77030034850

## 2018-01-01 PROCEDURE — 85730 THROMBOPLASTIN TIME PARTIAL: CPT | Performed by: PHYSICIAN ASSISTANT

## 2018-01-01 PROCEDURE — 51798 US URINE CAPACITY MEASURE: CPT

## 2018-01-01 PROCEDURE — 87086 URINE CULTURE/COLONY COUNT: CPT | Performed by: INTERNAL MEDICINE

## 2018-01-01 PROCEDURE — 97162 PT EVAL MOD COMPLEX 30 MIN: CPT

## 2018-01-01 PROCEDURE — 76210000006 HC OR PH I REC 0.5 TO 1 HR: Performed by: UROLOGY

## 2018-01-01 PROCEDURE — 77010033678 HC OXYGEN DAILY

## 2018-01-01 PROCEDURE — 74011250637 HC RX REV CODE- 250/637: Performed by: STUDENT IN AN ORGANIZED HEALTH CARE EDUCATION/TRAINING PROGRAM

## 2018-01-01 PROCEDURE — 76060000033 HC ANESTHESIA 1 TO 1.5 HR: Performed by: UROLOGY

## 2018-01-01 PROCEDURE — 65660000000 HC RM CCU STEPDOWN

## 2018-01-01 PROCEDURE — 84300 ASSAY OF URINE SODIUM: CPT | Performed by: INTERNAL MEDICINE

## 2018-01-01 PROCEDURE — 36556 INSERT NON-TUNNEL CV CATH: CPT

## 2018-01-01 PROCEDURE — 74011636637 HC RX REV CODE- 636/637: Performed by: INTERNAL MEDICINE

## 2018-01-01 PROCEDURE — 77030010509 HC AIRWY LMA MSK TELE -A: Performed by: NURSE ANESTHETIST, CERTIFIED REGISTERED

## 2018-01-01 PROCEDURE — 80076 HEPATIC FUNCTION PANEL: CPT | Performed by: FAMILY MEDICINE

## 2018-01-01 PROCEDURE — 82803 BLOOD GASES ANY COMBINATION: CPT

## 2018-01-01 PROCEDURE — 77030012863 HC BG URIN LEG HOLL -A: Performed by: UROLOGY

## 2018-01-01 PROCEDURE — 85025 COMPLETE CBC W/AUTO DIFF WBC: CPT

## 2018-01-01 PROCEDURE — 80053 COMPREHEN METABOLIC PANEL: CPT

## 2018-01-01 PROCEDURE — 76010000149 HC OR TIME 1 TO 1.5 HR: Performed by: SPECIALIST

## 2018-01-01 PROCEDURE — 74011000250 HC RX REV CODE- 250: Performed by: INTERNAL MEDICINE

## 2018-01-01 PROCEDURE — 97530 THERAPEUTIC ACTIVITIES: CPT

## 2018-01-01 PROCEDURE — 77030019927 HC TBNG IRR CYSTO BAXT -A: Performed by: UROLOGY

## 2018-01-01 PROCEDURE — 74011000250 HC RX REV CODE- 250: Performed by: NURSE ANESTHETIST, CERTIFIED REGISTERED

## 2018-01-01 PROCEDURE — 36600 WITHDRAWAL OF ARTERIAL BLOOD: CPT

## 2018-01-01 PROCEDURE — 82565 ASSAY OF CREATININE: CPT

## 2018-01-01 PROCEDURE — 83605 ASSAY OF LACTIC ACID: CPT

## 2018-01-01 PROCEDURE — 5A2204Z RESTORATION OF CARDIAC RHYTHM, SINGLE: ICD-10-PCS

## 2018-01-01 PROCEDURE — 76210000021 HC REC RM PH II 0.5 TO 1 HR: Performed by: UROLOGY

## 2018-01-01 PROCEDURE — 83735 ASSAY OF MAGNESIUM: CPT | Performed by: INTERNAL MEDICINE

## 2018-01-01 PROCEDURE — 74011250636 HC RX REV CODE- 250/636: Performed by: FAMILY MEDICINE

## 2018-01-01 PROCEDURE — C1758 CATHETER, URETERAL: HCPCS | Performed by: UROLOGY

## 2018-01-01 PROCEDURE — 83935 ASSAY OF URINE OSMOLALITY: CPT | Performed by: INTERNAL MEDICINE

## 2018-01-01 PROCEDURE — 93306 TTE W/DOPPLER COMPLETE: CPT

## 2018-01-01 PROCEDURE — 70549 MR ANGIOGRAPH NECK W/O&W/DYE: CPT

## 2018-01-01 PROCEDURE — 85610 PROTHROMBIN TIME: CPT | Performed by: EMERGENCY MEDICINE

## 2018-01-01 PROCEDURE — 70544 MR ANGIOGRAPHY HEAD W/O DYE: CPT

## 2018-01-01 PROCEDURE — 97165 OT EVAL LOW COMPLEX 30 MIN: CPT

## 2018-01-01 PROCEDURE — A4216 STERILE WATER/SALINE, 10 ML: HCPCS | Performed by: FAMILY MEDICINE

## 2018-01-01 PROCEDURE — 83036 HEMOGLOBIN GLYCOSYLATED A1C: CPT | Performed by: INTERNAL MEDICINE

## 2018-01-01 PROCEDURE — 96376 TX/PRO/DX INJ SAME DRUG ADON: CPT

## 2018-01-01 PROCEDURE — 87077 CULTURE AEROBIC IDENTIFY: CPT | Performed by: EMERGENCY MEDICINE

## 2018-01-01 PROCEDURE — 87040 BLOOD CULTURE FOR BACTERIA: CPT | Performed by: FAMILY MEDICINE

## 2018-01-01 PROCEDURE — 36415 COLL VENOUS BLD VENIPUNCTURE: CPT | Performed by: PHYSICIAN ASSISTANT

## 2018-01-01 PROCEDURE — 71250 CT THORAX DX C-: CPT

## 2018-01-01 PROCEDURE — 96375 TX/PRO/DX INJ NEW DRUG ADDON: CPT

## 2018-01-01 PROCEDURE — 87186 SC STD MICRODIL/AGAR DIL: CPT | Performed by: INTERNAL MEDICINE

## 2018-01-01 PROCEDURE — 85610 PROTHROMBIN TIME: CPT | Performed by: UROLOGY

## 2018-01-01 PROCEDURE — 83605 ASSAY OF LACTIC ACID: CPT | Performed by: FAMILY MEDICINE

## 2018-01-01 PROCEDURE — 82553 CREATINE MB FRACTION: CPT

## 2018-01-01 PROCEDURE — 73502 X-RAY EXAM HIP UNI 2-3 VIEWS: CPT

## 2018-01-01 PROCEDURE — 87086 URINE CULTURE/COLONY COUNT: CPT | Performed by: NURSE PRACTITIONER

## 2018-01-01 PROCEDURE — 74018 RADEX ABDOMEN 1 VIEW: CPT

## 2018-01-01 PROCEDURE — 77030008771 HC TU NG SALEM SUMP -A

## 2018-01-01 PROCEDURE — 74011250636 HC RX REV CODE- 250/636: Performed by: SPECIALIST

## 2018-01-01 PROCEDURE — 74011250636 HC RX REV CODE- 250/636: Performed by: UROLOGY

## 2018-01-01 PROCEDURE — 74011636320 HC RX REV CODE- 636/320: Performed by: UROLOGY

## 2018-01-01 PROCEDURE — 81001 URINALYSIS AUTO W/SCOPE: CPT

## 2018-01-01 PROCEDURE — 74011636320 HC RX REV CODE- 636/320: Performed by: EMERGENCY MEDICINE

## 2018-01-01 PROCEDURE — 77030032490 HC SLV COMPR SCD KNE COVD -B: Performed by: UROLOGY

## 2018-01-01 PROCEDURE — 97116 GAIT TRAINING THERAPY: CPT

## 2018-01-01 PROCEDURE — 74177 CT ABD & PELVIS W/CONTRAST: CPT

## 2018-01-01 PROCEDURE — 87186 SC STD MICRODIL/AGAR DIL: CPT | Performed by: EMERGENCY MEDICINE

## 2018-01-01 PROCEDURE — 99284 EMERGENCY DEPT VISIT MOD MDM: CPT

## 2018-01-01 PROCEDURE — 87086 URINE CULTURE/COLONY COUNT: CPT

## 2018-01-01 PROCEDURE — 74420 UROGRAPHY RTRGR +-KUB: CPT

## 2018-01-01 PROCEDURE — 77030012012 HC AIRWY OP SFT TELE -A: Performed by: NURSE ANESTHETIST, CERTIFIED REGISTERED

## 2018-01-01 PROCEDURE — 74176 CT ABD & PELVIS W/O CONTRAST: CPT

## 2018-01-01 PROCEDURE — 84133 ASSAY OF URINE POTASSIUM: CPT | Performed by: INTERNAL MEDICINE

## 2018-01-01 PROCEDURE — 77030005546 HC CATH URETH FOL 3W BARD -A

## 2018-01-01 PROCEDURE — 77010033711 HC HIGH FLOW OXYGEN

## 2018-01-01 PROCEDURE — 0SW9XJZ REVISION OF SYNTHETIC SUBSTITUTE IN RIGHT HIP JOINT, EXTERNAL APPROACH: ICD-10-PCS | Performed by: SPECIALIST

## 2018-01-01 PROCEDURE — 74011000250 HC RX REV CODE- 250: Performed by: EMERGENCY MEDICINE

## 2018-01-01 PROCEDURE — 77030020782 HC GWN BAIR PAWS FLX 3M -B: Performed by: UROLOGY

## 2018-01-01 PROCEDURE — 51702 INSERT TEMP BLADDER CATH: CPT

## 2018-01-01 PROCEDURE — 74011000250 HC RX REV CODE- 250: Performed by: PHYSICIAN ASSISTANT

## 2018-01-01 PROCEDURE — 87077 CULTURE AEROBIC IDENTIFY: CPT

## 2018-01-01 PROCEDURE — C1751 CATH, INF, PER/CENT/MIDLINE: HCPCS

## 2018-01-01 DEVICE — VARIABLE LENGTH URETERAL STENT
Type: IMPLANTABLE DEVICE | Status: FUNCTIONAL
Brand: CONTOUR VL™

## 2018-01-01 RX ORDER — ESCITALOPRAM OXALATE 10 MG/1
TABLET ORAL
Qty: 90 TAB | Refills: 2 | Status: SHIPPED | OUTPATIENT
Start: 2018-01-01 | End: 2018-01-01 | Stop reason: DRUGHIGH

## 2018-01-01 RX ORDER — METFORMIN HYDROCHLORIDE 500 MG/1
500 TABLET ORAL 2 TIMES DAILY WITH MEALS
COMMUNITY

## 2018-01-01 RX ORDER — LORAZEPAM 0.5 MG/1
0.5 TABLET ORAL
Qty: 30 TAB | Refills: 0 | OUTPATIENT
Start: 2018-01-01

## 2018-01-01 RX ORDER — SODIUM CHLORIDE 0.9 % (FLUSH) 0.9 %
5-10 SYRINGE (ML) INJECTION AS NEEDED
Status: DISCONTINUED | OUTPATIENT
Start: 2018-01-01 | End: 2018-01-01 | Stop reason: HOSPADM

## 2018-01-01 RX ORDER — FENTANYL CITRATE 50 UG/ML
50 INJECTION, SOLUTION INTRAMUSCULAR; INTRAVENOUS
Status: DISCONTINUED | OUTPATIENT
Start: 2018-01-01 | End: 2018-01-01 | Stop reason: HOSPADM

## 2018-01-01 RX ORDER — MAGNESIUM SULFATE 1 G/100ML
1 INJECTION INTRAVENOUS ONCE
Status: COMPLETED | OUTPATIENT
Start: 2018-01-01 | End: 2018-01-01

## 2018-01-01 RX ORDER — ESCITALOPRAM OXALATE 20 MG/1
20 TABLET ORAL DAILY
Qty: 90 TAB | Refills: 1 | Status: SHIPPED | OUTPATIENT
Start: 2018-01-01

## 2018-01-01 RX ORDER — INSULIN LISPRO 100 [IU]/ML
INJECTION, SOLUTION INTRAVENOUS; SUBCUTANEOUS ONCE
Status: DISCONTINUED | OUTPATIENT
Start: 2018-01-01 | End: 2018-01-01 | Stop reason: HOSPADM

## 2018-01-01 RX ORDER — METOCLOPRAMIDE 5 MG/1
5 TABLET ORAL
Qty: 12 TAB | Refills: 0 | Status: SHIPPED | OUTPATIENT
Start: 2018-01-01 | End: 2018-01-01 | Stop reason: SDUPTHER

## 2018-01-01 RX ORDER — SODIUM CHLORIDE, SODIUM LACTATE, POTASSIUM CHLORIDE, CALCIUM CHLORIDE 600; 310; 30; 20 MG/100ML; MG/100ML; MG/100ML; MG/100ML
50 INJECTION, SOLUTION INTRAVENOUS CONTINUOUS
Status: DISPENSED | OUTPATIENT
Start: 2018-01-01 | End: 2018-01-01

## 2018-01-01 RX ORDER — ESCITALOPRAM OXALATE 10 MG/1
10 TABLET ORAL DAILY
Status: DISCONTINUED | OUTPATIENT
Start: 2018-01-01 | End: 2018-01-01 | Stop reason: HOSPADM

## 2018-01-01 RX ORDER — INSULIN GLARGINE 100 [IU]/ML
10 INJECTION, SOLUTION SUBCUTANEOUS DAILY
Status: DISCONTINUED | OUTPATIENT
Start: 2018-01-01 | End: 2018-01-01

## 2018-01-01 RX ORDER — MIDAZOLAM HYDROCHLORIDE 1 MG/ML
INJECTION, SOLUTION INTRAMUSCULAR; INTRAVENOUS
Status: COMPLETED
Start: 2018-01-01 | End: 2018-01-01

## 2018-01-01 RX ORDER — POTASSIUM CHLORIDE 7.45 MG/ML
10 INJECTION INTRAVENOUS
Status: COMPLETED | OUTPATIENT
Start: 2018-01-01 | End: 2018-01-01

## 2018-01-01 RX ORDER — UREA 10 %
1 LOTION (ML) TOPICAL ONCE
Status: COMPLETED | OUTPATIENT
Start: 2018-01-01 | End: 2018-01-01

## 2018-01-01 RX ORDER — HEPARIN SODIUM 10000 [USP'U]/100ML
18-36 INJECTION, SOLUTION INTRAVENOUS
Status: DISCONTINUED | OUTPATIENT
Start: 2018-01-01 | End: 2018-01-01

## 2018-01-01 RX ORDER — LEVOTHYROXINE SODIUM 100 UG/1
100 TABLET ORAL
Status: DISCONTINUED | OUTPATIENT
Start: 2018-01-01 | End: 2018-01-01

## 2018-01-01 RX ORDER — CIPROFLOXACIN 500 MG/1
500 TABLET ORAL 2 TIMES DAILY
Qty: 10 TAB | Refills: 0 | Status: SHIPPED | OUTPATIENT
Start: 2018-01-01 | End: 2018-01-01 | Stop reason: ALTCHOICE

## 2018-01-01 RX ORDER — DEXTROSE 50 % IN WATER (D50W) INTRAVENOUS SYRINGE
25-50 AS NEEDED
Status: DISCONTINUED | OUTPATIENT
Start: 2018-01-01 | End: 2018-01-01 | Stop reason: HOSPADM

## 2018-01-01 RX ORDER — OXYCODONE AND ACETAMINOPHEN 5; 325 MG/1; MG/1
1 TABLET ORAL
Status: DISCONTINUED | OUTPATIENT
Start: 2018-01-01 | End: 2018-01-01 | Stop reason: HOSPADM

## 2018-01-01 RX ORDER — SODIUM CHLORIDE 0.9 % (FLUSH) 0.9 %
5-10 SYRINGE (ML) INJECTION EVERY 8 HOURS
Status: DISCONTINUED | OUTPATIENT
Start: 2018-01-01 | End: 2018-01-01 | Stop reason: HOSPADM

## 2018-01-01 RX ORDER — MORPHINE SULFATE 4 MG/ML
4 INJECTION INTRAVENOUS
Status: COMPLETED | OUTPATIENT
Start: 2018-01-01 | End: 2018-01-01

## 2018-01-01 RX ORDER — CIPROFLOXACIN 2 MG/ML
400 INJECTION, SOLUTION INTRAVENOUS EVERY 12 HOURS
Status: DISCONTINUED | OUTPATIENT
Start: 2018-01-01 | End: 2018-01-01 | Stop reason: HOSPADM

## 2018-01-01 RX ORDER — PIPERACILLIN SODIUM, TAZOBACTAM SODIUM 2; .25 G/10ML; G/10ML
INJECTION, POWDER, LYOPHILIZED, FOR SOLUTION INTRAVENOUS
Status: DISPENSED
Start: 2018-01-01 | End: 2018-01-01

## 2018-01-01 RX ORDER — INSULIN LISPRO 100 [IU]/ML
INJECTION, SOLUTION INTRAVENOUS; SUBCUTANEOUS EVERY 6 HOURS
Status: DISCONTINUED | OUTPATIENT
Start: 2018-01-01 | End: 2018-01-01

## 2018-01-01 RX ORDER — MORPHINE SULFATE 4 MG/ML
2 INJECTION INTRAVENOUS
Status: DISCONTINUED | OUTPATIENT
Start: 2018-01-01 | End: 2018-01-01 | Stop reason: SDUPTHER

## 2018-01-01 RX ORDER — LORAZEPAM 2 MG/ML
INJECTION INTRAMUSCULAR
Status: COMPLETED
Start: 2018-01-01 | End: 2018-01-01

## 2018-01-01 RX ORDER — OXYCODONE HYDROCHLORIDE 5 MG/1
5 TABLET ORAL
Status: DISCONTINUED | OUTPATIENT
Start: 2018-01-01 | End: 2018-01-01 | Stop reason: HOSPADM

## 2018-01-01 RX ORDER — SULFAMETHOXAZOLE AND TRIMETHOPRIM 800; 160 MG/1; MG/1
1 TABLET ORAL 2 TIMES DAILY
Qty: 6 TAB | Refills: 0 | Status: SHIPPED | OUTPATIENT
Start: 2018-01-01 | End: 2018-01-01

## 2018-01-01 RX ORDER — ESCITALOPRAM OXALATE 10 MG/1
20 TABLET ORAL DAILY
Status: DISCONTINUED | OUTPATIENT
Start: 2018-01-01 | End: 2018-01-01

## 2018-01-01 RX ORDER — NALOXONE HYDROCHLORIDE 0.4 MG/ML
0.4 INJECTION, SOLUTION INTRAMUSCULAR; INTRAVENOUS; SUBCUTANEOUS AS NEEDED
Status: DISCONTINUED | OUTPATIENT
Start: 2018-01-01 | End: 2018-01-01 | Stop reason: HOSPADM

## 2018-01-01 RX ORDER — LORAZEPAM 0.5 MG/1
0.5 TABLET ORAL
Qty: 30 TAB | Refills: 0 | Status: SHIPPED | OUTPATIENT
Start: 2018-01-01 | End: 2018-01-01 | Stop reason: ALTCHOICE

## 2018-01-01 RX ORDER — LOSARTAN POTASSIUM 50 MG/1
50 TABLET ORAL DAILY
Status: DISCONTINUED | OUTPATIENT
Start: 2018-01-01 | End: 2018-01-01

## 2018-01-01 RX ORDER — LORAZEPAM 0.5 MG/1
0.5 TABLET ORAL
Status: DISCONTINUED | OUTPATIENT
Start: 2018-01-01 | End: 2018-01-01 | Stop reason: HOSPADM

## 2018-01-01 RX ORDER — LIDOCAINE HYDROCHLORIDE 20 MG/ML
INJECTION, SOLUTION EPIDURAL; INFILTRATION; INTRACAUDAL; PERINEURAL AS NEEDED
Status: DISCONTINUED | OUTPATIENT
Start: 2018-01-01 | End: 2018-01-01 | Stop reason: HOSPADM

## 2018-01-01 RX ORDER — CEFAZOLIN SODIUM 2 G/50ML
2 SOLUTION INTRAVENOUS ONCE
Status: COMPLETED | OUTPATIENT
Start: 2018-01-01 | End: 2018-01-01

## 2018-01-01 RX ORDER — SUCRALFATE 1 G/10ML
1 SUSPENSION ORAL
Status: COMPLETED | OUTPATIENT
Start: 2018-01-01 | End: 2018-01-01

## 2018-01-01 RX ORDER — HYDROCHLOROTHIAZIDE 25 MG/1
25 TABLET ORAL DAILY
Status: DISCONTINUED | OUTPATIENT
Start: 2018-01-01 | End: 2018-01-01

## 2018-01-01 RX ORDER — ONDANSETRON 2 MG/ML
4 INJECTION INTRAMUSCULAR; INTRAVENOUS ONCE
Status: DISCONTINUED | OUTPATIENT
Start: 2018-01-01 | End: 2018-01-01 | Stop reason: HOSPADM

## 2018-01-01 RX ORDER — LORAZEPAM 0.5 MG/1
0.5 TABLET ORAL
Qty: 60 TAB | Refills: 0 | OUTPATIENT
Start: 2018-01-01

## 2018-01-01 RX ORDER — MORPHINE SULFATE 2 MG/ML
2 INJECTION, SOLUTION INTRAMUSCULAR; INTRAVENOUS
Status: DISCONTINUED | OUTPATIENT
Start: 2018-01-01 | End: 2018-01-01 | Stop reason: HOSPADM

## 2018-01-01 RX ORDER — LORAZEPAM 2 MG/ML
2 INJECTION INTRAMUSCULAR
Status: DISCONTINUED | OUTPATIENT
Start: 2018-01-01 | End: 2018-01-01 | Stop reason: HOSPADM

## 2018-01-01 RX ORDER — SUCRALFATE 1 G/1
1 TABLET ORAL 4 TIMES DAILY
Qty: 30 TAB | Refills: 0 | Status: SHIPPED | OUTPATIENT
Start: 2018-01-01

## 2018-01-01 RX ORDER — SODIUM CHLORIDE, SODIUM LACTATE, POTASSIUM CHLORIDE, CALCIUM CHLORIDE 600; 310; 30; 20 MG/100ML; MG/100ML; MG/100ML; MG/100ML
75 INJECTION, SOLUTION INTRAVENOUS CONTINUOUS
Status: DISCONTINUED | OUTPATIENT
Start: 2018-01-01 | End: 2018-01-01 | Stop reason: HOSPADM

## 2018-01-01 RX ORDER — SODIUM CHLORIDE 9 MG/ML
125 INJECTION, SOLUTION INTRAVENOUS CONTINUOUS
Status: DISCONTINUED | OUTPATIENT
Start: 2018-01-01 | End: 2018-01-01 | Stop reason: HOSPADM

## 2018-01-01 RX ORDER — LEVOTHYROXINE SODIUM 100 UG/1
100 TABLET ORAL
Status: DISCONTINUED | OUTPATIENT
Start: 2018-01-01 | End: 2018-01-01 | Stop reason: HOSPADM

## 2018-01-01 RX ORDER — LORAZEPAM 2 MG/ML
0.5 INJECTION INTRAMUSCULAR ONCE
Status: COMPLETED | OUTPATIENT
Start: 2018-01-01 | End: 2018-01-01

## 2018-01-01 RX ORDER — GLIPIZIDE 10 MG/1
10 TABLET ORAL 2 TIMES DAILY
Qty: 60 TAB | Refills: 2 | Status: SHIPPED | OUTPATIENT
Start: 2018-01-01

## 2018-01-01 RX ORDER — ONDANSETRON 2 MG/ML
INJECTION INTRAMUSCULAR; INTRAVENOUS AS NEEDED
Status: DISCONTINUED | OUTPATIENT
Start: 2018-01-01 | End: 2018-01-01 | Stop reason: HOSPADM

## 2018-01-01 RX ORDER — ALBUMIN HUMAN 50 G/1000ML
25 SOLUTION INTRAVENOUS EVERY 6 HOURS
Status: DISCONTINUED | OUTPATIENT
Start: 2018-01-01 | End: 2018-01-01 | Stop reason: RX

## 2018-01-01 RX ORDER — GENTAMICIN SULFATE 80 MG/100ML
80 INJECTION, SOLUTION INTRAVENOUS ONCE
Status: COMPLETED | OUTPATIENT
Start: 2018-01-01 | End: 2018-01-01

## 2018-01-01 RX ORDER — MAGNESIUM SULFATE 100 %
4 CRYSTALS MISCELLANEOUS AS NEEDED
Status: DISCONTINUED | OUTPATIENT
Start: 2018-01-01 | End: 2018-01-01 | Stop reason: HOSPADM

## 2018-01-01 RX ORDER — AMLODIPINE BESYLATE 10 MG/1
10 TABLET ORAL DAILY
Status: DISCONTINUED | OUTPATIENT
Start: 2018-01-01 | End: 2018-01-01 | Stop reason: HOSPADM

## 2018-01-01 RX ORDER — ACETAMINOPHEN 325 MG/1
650 TABLET ORAL EVERY 6 HOURS
Status: DISCONTINUED | OUTPATIENT
Start: 2018-01-01 | End: 2018-01-01

## 2018-01-01 RX ORDER — SUCCINYLCHOLINE CHLORIDE 20 MG/ML INJECTION SOLUTION
SOLUTION AS NEEDED
Status: DISCONTINUED | OUTPATIENT
Start: 2018-01-01 | End: 2018-01-01 | Stop reason: HOSPADM

## 2018-01-01 RX ORDER — FENTANYL CITRATE 50 UG/ML
INJECTION, SOLUTION INTRAMUSCULAR; INTRAVENOUS AS NEEDED
Status: DISCONTINUED | OUTPATIENT
Start: 2018-01-01 | End: 2018-01-01 | Stop reason: HOSPADM

## 2018-01-01 RX ORDER — INSULIN LISPRO 100 [IU]/ML
INJECTION, SOLUTION INTRAVENOUS; SUBCUTANEOUS
Status: DISCONTINUED | OUTPATIENT
Start: 2018-01-01 | End: 2018-01-01 | Stop reason: HOSPADM

## 2018-01-01 RX ORDER — PROPOFOL 10 MG/ML
INJECTION, EMULSION INTRAVENOUS AS NEEDED
Status: DISCONTINUED | OUTPATIENT
Start: 2018-01-01 | End: 2018-01-01 | Stop reason: HOSPADM

## 2018-01-01 RX ORDER — FENTANYL CITRATE 50 UG/ML
25 INJECTION, SOLUTION INTRAMUSCULAR; INTRAVENOUS
Status: COMPLETED | OUTPATIENT
Start: 2018-01-01 | End: 2018-01-01

## 2018-01-01 RX ORDER — SODIUM CHLORIDE 9 MG/ML
125 INJECTION, SOLUTION INTRAVENOUS CONTINUOUS
Status: DISCONTINUED | OUTPATIENT
Start: 2018-01-01 | End: 2018-01-01

## 2018-01-01 RX ORDER — MIDAZOLAM HYDROCHLORIDE 1 MG/ML
1 INJECTION, SOLUTION INTRAMUSCULAR; INTRAVENOUS ONCE
Status: COMPLETED | OUTPATIENT
Start: 2018-01-01 | End: 2018-01-01

## 2018-01-01 RX ORDER — DIPHENHYDRAMINE HYDROCHLORIDE 50 MG/ML
12.5 INJECTION, SOLUTION INTRAMUSCULAR; INTRAVENOUS
Status: DISCONTINUED | OUTPATIENT
Start: 2018-01-01 | End: 2018-01-01 | Stop reason: HOSPADM

## 2018-01-01 RX ORDER — FUROSEMIDE 10 MG/ML
INJECTION INTRAMUSCULAR; INTRAVENOUS AS NEEDED
Status: DISCONTINUED | OUTPATIENT
Start: 2018-01-01 | End: 2018-01-01 | Stop reason: HOSPADM

## 2018-01-01 RX ORDER — CEFAZOLIN SODIUM 2 G/50ML
2 SOLUTION INTRAVENOUS ONCE
Status: CANCELLED | OUTPATIENT
Start: 2018-01-01 | End: 2018-01-01

## 2018-01-01 RX ORDER — WATER FOR INJECTION,STERILE
VIAL (ML) INJECTION
Status: COMPLETED
Start: 2018-01-01 | End: 2018-01-01

## 2018-01-01 RX ORDER — SODIUM CHLORIDE 0.9 % (FLUSH) 0.9 %
5-10 SYRINGE (ML) INJECTION EVERY 8 HOURS
Status: DISCONTINUED | OUTPATIENT
Start: 2018-01-01 | End: 2018-01-01

## 2018-01-01 RX ORDER — KETOROLAC TROMETHAMINE 30 MG/ML
30 INJECTION, SOLUTION INTRAMUSCULAR; INTRAVENOUS
Status: COMPLETED | OUTPATIENT
Start: 2018-01-01 | End: 2018-01-01

## 2018-01-01 RX ORDER — FENTANYL CITRATE 50 UG/ML
INJECTION, SOLUTION INTRAMUSCULAR; INTRAVENOUS
Status: COMPLETED
Start: 2018-01-01 | End: 2018-01-01

## 2018-01-01 RX ORDER — ACETAMINOPHEN 325 MG/1
650 TABLET ORAL
Status: DISCONTINUED | OUTPATIENT
Start: 2018-01-01 | End: 2018-01-01 | Stop reason: HOSPADM

## 2018-01-01 RX ORDER — CEFTRIAXONE 1 G/1
1 INJECTION, POWDER, FOR SOLUTION INTRAMUSCULAR; INTRAVENOUS
Status: COMPLETED | OUTPATIENT
Start: 2018-01-01 | End: 2018-01-01

## 2018-01-01 RX ORDER — ONDANSETRON 2 MG/ML
4 INJECTION INTRAMUSCULAR; INTRAVENOUS
Status: COMPLETED | OUTPATIENT
Start: 2018-01-01 | End: 2018-01-01

## 2018-01-01 RX ORDER — DOCUSATE SODIUM 100 MG/1
100 CAPSULE, LIQUID FILLED ORAL 2 TIMES DAILY
Status: DISCONTINUED | OUTPATIENT
Start: 2018-01-01 | End: 2018-01-01

## 2018-01-01 RX ORDER — SODIUM CHLORIDE 900 MG/100ML
INJECTION INTRAVENOUS
Status: DISPENSED
Start: 2018-01-01 | End: 2018-01-01

## 2018-01-01 RX ORDER — CEFTRIAXONE 250 MG/8ML
2000 INJECTION, POWDER, FOR SOLUTION INTRAMUSCULAR; INTRAVENOUS ONCE
Status: DISCONTINUED | OUTPATIENT
Start: 2018-01-01 | End: 2018-01-01 | Stop reason: RX

## 2018-01-01 RX ORDER — NAPROXEN 500 MG/1
500 TABLET ORAL
COMMUNITY

## 2018-01-01 RX ORDER — ALBUMIN HUMAN 50 G/1000ML
25 SOLUTION INTRAVENOUS EVERY 6 HOURS
Status: DISCONTINUED | OUTPATIENT
Start: 2018-01-01 | End: 2018-01-01 | Stop reason: SDUPTHER

## 2018-01-01 RX ORDER — GLIPIZIDE 5 MG/1
10 TABLET ORAL 2 TIMES DAILY
Status: DISCONTINUED | OUTPATIENT
Start: 2018-01-01 | End: 2018-01-01

## 2018-01-01 RX ORDER — WATER FOR INJECTION,STERILE
VIAL (ML) INJECTION
Status: DISPENSED
Start: 2018-01-01 | End: 2018-01-01

## 2018-01-01 RX ORDER — METFORMIN HYDROCHLORIDE 500 MG/1
500 TABLET ORAL 2 TIMES DAILY WITH MEALS
Status: DISCONTINUED | OUTPATIENT
Start: 2018-01-01 | End: 2018-01-01

## 2018-01-01 RX ORDER — ONDANSETRON 2 MG/ML
4 INJECTION INTRAMUSCULAR; INTRAVENOUS
Status: DISCONTINUED | OUTPATIENT
Start: 2018-01-01 | End: 2018-01-01 | Stop reason: HOSPADM

## 2018-01-01 RX ORDER — SODIUM CHLORIDE 9 MG/ML
75 INJECTION, SOLUTION INTRAVENOUS CONTINUOUS
Status: DISCONTINUED | OUTPATIENT
Start: 2018-01-01 | End: 2018-01-01 | Stop reason: HOSPADM

## 2018-01-01 RX ORDER — METOCLOPRAMIDE HYDROCHLORIDE 5 MG/ML
5 INJECTION INTRAMUSCULAR; INTRAVENOUS
Status: COMPLETED | OUTPATIENT
Start: 2018-01-01 | End: 2018-01-01

## 2018-01-01 RX ORDER — INSULIN LISPRO 100 [IU]/ML
INJECTION, SOLUTION INTRAVENOUS; SUBCUTANEOUS
Status: DISCONTINUED | OUTPATIENT
Start: 2018-01-01 | End: 2018-01-01

## 2018-01-01 RX ORDER — ESCITALOPRAM OXALATE 10 MG/1
10 TABLET ORAL ONCE
Status: ACTIVE | OUTPATIENT
Start: 2018-01-01 | End: 2018-01-01

## 2018-01-01 RX ORDER — FENTANYL CITRATE 50 UG/ML
25 INJECTION, SOLUTION INTRAMUSCULAR; INTRAVENOUS
Status: DISCONTINUED | OUTPATIENT
Start: 2018-01-01 | End: 2018-01-01 | Stop reason: HOSPADM

## 2018-01-01 RX ORDER — LEVOFLOXACIN 5 MG/ML
750 INJECTION, SOLUTION INTRAVENOUS
Status: DISCONTINUED | OUTPATIENT
Start: 2018-01-01 | End: 2018-01-01

## 2018-01-01 RX ORDER — ALBUMIN HUMAN 50 G/1000ML
25 SOLUTION INTRAVENOUS EVERY 6 HOURS
Status: DISCONTINUED | OUTPATIENT
Start: 2018-01-01 | End: 2018-01-01

## 2018-01-01 RX ORDER — INSULIN LISPRO 100 [IU]/ML
INJECTION, SOLUTION INTRAVENOUS; SUBCUTANEOUS ONCE
Status: COMPLETED | OUTPATIENT
Start: 2018-01-01 | End: 2018-01-01

## 2018-01-01 RX ORDER — DOCUSATE SODIUM 100 MG/1
100 CAPSULE, LIQUID FILLED ORAL
Status: DISCONTINUED | OUTPATIENT
Start: 2018-01-01 | End: 2018-01-01 | Stop reason: HOSPADM

## 2018-01-01 RX ORDER — GABAPENTIN 300 MG/1
900 CAPSULE ORAL
Status: DISCONTINUED | OUTPATIENT
Start: 2018-01-01 | End: 2018-01-01

## 2018-01-01 RX ORDER — SUCRALFATE 1 G/1
1 TABLET ORAL 4 TIMES DAILY
Status: DISCONTINUED | OUTPATIENT
Start: 2018-01-01 | End: 2018-01-01

## 2018-01-01 RX ORDER — NALBUPHINE HYDROCHLORIDE 10 MG/ML
5 INJECTION, SOLUTION INTRAMUSCULAR; INTRAVENOUS; SUBCUTANEOUS
Status: DISCONTINUED | OUTPATIENT
Start: 2018-01-01 | End: 2018-01-01 | Stop reason: HOSPADM

## 2018-01-01 RX ORDER — GENTAMICIN SULFATE 80 MG/100ML
80 INJECTION, SOLUTION INTRAVENOUS ONCE
Status: CANCELLED | OUTPATIENT
Start: 2018-01-01 | End: 2018-01-01

## 2018-01-01 RX ORDER — LOSARTAN POTASSIUM 50 MG/1
TABLET ORAL
Qty: 90 TAB | Refills: 1 | Status: SHIPPED | OUTPATIENT
Start: 2018-01-01

## 2018-01-01 RX ORDER — METFORMIN HYDROCHLORIDE 1000 MG/1
1000 TABLET ORAL 2 TIMES DAILY WITH MEALS
Qty: 60 TAB | Refills: 2 | Status: SHIPPED | OUTPATIENT
Start: 2018-01-01 | End: 2018-01-01 | Stop reason: DRUGHIGH

## 2018-01-01 RX ORDER — HYDROCHLOROTHIAZIDE 25 MG/1
TABLET ORAL
Qty: 90 TAB | Refills: 1 | Status: SHIPPED | OUTPATIENT
Start: 2018-01-01

## 2018-01-01 RX ORDER — UREA 10 %
1 LOTION (ML) TOPICAL
Status: DISCONTINUED | OUTPATIENT
Start: 2018-01-01 | End: 2018-01-01 | Stop reason: HOSPADM

## 2018-01-01 RX ADMIN — PHENYLEPHRINE HYDROCHLORIDE 250 MCG/MIN: 10 INJECTION, SOLUTION INTRAMUSCULAR; INTRAVENOUS; SUBCUTANEOUS at 10:00

## 2018-01-01 RX ADMIN — Medication 10 ML: at 00:09

## 2018-01-01 RX ADMIN — CIPROFLOXACIN 400 MG: 2 INJECTION, SOLUTION INTRAVENOUS at 00:34

## 2018-01-01 RX ADMIN — GLIPIZIDE 10 MG: 5 TABLET ORAL at 09:08

## 2018-01-01 RX ADMIN — INSULIN LISPRO 8 UNITS: 100 INJECTION, SOLUTION INTRAVENOUS; SUBCUTANEOUS at 00:06

## 2018-01-01 RX ADMIN — SUCRALFATE 1 G: 1 TABLET ORAL at 21:43

## 2018-01-01 RX ADMIN — FENTANYL CITRATE 50 MCG: 50 INJECTION, SOLUTION INTRAMUSCULAR; INTRAVENOUS at 10:36

## 2018-01-01 RX ADMIN — LORAZEPAM 0.5 MG: 0.5 TABLET ORAL at 22:01

## 2018-01-01 RX ADMIN — FENTANYL CITRATE 50 MCG: 50 INJECTION, SOLUTION INTRAMUSCULAR; INTRAVENOUS at 18:00

## 2018-01-01 RX ADMIN — AMIODARONE HYDROCHLORIDE 1 MG/MIN: 1.8 INJECTION, SOLUTION INTRAVENOUS at 05:00

## 2018-01-01 RX ADMIN — AMIODARONE HYDROCHLORIDE 150 MG: 1.5 INJECTION, SOLUTION INTRAVENOUS at 10:00

## 2018-01-01 RX ADMIN — LEVOTHYROXINE SODIUM 100 MCG: 100 TABLET ORAL at 08:38

## 2018-01-01 RX ADMIN — GADOBUTROL 10 ML: 604.72 INJECTION INTRAVENOUS at 17:00

## 2018-01-01 RX ADMIN — LORAZEPAM 2 MG: 2 INJECTION INTRAMUSCULAR; INTRAVENOUS at 05:55

## 2018-01-01 RX ADMIN — ACETAMINOPHEN 650 MG: 325 TABLET, FILM COATED ORAL at 15:27

## 2018-01-01 RX ADMIN — SUCRALFATE 1 G: 1 TABLET ORAL at 22:59

## 2018-01-01 RX ADMIN — MORPHINE SULFATE 2 MG: 2 INJECTION, SOLUTION INTRAMUSCULAR; INTRAVENOUS at 11:15

## 2018-01-01 RX ADMIN — ONDANSETRON 4 MG: 2 SOLUTION INTRAMUSCULAR; INTRAVENOUS at 18:25

## 2018-01-01 RX ADMIN — LEVOTHYROXINE SODIUM 100 MCG: 100 TABLET ORAL at 05:21

## 2018-01-01 RX ADMIN — SODIUM CHLORIDE 125 ML/HR: 900 INJECTION, SOLUTION INTRAVENOUS at 08:42

## 2018-01-01 RX ADMIN — SUCRALFATE 1 G: 1 SUSPENSION ORAL at 11:37

## 2018-01-01 RX ADMIN — ACETAMINOPHEN 650 MG: 325 TABLET, FILM COATED ORAL at 21:43

## 2018-01-01 RX ADMIN — INSULIN LISPRO 4 UNITS: 100 INJECTION, SOLUTION INTRAVENOUS; SUBCUTANEOUS at 17:05

## 2018-01-01 RX ADMIN — FAMOTIDINE 20 MG: 10 INJECTION, SOLUTION INTRAVENOUS at 02:19

## 2018-01-01 RX ADMIN — GABAPENTIN 900 MG: 300 CAPSULE ORAL at 21:43

## 2018-01-01 RX ADMIN — GABAPENTIN 900 MG: 300 CAPSULE ORAL at 22:59

## 2018-01-01 RX ADMIN — MORPHINE SULFATE 2 MG: 2 INJECTION, SOLUTION INTRAMUSCULAR; INTRAVENOUS at 13:38

## 2018-01-01 RX ADMIN — Medication 10 ML: at 21:44

## 2018-01-01 RX ADMIN — LORAZEPAM 2 MG: 2 INJECTION INTRAMUSCULAR; INTRAVENOUS at 12:06

## 2018-01-01 RX ADMIN — DOCUSATE SODIUM 100 MG: 100 CAPSULE, LIQUID FILLED ORAL at 16:33

## 2018-01-01 RX ADMIN — INSULIN LISPRO 2 UNITS: 100 INJECTION, SOLUTION INTRAVENOUS; SUBCUTANEOUS at 12:54

## 2018-01-01 RX ADMIN — DOCUSATE SODIUM 100 MG: 100 CAPSULE, LIQUID FILLED ORAL at 18:02

## 2018-01-01 RX ADMIN — KETOROLAC TROMETHAMINE 30 MG: 30 INJECTION, SOLUTION INTRAMUSCULAR at 15:27

## 2018-01-01 RX ADMIN — SODIUM CHLORIDE 125 ML/HR: 900 INJECTION, SOLUTION INTRAVENOUS at 10:09

## 2018-01-01 RX ADMIN — ONDANSETRON 4 MG: 2 INJECTION, SOLUTION INTRAMUSCULAR; INTRAVENOUS at 09:14

## 2018-01-01 RX ADMIN — LORAZEPAM 2 MG: 2 INJECTION INTRAMUSCULAR; INTRAVENOUS at 18:11

## 2018-01-01 RX ADMIN — FENTANYL CITRATE 25 MCG: 50 INJECTION, SOLUTION INTRAMUSCULAR; INTRAVENOUS at 17:30

## 2018-01-01 RX ADMIN — OXYCODONE HYDROCHLORIDE 5 MG: 5 TABLET ORAL at 09:16

## 2018-01-01 RX ADMIN — MORPHINE SULFATE 2 MG: 4 INJECTION INTRAVENOUS at 13:00

## 2018-01-01 RX ADMIN — LORAZEPAM 0.5 MG: 2 INJECTION, SOLUTION INTRAMUSCULAR; INTRAVENOUS at 11:00

## 2018-01-01 RX ADMIN — SODIUM CHLORIDE, SODIUM LACTATE, POTASSIUM CHLORIDE, AND CALCIUM CHLORIDE 50 ML/HR: 600; 310; 30; 20 INJECTION, SOLUTION INTRAVENOUS at 20:36

## 2018-01-01 RX ADMIN — AMIODARONE HYDROCHLORIDE 1 MG/MIN: 1.8 INJECTION, SOLUTION INTRAVENOUS at 11:23

## 2018-01-01 RX ADMIN — SODIUM CHLORIDE 125 ML/HR: 900 INJECTION, SOLUTION INTRAVENOUS at 17:00

## 2018-01-01 RX ADMIN — WATER: 1 INJECTION INTRAMUSCULAR; INTRAVENOUS; SUBCUTANEOUS at 18:57

## 2018-01-01 RX ADMIN — VASOPRESSIN 0.04 UNITS/MIN: 20 INJECTION INTRAVENOUS at 10:00

## 2018-01-01 RX ADMIN — MORPHINE SULFATE 2 MG: 2 INJECTION, SOLUTION INTRAMUSCULAR; INTRAVENOUS at 18:21

## 2018-01-01 RX ADMIN — AMLODIPINE BESYLATE 10 MG: 10 TABLET ORAL at 08:38

## 2018-01-01 RX ADMIN — SUCRALFATE 1 G: 1 TABLET ORAL at 14:57

## 2018-01-01 RX ADMIN — MIDAZOLAM HYDROCHLORIDE 1 MG: 1 INJECTION, SOLUTION INTRAMUSCULAR; INTRAVENOUS at 09:00

## 2018-01-01 RX ADMIN — Medication 10 ML: at 13:00

## 2018-01-01 RX ADMIN — POTASSIUM CHLORIDE 10 MEQ: 10 INJECTION, SOLUTION INTRAVENOUS at 08:06

## 2018-01-01 RX ADMIN — Medication 1 MG: at 22:33

## 2018-01-01 RX ADMIN — LEVOTHYROXINE SODIUM 100 MCG: 100 TABLET ORAL at 05:16

## 2018-01-01 RX ADMIN — ESCITALOPRAM OXALATE 20 MG: 20 TABLET ORAL at 08:49

## 2018-01-01 RX ADMIN — OXYCODONE HYDROCHLORIDE 5 MG: 5 TABLET ORAL at 21:43

## 2018-01-01 RX ADMIN — METOCLOPRAMIDE 5 MG: 5 INJECTION, SOLUTION INTRAMUSCULAR; INTRAVENOUS at 10:51

## 2018-01-01 RX ADMIN — SUCRALFATE 1 G: 1 TABLET ORAL at 16:32

## 2018-01-01 RX ADMIN — AMLODIPINE BESYLATE 10 MG: 10 TABLET ORAL at 08:25

## 2018-01-01 RX ADMIN — FUROSEMIDE 20 MG: 10 INJECTION INTRAMUSCULAR; INTRAVENOUS at 11:16

## 2018-01-01 RX ADMIN — SUCRALFATE 1 G: 1 TABLET ORAL at 18:02

## 2018-01-01 RX ADMIN — ACETAMINOPHEN 650 MG: 325 TABLET, FILM COATED ORAL at 08:49

## 2018-01-01 RX ADMIN — AMLODIPINE BESYLATE 10 MG: 10 TABLET ORAL at 08:34

## 2018-01-01 RX ADMIN — Medication 10 ML: at 06:44

## 2018-01-01 RX ADMIN — LORAZEPAM 0.5 MG: 0.5 TABLET ORAL at 06:46

## 2018-01-01 RX ADMIN — MORPHINE SULFATE 2 MG: 4 INJECTION INTRAVENOUS at 10:24

## 2018-01-01 RX ADMIN — CEFTRIAXONE 1 G: 1 INJECTION, POWDER, FOR SOLUTION INTRAMUSCULAR; INTRAVENOUS at 18:30

## 2018-01-01 RX ADMIN — AMIODARONE HYDROCHLORIDE 1 MG/MIN: 1.8 INJECTION, SOLUTION INTRAVENOUS at 22:35

## 2018-01-01 RX ADMIN — INSULIN LISPRO 2 UNITS: 100 INJECTION, SOLUTION INTRAVENOUS; SUBCUTANEOUS at 13:15

## 2018-01-01 RX ADMIN — LORAZEPAM 0.5 MG: 0.5 TABLET ORAL at 21:58

## 2018-01-01 RX ADMIN — OXYCODONE HYDROCHLORIDE 5 MG: 5 TABLET ORAL at 16:30

## 2018-01-01 RX ADMIN — ACETAMINOPHEN 650 MG: 325 TABLET, FILM COATED ORAL at 09:09

## 2018-01-01 RX ADMIN — MORPHINE SULFATE 4 MG: 4 INJECTION INTRAVENOUS at 15:53

## 2018-01-01 RX ADMIN — DOCUSATE SODIUM 100 MG: 100 CAPSULE, LIQUID FILLED ORAL at 09:08

## 2018-01-01 RX ADMIN — ONDANSETRON 4 MG: 2 INJECTION, SOLUTION INTRAMUSCULAR; INTRAVENOUS at 01:09

## 2018-01-01 RX ADMIN — SUCCINYLCHOLINE CHLORIDE 20 MG/ML INJECTION SOLUTION 40 MG: SOLUTION at 17:53

## 2018-01-01 RX ADMIN — ONDANSETRON 4 MG: 2 INJECTION, SOLUTION INTRAMUSCULAR; INTRAVENOUS at 08:32

## 2018-01-01 RX ADMIN — SODIUM CHLORIDE 125 ML/HR: 900 INJECTION, SOLUTION INTRAVENOUS at 17:04

## 2018-01-01 RX ADMIN — MORPHINE SULFATE 2 MG: 2 INJECTION, SOLUTION INTRAMUSCULAR; INTRAVENOUS at 21:45

## 2018-01-01 RX ADMIN — SODIUM CHLORIDE 1000 ML: 900 INJECTION, SOLUTION INTRAVENOUS at 17:57

## 2018-01-01 RX ADMIN — APIXABAN 2.5 MG: 2.5 TABLET, FILM COATED ORAL at 08:49

## 2018-01-01 RX ADMIN — OXYCODONE HYDROCHLORIDE 5 MG: 5 TABLET ORAL at 18:02

## 2018-01-01 RX ADMIN — APIXABAN 2.5 MG: 2.5 TABLET, FILM COATED ORAL at 18:02

## 2018-01-01 RX ADMIN — GENTAMICIN SULFATE 80 MG: 80 INJECTION, SOLUTION INTRAVENOUS at 10:25

## 2018-01-01 RX ADMIN — ESCITALOPRAM 10 MG: 5 TABLET, FILM COATED ORAL at 09:25

## 2018-01-01 RX ADMIN — AMIODARONE HYDROCHLORIDE 1 MG/MIN: 1.8 INJECTION, SOLUTION INTRAVENOUS at 07:15

## 2018-01-01 RX ADMIN — ESCITALOPRAM 10 MG: 5 TABLET, FILM COATED ORAL at 08:34

## 2018-01-01 RX ADMIN — HEPARIN SODIUM AND DEXTROSE 14 UNITS/KG/HR: 10000; 5 INJECTION INTRAVENOUS at 07:16

## 2018-01-01 RX ADMIN — FENTANYL CITRATE 25 MCG: 50 INJECTION INTRAMUSCULAR; INTRAVENOUS at 17:13

## 2018-01-01 RX ADMIN — Medication 10 ML: at 05:29

## 2018-01-01 RX ADMIN — MORPHINE SULFATE 2 MG: 2 INJECTION, SOLUTION INTRAMUSCULAR; INTRAVENOUS at 21:12

## 2018-01-01 RX ADMIN — SODIUM CHLORIDE 125 ML/HR: 900 INJECTION, SOLUTION INTRAVENOUS at 02:28

## 2018-01-01 RX ADMIN — INSULIN LISPRO 4 UNITS: 100 INJECTION, SOLUTION INTRAVENOUS; SUBCUTANEOUS at 13:00

## 2018-01-01 RX ADMIN — FENTANYL CITRATE 25 MCG: 50 INJECTION, SOLUTION INTRAMUSCULAR; INTRAVENOUS at 17:13

## 2018-01-01 RX ADMIN — LORAZEPAM 2 MG: 2 INJECTION INTRAMUSCULAR; INTRAVENOUS at 03:42

## 2018-01-01 RX ADMIN — LORAZEPAM 2 MG: 2 INJECTION INTRAMUSCULAR; INTRAVENOUS at 01:28

## 2018-01-01 RX ADMIN — PHENYLEPHRINE HYDROCHLORIDE 150 MCG/MIN: 10 INJECTION, SOLUTION INTRAMUSCULAR; INTRAVENOUS; SUBCUTANEOUS at 07:12

## 2018-01-01 RX ADMIN — FENTANYL CITRATE 50 MCG: 50 INJECTION, SOLUTION INTRAMUSCULAR; INTRAVENOUS at 11:14

## 2018-01-01 RX ADMIN — LORAZEPAM 0.5 MG: 2 INJECTION INTRAMUSCULAR at 11:00

## 2018-01-01 RX ADMIN — Medication 10 ML: at 14:57

## 2018-01-01 RX ADMIN — Medication 10 ML: at 21:38

## 2018-01-01 RX ADMIN — LORAZEPAM 2 MG: 2 INJECTION INTRAMUSCULAR; INTRAVENOUS at 19:21

## 2018-01-01 RX ADMIN — IOPAMIDOL 75 ML: 612 INJECTION, SOLUTION INTRAVENOUS at 09:22

## 2018-01-01 RX ADMIN — HEPARIN SODIUM AND DEXTROSE 18 UNITS/KG/HR: 10000; 5 INJECTION INTRAVENOUS at 12:00

## 2018-01-01 RX ADMIN — MORPHINE SULFATE 2 MG: 4 INJECTION INTRAVENOUS at 14:17

## 2018-01-01 RX ADMIN — WATER 2 G: 1 INJECTION INTRAMUSCULAR; INTRAVENOUS; SUBCUTANEOUS at 07:25

## 2018-01-01 RX ADMIN — FAMOTIDINE 20 MG: 10 INJECTION, SOLUTION INTRAVENOUS at 17:08

## 2018-01-01 RX ADMIN — SODIUM CHLORIDE 75 ML/HR: 900 INJECTION, SOLUTION INTRAVENOUS at 17:08

## 2018-01-01 RX ADMIN — INSULIN LISPRO 6 UNITS: 100 INJECTION, SOLUTION INTRAVENOUS; SUBCUTANEOUS at 06:35

## 2018-01-01 RX ADMIN — ACETAMINOPHEN 650 MG: 325 TABLET ORAL at 22:34

## 2018-01-01 RX ADMIN — Medication 10 ML: at 07:35

## 2018-01-01 RX ADMIN — LEVOTHYROXINE SODIUM 100 MCG: 100 TABLET ORAL at 08:02

## 2018-01-01 RX ADMIN — ACETAMINOPHEN 650 MG: 325 TABLET, FILM COATED ORAL at 02:59

## 2018-01-01 RX ADMIN — MORPHINE SULFATE 2 MG: 2 INJECTION, SOLUTION INTRAMUSCULAR; INTRAVENOUS at 09:06

## 2018-01-01 RX ADMIN — PROPOFOL 100 MG: 10 INJECTION, EMULSION INTRAVENOUS at 17:49

## 2018-01-01 RX ADMIN — LORAZEPAM 0.5 MG: 0.5 TABLET ORAL at 15:27

## 2018-01-01 RX ADMIN — POTASSIUM CHLORIDE 10 MEQ: 10 INJECTION, SOLUTION INTRAVENOUS at 10:09

## 2018-01-01 RX ADMIN — MORPHINE SULFATE 2 MG: 2 INJECTION, SOLUTION INTRAMUSCULAR; INTRAVENOUS at 03:42

## 2018-01-01 RX ADMIN — APIXABAN 2.5 MG: 2.5 TABLET, FILM COATED ORAL at 18:15

## 2018-01-01 RX ADMIN — LORAZEPAM 2 MG: 2 INJECTION INTRAMUSCULAR; INTRAVENOUS at 05:39

## 2018-01-01 RX ADMIN — MAGNESIUM SULFATE IN DEXTROSE 1 G: 10 INJECTION, SOLUTION INTRAVENOUS at 05:02

## 2018-01-01 RX ADMIN — SODIUM CHLORIDE 1000 ML: 900 INJECTION, SOLUTION INTRAVENOUS at 08:32

## 2018-01-01 RX ADMIN — ESCITALOPRAM 10 MG: 5 TABLET, FILM COATED ORAL at 08:25

## 2018-01-01 RX ADMIN — LEVOTHYROXINE SODIUM 100 MCG: 100 TABLET ORAL at 07:19

## 2018-01-01 RX ADMIN — SODIUM CHLORIDE 3.38 G: 900 INJECTION, SOLUTION INTRAVENOUS at 09:19

## 2018-01-01 RX ADMIN — ONDANSETRON 4 MG: 2 INJECTION INTRAMUSCULAR; INTRAVENOUS at 10:29

## 2018-01-01 RX ADMIN — MORPHINE SULFATE 2 MG: 2 INJECTION, SOLUTION INTRAMUSCULAR; INTRAVENOUS at 04:53

## 2018-01-01 RX ADMIN — SUCRALFATE 1 G: 1 TABLET ORAL at 09:07

## 2018-01-01 RX ADMIN — MORPHINE SULFATE 2 MG: 2 INJECTION, SOLUTION INTRAMUSCULAR; INTRAVENOUS at 21:35

## 2018-01-01 RX ADMIN — ACETAMINOPHEN 650 MG: 325 TABLET, FILM COATED ORAL at 20:17

## 2018-01-01 RX ADMIN — SODIUM CHLORIDE 125 ML/HR: 900 INJECTION, SOLUTION INTRAVENOUS at 22:03

## 2018-01-01 RX ADMIN — ESCITALOPRAM OXALATE 20 MG: 20 TABLET ORAL at 09:08

## 2018-01-01 RX ADMIN — GABAPENTIN 900 MG: 300 CAPSULE ORAL at 21:34

## 2018-01-01 RX ADMIN — DOCUSATE SODIUM 100 MG: 100 CAPSULE, LIQUID FILLED ORAL at 08:49

## 2018-01-01 RX ADMIN — LEVOTHYROXINE SODIUM 100 MCG: 100 TABLET ORAL at 05:57

## 2018-01-01 RX ADMIN — INSULIN LISPRO 2 UNITS: 100 INJECTION, SOLUTION INTRAVENOUS; SUBCUTANEOUS at 08:24

## 2018-01-01 RX ADMIN — INSULIN LISPRO 6 UNITS: 100 INJECTION, SOLUTION INTRAVENOUS; SUBCUTANEOUS at 17:09

## 2018-01-01 RX ADMIN — PROPOFOL 140 MG: 10 INJECTION, EMULSION INTRAVENOUS at 10:24

## 2018-01-01 RX ADMIN — MORPHINE SULFATE 2 MG: 2 INJECTION, SOLUTION INTRAMUSCULAR; INTRAVENOUS at 14:14

## 2018-01-01 RX ADMIN — SODIUM CHLORIDE 125 ML/HR: 900 INJECTION, SOLUTION INTRAVENOUS at 02:06

## 2018-01-01 RX ADMIN — AMIODARONE HYDROCHLORIDE 1 MG/MIN: 1.8 INJECTION, SOLUTION INTRAVENOUS at 10:00

## 2018-01-01 RX ADMIN — FENTANYL CITRATE 100 MCG: 50 INJECTION, SOLUTION INTRAMUSCULAR; INTRAVENOUS at 18:29

## 2018-01-01 RX ADMIN — MORPHINE SULFATE 2 MG: 2 INJECTION, SOLUTION INTRAMUSCULAR; INTRAVENOUS at 18:11

## 2018-01-01 RX ADMIN — FAMOTIDINE 20 MG: 10 INJECTION, SOLUTION INTRAVENOUS at 09:26

## 2018-01-01 RX ADMIN — SODIUM CHLORIDE, SODIUM LACTATE, POTASSIUM CHLORIDE, AND CALCIUM CHLORIDE 75 ML/HR: 600; 310; 30; 20 INJECTION, SOLUTION INTRAVENOUS at 09:26

## 2018-01-01 RX ADMIN — SODIUM CHLORIDE 2.25 G: 900 INJECTION, SOLUTION INTRAVENOUS at 01:13

## 2018-01-01 RX ADMIN — LIDOCAINE HYDROCHLORIDE 40 MG: 20 INJECTION, SOLUTION EPIDURAL; INFILTRATION; INTRACAUDAL; PERINEURAL at 10:24

## 2018-01-01 RX ADMIN — ALBUMIN (HUMAN) 25 G: 12.5 INJECTION, SOLUTION INTRAVENOUS at 02:19

## 2018-01-01 RX ADMIN — WATER 1 G: 1 INJECTION INTRAMUSCULAR; INTRAVENOUS; SUBCUTANEOUS at 17:00

## 2018-01-01 RX ADMIN — Medication 10 ML: at 13:51

## 2018-01-01 RX ADMIN — ACETAMINOPHEN 650 MG: 325 TABLET, FILM COATED ORAL at 14:57

## 2018-01-01 RX ADMIN — FENTANYL CITRATE 50 MCG: 50 INJECTION, SOLUTION INTRAMUSCULAR; INTRAVENOUS at 18:03

## 2018-01-01 RX ADMIN — MORPHINE SULFATE 2 MG: 2 INJECTION, SOLUTION INTRAMUSCULAR; INTRAVENOUS at 01:28

## 2018-01-01 RX ADMIN — MORPHINE SULFATE 2 MG: 2 INJECTION, SOLUTION INTRAMUSCULAR; INTRAVENOUS at 03:30

## 2018-01-01 RX ADMIN — ONDANSETRON 4 MG: 2 INJECTION INTRAMUSCULAR; INTRAVENOUS at 03:51

## 2018-01-01 RX ADMIN — SODIUM CHLORIDE 20 ML/HR: 900 INJECTION, SOLUTION INTRAVENOUS at 11:44

## 2018-01-01 RX ADMIN — SUCRALFATE 1 G: 1 TABLET ORAL at 21:34

## 2018-01-01 RX ADMIN — LORAZEPAM 0.5 MG: 0.5 TABLET ORAL at 12:01

## 2018-01-01 RX ADMIN — ONDANSETRON 4 MG: 2 INJECTION INTRAMUSCULAR; INTRAVENOUS at 10:08

## 2018-01-01 RX ADMIN — VANCOMYCIN HYDROCHLORIDE 1750 MG: 1 INJECTION, POWDER, LYOPHILIZED, FOR SOLUTION INTRAVENOUS at 11:37

## 2018-01-01 RX ADMIN — CIPROFLOXACIN 400 MG: 2 INJECTION, SOLUTION INTRAVENOUS at 13:02

## 2018-01-01 RX ADMIN — INSULIN LISPRO 8 UNITS: 100 INJECTION, SOLUTION INTRAVENOUS; SUBCUTANEOUS at 18:00

## 2018-01-01 RX ADMIN — CEFAZOLIN SODIUM 2 G: 2 SOLUTION INTRAVENOUS at 10:17

## 2018-01-01 RX ADMIN — MORPHINE SULFATE 2 MG: 2 INJECTION, SOLUTION INTRAMUSCULAR; INTRAVENOUS at 14:43

## 2018-01-01 RX ADMIN — LORAZEPAM 2 MG: 2 INJECTION INTRAMUSCULAR; INTRAVENOUS at 21:53

## 2018-01-01 RX ADMIN — ACETAMINOPHEN 650 MG: 325 TABLET, FILM COATED ORAL at 03:01

## 2018-01-01 RX ADMIN — WATER 2 G: 1 INJECTION INTRAMUSCULAR; INTRAVENOUS; SUBCUTANEOUS at 08:33

## 2018-01-01 RX ADMIN — SUCRALFATE 1 G: 1 TABLET ORAL at 08:48

## 2018-01-01 RX ADMIN — MORPHINE SULFATE 2 MG: 2 INJECTION, SOLUTION INTRAMUSCULAR; INTRAVENOUS at 05:55

## 2018-01-01 SDOH — SOCIAL STABILITY - SOCIAL INSECURITY: DISSAPEARANCE AND DEATH OF FAMILY MEMBER: Z63.4

## 2018-02-27 NOTE — TELEPHONE ENCOUNTER
Pt called in requesting refill of her   Requested Prescriptions     Pending Prescriptions Disp Refills    metFORMIN (GLUCOPHAGE) 1,000 mg tablet       Sig: Take 1 Tab by mouth two (2) times daily (with meals).    Audrey Lima

## 2018-03-19 PROBLEM — E11.40 TYPE 2 DIABETES MELLITUS WITH DIABETIC NEUROPATHY (HCC): Status: ACTIVE | Noted: 2018-01-01

## 2018-03-19 NOTE — PROGRESS NOTES
Chief Complaint   Patient presents with    Diabetes     1. Have you been to the ER, urgent care clinic since your last visit? Hospitalized since your last visit? No    2. Have you seen or consulted any other health care providers outside of the [address] [place] [address] since your last visit? Include any pap smears or colon screening.  No

## 2018-03-19 NOTE — PROGRESS NOTES
SUBJECTIVE:  [de-identified] y.o. female for follow up of diabetes. Diabetic Review of Systems - medication compliance: compliant all of the time, diabetic diet compliance: compliant most of the time, home glucose monitoring: is not performed, further diabetic     ROS: no polyuria or polydipsia, no chest pain, dyspnea or TIA's, no numbness, tingling or pain in extremities, last eye exam approximately overdue, acute symptoms are none. Other symptoms and concerns: patient crying as her 46year old daughter, Maynor Frazier,  from a massive MI last week. Patient discusses that she cannot believe that this has happened. She has support of her other two daughters locally. Patient reports that she has not been able to sleep properly and has been very anxious since she heard the news. Health maintenance reviewed  Due for osteoporosis screening declines at this time  Has seen Dr. Neely Heading 2018 will request consult notes  Due for mammogram declines at this time   patient also reports that the metformin 1000 mg po bid caused diarrhea so she has been taking 1/2 pill po bid and has been trying to eat ADA but admits since last week she has not really adhered. Lab Results   Component Value Date/Time    Hemoglobin A1c 8.3 (H) 2017 11:57 AM    Hemoglobin A1c (POC) 9.4 2018 11:00 AM     Lab Results   Component Value Date/Time    Cholesterol, total 201 (H) 2017 11:57 AM    Cholesterol (POC) 242 2012 10:34 AM    HDL Cholesterol 62 (H) 2017 11:57 AM    HDL Cholesterol (POC) 27 2012 10:34 AM    LDL, calculated 102.2 (H) 2017 11:57 AM    VLDL, calculated 36.8 2017 11:57 AM    Triglyceride 184 (H) 2017 11:57 AM    Triglycerides (POC) 246 2012 10:34 AM    CHOL/HDL Ratio 3.2 2017 11:57 AM       Current Outpatient Prescriptions   Medication Sig Dispense Refill    metFORMIN (GLUCOPHAGE) 1,000 mg tablet Take 1 Tab by mouth two (2) times daily (with meals).  60 Tab 2    losartan (COZAAR) 50 mg tablet TAKE 1 TABLET DAILY 90 Tab 1    hydroCHLOROthiazide (HYDRODIURIL) 25 mg tablet TAKE 1 TABLET DAILY 90 Tab 1    trimethoprim (TRIMPEX) 100 mg tablet Take 100 mg by mouth daily.  gabapentin (NEURONTIN) 300 mg capsule Take 3 Caps by mouth nightly. Indications: Restless Legs Syndrome 270 Cap 2    levothyroxine (SYNTHROID) 100 mcg tablet TAKE 1 TABLET DAILY BEFORE BREAKFAST 90 Tab 3    nitrofurantoin, macrocrystal-monohydrate, (MACROBID) 100 mg capsule Take 1 Cap by mouth two (2) times a day. 14 Cap 0    escitalopram oxalate (LEXAPRO) 10 mg tablet TAKE 1 TABLET DAILY 90 Tab 2    phenazopyridine (PYRIDIUM) 100 mg tablet Take 1 Tab by mouth three (3) times daily as needed for Pain. 12 Tab 0    BIOTIN PO Take  by mouth.  red yeast rice extract 600 mg cap Take 600 mg by mouth now.  CALCIUM CARBONATE (CALTRATE 600 PO) Take  by mouth.  LORazepam (ATIVAN) 0.5 mg tablet Take 1 Tab by mouth every eight (8) hours as needed for Anxiety. 60 Tab 0    aspirin delayed-release 81 mg tablet Take 81 mg by mouth daily. OBJECTIVE:  Awake and alert in no acute distress  Neck supple without lymphadenopathy, no carotid artery bruits auscultated bilaterally. No thyromegaly  Lungs clear throughout  S1 S2 RRR without ectopy or murmur auscultated.   Extremities: no clubbing, cyanosis, peripheral edema    Visit Vitals    /62 (BP 1 Location: Right arm, BP Patient Position: Sitting)    Pulse 67    Temp 97.3 °F (36.3 °C) (Oral)    Resp 20    Ht 5' 4\" (1.626 m)    Wt 172 lb (78 kg)    SpO2 95%    BMI 29.52 kg/m2       Diabetic foot exam:     Left: Reflexes 2+     Vibratory sensation normal    Proprioception normal   Sharp/dull discrimination normal    Filament test normal sensation with micro filament   Pulse DP: 2+ (normal)   Pulse PT: 2+ (normal)   Deformities: None  Right: Reflexes 2+   Vibratory sensation normal   Proprioception normal   Sharp/dull discrimination normal   Filament test diminished right plantar great toe and fifth toe   Pulse DP: 2+ (normal)   Pulse PT: 2+ (normal)   Deformities: None  Results for orders placed or performed in visit on 18   AMB POC HEMOGLOBIN A1C   Result Value Ref Range    Hemoglobin A1c (POC) 9.4 %       Diagnoses and all orders for this visit:    Controlled type 2 diabetes mellitus without complication, without long-term current use of insulin ([facility] [address]  -     AMB POC HEMOGLOBIN A1C  Patient decreased her metformin from 1000 mg bid to 1/2 pill (500 mg) po bid and loose stools resolved  Encouraged her to adhere to ADA diet  Patient verbalizes understanding. Bereavement reaction, daughter  last week from MI  -     LORazepam (ATIVAN) 0.5 mg tablet; Take 1 Tab by mouth every eight (8) hours as needed for Anxiety. Max Daily Amount: 1.5 mg., Print, Disp-30 Tab, R-0      Reviewed risks and benefits and common side effects of new medication  General comfort measures  Patient verbalizes understanding. Health maintenance reviewed  Patient declines at this time  I have discussed the diagnosis with the patient and the intended plan as seen in the above orders. The patient has received an after-visit summary and questions were answered concerning future plans. I have discussed medication side effects and warnings with the patient as well. Follow-up Disposition:  Return in about 3 months (around 2018), or if symptoms worsen or fail to improve, for dm type 2.

## 2018-03-19 NOTE — MR AVS SNAPSHOT
[facility] 
 
 
 [address] [address] [address] [address] 
[phone] Patient: Checo Knowles MRN:  [id number] Visit Information Date & Time Provider Department Dept. Phone Encounter #  
 3/19/2018 10:40 AM Mark Joy NP Ivanna Ott [address] [id number] Follow-up Instructions Return in about 3 months (around 2018), or if symptoms worsen or fail to improve, for dm type 2. Upcoming Health Maintenance Date Due  
 EYE EXAM RETINAL OR DILATED Q1 10/27/2016 BREAST CANCER SCRN MAMMOGRAM 2018 Influenza Age 5 to Adult 2018* MEDICARE YEARLY EXAM 2018 HEMOGLOBIN A1C Q6M 2018 MICROALBUMIN Q1 2018 LIPID PANEL Q1 2018 FOOT EXAM Q1 3/19/2019 GLAUCOMA SCREENING Q2Y 2019 DTaP/Tdap/Td series (2 - Td) 2027 *Topic was postponed. The date shown is not the original due date. Allergies as of 3/19/2018  Review Complete On: 3/19/2018 By: Mark Joy NP Severity Noted Reaction Type Reactions Phenergan [Promethazine] High 2014   Side Effect Other (comments) Made patient very hyper and extremely agitated Current Immunizations  Reviewed on 2016 Name Date Influenza Vaccine PF 2013 Influenza Vaccine Split 10/7/2011 Not reviewed this visit You Were Diagnosed With   
  
 Codes Comments Controlled type 2 diabetes mellitus without complication, without long-term current use of insulin ([facility]    -  Primary ICD-10-CM: E11.9 ICD-9-CM: 250.00 Bereavement reaction     ICD-10-CM: F43.20, Z63.4 ICD-9-CM: 309.0, V62.89 Vitals BP Pulse Temp Resp Height(growth percentile) Weight(growth percentile) 126/62 (BP 1 Location: Right arm, BP Patient Position: Sitting) 67 97.3 °F (36.3 °C) (Oral) 20 5' 4\" (1.626 m) 172 lb (78 kg) SpO2 BMI OB Status Smoking Status 95% 29.52 kg/m2 Postmenopausal Former Smoker BMI and BSA Data Body Mass Index Body Surface Area  
 29.52 kg/m 2 1.88 m 2 Preferred Pharmacy Pharmacy Name Phone [address] [facility] [phone] Your Updated Medication List  
  
   
This list is accurate as of 3/19/18 11:51 AM.  Always use your most recent med list.  
  
  
  
  
 aspirin delayed-release 81 mg tablet Take 81 mg by mouth daily. BIOTIN PO Take  by mouth. CALTRATE 600 PO Take  by mouth. * CYSTEX CRANBERRY 6,215-303 mg/15 mL Liqd Generic drug:  cran-vitC-mannose-FOS-bromeln Take 15 mL by mouth. * CYSTEX CRANBERRY PO Take 1 Cap by mouth daily as needed. escitalopram oxalate 10 mg tablet Commonly known as:  [place] TAKE 1 TABLET DAILY  
  
 gabapentin 300 mg capsule Commonly known as:  NEURONTIN Take 3 Caps by mouth nightly. Indications: Restless Legs Syndrome  
  
 hydroCHLOROthiazide 25 mg tablet Commonly known as:  HYDRODIURIL  
TAKE 1 TABLET DAILY  
  
 levothyroxine 100 mcg tablet Commonly known as:  SYNTHROID  
TAKE 1 TABLET DAILY BEFORE BREAKFAST LORazepam 0.5 mg tablet Commonly known as:  ATIVAN Take 1 Tab by mouth every eight (8) hours as needed for Anxiety. Max Daily Amount: 1.5 mg.  
  
 losartan 50 mg tablet Commonly known as:  COZAAR  
TAKE 1 TABLET DAILY  
  
 metFORMIN 500 mg tablet Commonly known as:  GLUCOPHAGE Take 500 mg by mouth two (2) times daily (with meals). nitrofurantoin (macrocrystal-monohydrate) 100 mg capsule Commonly known as:  MACROBID Take 1 Cap by mouth two (2) times a day. red yeast rice extract 600 mg Cap Take 600 mg by mouth now. * Notice: This list has 2 medication(s) that are the same as other medications prescribed for you. Read the directions carefully, and ask your doctor or other care provider to review them with you. Prescriptions Printed Refills LORazepam (ATIVAN) 0.5 mg tablet 0 Sig: Take 1 Tab by mouth every eight (8) hours as needed for Anxiety. Max Daily Amount: 1.5 mg.  
 Class: Print Route: Oral  
  
We Performed the Following AMB POC HEMOGLOBIN A1C [26740 CPT(R)] Follow-up Instructions Return in about 3 months (around 6/19/2018), or if symptoms worsen or fail to improve, for dm type 2. Introducing [facility]! Dear Hesham Zavala: 
Thank you for requesting a [employer] account. Our records indicate that you have previously registered for a [employer] account but its currently inactive. Please call our [employer] support line at [phone]. Additional Information If you have questions, please visit the Frequently Asked Questions section of the [employer] website at [email or web address] Remember, [employer] is NOT to be used for urgent needs. For medical emergencies, dial 911. Now available from your iPhone and Android! Please provide this summary of care documentation to your next provider. Your primary care clinician is listed as Debra Ha. If you have any questions after today's visit, please call [phone].

## 2018-04-29 NOTE — ED NOTES
The patient's daughter Onel Avila) was not in agreement with plan to discharge patient home. She is currently out of town and is concerned about the patient going home alone. She requested that case management be consulted. Ms. Darvin Uribe may be reached at [phone]. This RN spoke with case management, Mica Marvin, who was also in agreement that the patient does not meet criteria for admission, nor are there any resources available overnight until the patient's daughter returns from out of town in the morning. The patient's granddaughter-in-law is currently at the patient's bedside. Dr. Kourtney Wan will consult with Hospitalist to ensure best treatment plan for patient.

## 2018-04-29 NOTE — ED TRIAGE NOTES
The patient presents for evaluation of nausea that began Friday. Denies vomiting or diarrhea. Unsure of last BM.

## 2018-04-29 NOTE — PROGRESS NOTES
ON-CALL SW NOTE: SW received call from ED. Pt came into the ED and was treated for UTI. Pt's daughter is out of town and verbalized concern related to her mother's confusion. The daughter was provided education by the medical staff about sometimes confusion is common in pt's who have UTI. Pt does not meet any criteria for hospitalization. ED staff will communicate with the family that there is no justification not to release the pt to go back home.     HARESH Mendieta LSW  [phone] (pager)

## 2018-04-29 NOTE — DISCHARGE INSTRUCTIONS

## 2018-04-29 NOTE — ED PROVIDER NOTES
EMERGENCY DEPARTMENT HISTORY AND PHYSICAL EXAM    5:50 PM      Date: 4/29/2018  Patient Name: Clotilde Dumont    History of Presenting Illness     Chief Complaint   Patient presents with    Nausea         History Provided By: Patient    Chief Complaint: nausea  Duration: 3 Days  Timing:  Constant  Location: abdomen  Quality: without vomiting  Severity: Moderate  Modifying Factors: none reported  Associated Symptoms: she also notes some SOB      Additional History (Context): Clotilde Dumont is a [de-identified] y.o. female with diabetes, hypertension and hyperlipidemia who presents with nausea without vomiting and shortness of breath for the past 3 days. She denies any diarrhea, chest pain or abdominal pain. She denies any known sick contacts. No other symptoms or concerns were expressed. She reports that her pupils are chronically unequal.       PCP: Gricel Vergara NP    Current Facility-Administered Medications   Medication Dose Route Frequency Provider Last Rate Last Dose    cefTRIAXone (ROCEPHIN) injection 1 g  1 g IntraVENous NOW Alen Oscar,         sterile water (preservative free) injection              Current Outpatient Prescriptions   Medication Sig Dispense Refill    trimethoprim-sulfamethoxazole (BACTRIM DS) 160-800 mg per tablet Take 1 Tab by mouth two (2) times a day for 3 days. 6 Tab 0    losartan (COZAAR) 50 mg tablet TAKE 1 TABLET DAILY 90 Tab 1    hydroCHLOROthiazide (HYDRODIURIL) 25 mg tablet TAKE 1 TABLET DAILY 90 Tab 1    escitalopram oxalate (LEXAPRO) 10 mg tablet TAKE 1 TABLET DAILY 90 Tab 2    metFORMIN (GLUCOPHAGE) 500 mg tablet Take 500 mg by mouth two (2) times daily (with meals).  cran-vitC-mannose-FOS-bromeln (CYSTEX CRANBERRY) 8,625-708 mg/15 mL liqd Take 15 mL by mouth.  CRAN/VITC/MANNOSE/FOS/BROMELN (CYSTEX CRANBERRY PO) Take 1 Cap by mouth daily as needed.  LORazepam (ATIVAN) 0.5 mg tablet Take 1 Tab by mouth every eight (8) hours as needed for Anxiety.  Max Daily Amount: 1.5 mg. 30 Tab 0    gabapentin (NEURONTIN) 300 mg capsule Take 3 Caps by mouth nightly. Indications: Restless Legs Syndrome 270 Cap 2    levothyroxine (SYNTHROID) 100 mcg tablet TAKE 1 TABLET DAILY BEFORE BREAKFAST 90 Tab 3    nitrofurantoin, macrocrystal-monohydrate, (MACROBID) 100 mg capsule Take 1 Cap by mouth two (2) times a day. 14 Cap 0    BIOTIN PO Take  by mouth.  red yeast rice extract 600 mg cap Take 600 mg by mouth now.  CALCIUM CARBONATE (CALTRATE 600 PO) Take  by mouth.  aspirin delayed-release 81 mg tablet Take 81 mg by mouth daily. Past History     Past Medical History:  Past Medical History:   Diagnosis Date    Allergies     Diabetes mellitus ([facility] 1/11/2010    Hearing loss 1/11/2010    HTN (hypertension) 1/11/2010    Hypercholesteremia 1/11/2010    Hypothyroidism 1/11/2010    Macular degeneration     shanae Art     Left femural neck    Otosclerosis     Left ear    Panic attack     Thyroid disease     hypothyroid    Unspecified adverse effect of anesthesia     hard to wake up in the past. Had own blood transfusion and was better       Past Surgical History:  Past Surgical History:   Procedure Laterality Date    HX HEENT      Left stapedectomy 10/03    HX HEENT  7/2012    eye lids lifted Dr. Sy Felton HX MALIGNANT SKIN Pending sale to [facility]  07/26/2013    Upper arm basal cell skin cancer removal    HX ORTHOPAEDIC      Right hip replacement 2004       Family History:  Family History   Problem Relation Age of Onset    Breast Cancer Sister 70    Heart Attack Father     Heart Attack Daughter 52     March 2018       Social History:  Social History   Substance Use Topics    Smoking status: Former Smoker     Packs/day: 1.00     Years: 20.00     Types: Cigarettes     Quit date: 8/27/1995    Smokeless tobacco: Never Used    Alcohol use No       Allergies:   Allergies   Allergen Reactions    Phenergan [Promethazine] Other (comments)     Made patient very hyper and extremely agitated           Review of Systems       Review of Systems   Constitutional: Negative. Negative for chills, diaphoresis and fever. HENT: Negative. Negative for congestion, rhinorrhea and sore throat. Eyes: Negative. Negative for pain, discharge and redness. Respiratory: Positive for shortness of breath. Negative for cough, chest tightness and wheezing. Cardiovascular: Negative. Negative for chest pain. Gastrointestinal: Positive for nausea. Negative for abdominal pain, constipation, diarrhea and vomiting. Genitourinary: Negative. Negative for dysuria, flank pain, frequency, hematuria and urgency. Musculoskeletal: Negative. Negative for back pain and neck pain. Skin: Negative. Negative for rash. Neurological: Negative. Negative for syncope, weakness, numbness and headaches. Psychiatric/Behavioral: Negative. All other systems reviewed and are negative. Physical Exam     Visit Vitals    /79 (BP 1 Location: Left arm, BP Patient Position: At rest)    Pulse 80    Temp 98.1 °F (36.7 °C)    Resp 20    Ht 5' 4\" (1.626 m)    Wt 77.1 kg (170 lb)    SpO2 97%    BMI 29.18 kg/m2         Physical Exam   Constitutional: She is oriented to person, place, and time. She appears well-developed and well-nourished. She appears lethargic. She is cooperative. Non-toxic appearance. She does not have a sickly appearance. She does not appear ill. No distress. HENT:   Head: Normocephalic and atraumatic. Mouth/Throat: Oropharynx is clear and moist. No oropharyngeal exudate. Eyes: Conjunctivae and EOM are normal. Pupils are equal, round, and reactive to light. No scleral icterus. Neck: Trachea normal and normal range of motion. Neck supple. No hepatojugular reflux and no JVD present. No tracheal deviation present. No thyromegaly present.    Cardiovascular: Regular rhythm, S1 normal, S2 normal, normal heart sounds, intact distal pulses and normal pulses. Tachycardia present. Exam reveals no gallop, no S3 and no S4. No murmur heard. Pulses:       Radial pulses are 2+ on the right side, and 2+ on the left side. Dorsalis pedis pulses are 2+ on the right side, and 2+ on the left side. Pulmonary/Chest: Effort normal and breath sounds normal. No accessory muscle usage. No respiratory distress. She has no decreased breath sounds. She has no wheezes. She has no rhonchi. She has no rales. Speaking in short sentences   Abdominal: Soft. Normal appearance and bowel sounds are normal. She exhibits no distension and no mass. There is no hepatosplenomegaly. There is no tenderness. There is no rigidity, no rebound, no guarding, no CVA tenderness, no tenderness at McBurney's point and negative Lange's sign. Musculoskeletal: Normal range of motion. She exhibits no edema. Strength 3/5 throughout   Lymphadenopathy:        Head (right side): No submental, no submandibular, no preauricular and no occipital adenopathy present. Head (left side): No submental, no submandibular, no preauricular and no occipital adenopathy present. She has no cervical adenopathy. Right: No supraclavicular adenopathy present. Left: No supraclavicular adenopathy present. Neurological: She is oriented to person, place, and time. She has normal strength and normal reflexes. She appears lethargic. She is not disoriented. No cranial nerve deficit or sensory deficit. Coordination and gait normal. GCS eye subscore is 4. GCS verbal subscore is 5. GCS motor subscore is 6. Grossly intact    Skin: Skin is warm, dry and intact. No rash noted. She is not diaphoretic. Psychiatric: She has a normal mood and affect. Her speech is normal and behavior is normal. Judgment and thought content normal. Cognition and memory are normal.   Nursing note and vitals reviewed.         Diagnostic Study Results     Labs -  Recent Results (from the past 12 hour(s))   CBC WITH AUTOMATED DIFF    Collection Time: 04/29/18  5:45 PM   Result Value Ref Range    WBC 6.4 4.6 - 13.2 K/uL    RBC 4.46 4.20 - 5.30 M/uL    HGB 13.2 12.0 - 16.0 g/dL    HCT 37.9 35.0 - 45.0 %    MCV 85.0 74.0 - 97.0 FL    MCH 29.6 24.0 - 34.0 PG    MCHC 34.8 31.0 - 37.0 g/dL    RDW 14.2 11.6 - 14.5 %    PLATELET 069 361 - 234 K/uL    MPV 9.5 9.2 - 11.8 FL    NEUTROPHILS 65 40 - 73 %    LYMPHOCYTES 20 (L) 21 - 52 %    MONOCYTES 15 (H) 3 - 10 %    EOSINOPHILS 0 0 - 5 %    BASOPHILS 0 0 - 2 %    ABS. NEUTROPHILS 4.2 1.8 - 8.0 K/UL    ABS. LYMPHOCYTES 1.3 0.9 - 3.6 K/UL    ABS. MONOCYTES 0.9 0.05 - 1.2 K/UL    ABS. EOSINOPHILS 0.0 0.0 - 0.4 K/UL    ABS. BASOPHILS 0.0 0.0 - 0.06 K/UL    DF AUTOMATED     METABOLIC PANEL, COMPREHENSIVE    Collection Time: 04/29/18  5:45 PM   Result Value Ref Range    Sodium 136 136 - 145 mmol/L    Potassium 3.4 (L) 3.5 - 5.5 mmol/L    Chloride 101 100 - 108 mmol/L    CO2 26 21 - 32 mmol/L    Anion gap 9 3.0 - 18 mmol/L    Glucose 191 (H) 74 - 99 mg/dL    BUN 14 7.0 - 18 MG/DL    Creatinine 1.14 0.6 - 1.3 MG/DL    BUN/Creatinine ratio 12 12 - 20      GFR est AA 56 (L) >60 ml/min/1.73m2    GFR est non-AA 46 (L) >60 ml/min/1.73m2    Calcium 8.6 8.5 - 10.1 MG/DL    Bilirubin, total 0.7 0.2 - 1.0 MG/DL    ALT (SGPT) 17 13 - 56 U/L    AST (SGOT) 9 (L) 15 - 37 U/L    Alk.  phosphatase 88 45 - 117 U/L    Protein, total 8.3 (H) 6.4 - 8.2 g/dL    Albumin 3.6 3.4 - 5.0 g/dL    Globulin 4.7 (H) 2.0 - 4.0 g/dL    A-G Ratio 0.8 0.8 - 1.7     CARDIAC PANEL,(CK, CKMB & TROPONIN)    Collection Time: 04/29/18  5:45 PM   Result Value Ref Range    CK 47 26 - 192 U/L    CK - MB <1.0 <3.6 ng/ml    CK-MB Index  0.0 - 4.0 %     CALCULATION NOT PERFORMED WHEN RESULT IS BELOW LINEAR LIMIT    Troponin-I, Qt. <0.02 0.0 - 0.045 NG/ML   EKG, 12 LEAD, INITIAL    Collection Time: 04/29/18  6:03 PM   Result Value Ref Range    Ventricular Rate 79 BPM    Atrial Rate 79 BPM    P-R Interval 146 ms    QRS Duration 72 ms    Q-T Interval 390 ms    QTC Calculation (Bezet) 447 ms    Calculated P Axis 103 degrees    Calculated R Axis -18 degrees    Calculated T Axis 54 degrees    Diagnosis       Normal sinus rhythm  Low voltage QRS    Abnormal ECG  When compared with ECG of 11-NOV-2016 08:27,  No significant change was found    Confirmed by German Seals ([id number]) on 4/29/2018 6:25:39 PM     URINALYSIS W/ RFLX MICROSCOPIC    Collection Time: 04/29/18  6:19 PM   Result Value Ref Range    Color YELLOW      Appearance TURBID      Specific gravity 1.009 1.005 - 1.030      pH (UA) 5.5 5.0 - 8.0      Protein 30 (A) NEG mg/dL    Glucose NEGATIVE  NEG mg/dL    Ketone NEGATIVE  NEG mg/dL    Bilirubin NEGATIVE  NEG      Blood MODERATE (A) NEG      Urobilinogen 0.2 0.2 - 1.0 EU/dL    Nitrites POSITIVE (A) NEG      Leukocyte Esterase LARGE (A) NEG         Radiologic Studies -   XR CHEST PORT    (Results Pending)         Medical Decision Making   I am the first provider for this patient. I reviewed the vital signs, available nursing notes, past medical history, past surgical history, family history and social history. Vital Signs-Reviewed the patient's vital signs. Records Reviewed: Nursing Notes (Time of Review: 5:50 PM)    ED Course: Progress Notes, Reevaluation, and Consults:    Patient presenting with nausea and no other complains. I feel this is likely UTI or a cardiac event since the only presenting symptoms is nausea. My priority will be focused on cardiac and infective process. Labs essentially normal cardiac. Leukocytes and lymphocytes in UA. Chest X-Ray showed No acute process. EKG showed NSR with rate of 79 bpm. With no ST elevations or depression and non specific T wave changes.    6:52 PM 4/29/2018    Provider Notes (Medical Decision Making):  MDM  Number of Diagnoses or Management Options  Acute cystitis without hematuria:   Diagnosis management comments: DIFFERENTIAL DIAGNOSES/ MEDICAL DECISION MAKING:  DDX: Gastritis, gerd, peptic ulcer disease, cholecystitis, pancreatitis, gastroenteritis, hepatitis, constipation related pain, appendicitis pain, diverticulitis, urinary tract infection, obstruction, abdominal wall pain, atypical cardiac (ami or anginal pain), referred pain from pulmonary process (pneumonia, empyema),  or combination of the above versus many other processes. Chest pain etiologies include acute cardiac events to include possible acute myocardial infarction, acute coronary syndrome, pneumonia, chest wall pain (myofascial/ musculoskeletal etiology), chronic obstructive pulmonary disease (copd), acute asthma exacerbation, congestive heart failure, acute bronchitis, pulmonary embolism, upper respiratory infection, referred abdominal pain, other etiologies, versus combination of the above.         7:03 PM  Plan for discharge was discussed with grand-daughter, who agrees with plan. Diagnosis       I have reassessed the patient. Patient is feeling much better and respiration rate has decreased and speaking in normal sentences. Patient was discharged in stable condition. Patient is to return to emergency department if any new or worsening condition. Clinical Impression:   1.  Acute cystitis without hematuria        Disposition: Discharge    Follow-up Information     Follow up With Details Comments [facility] [place], NP Call in 2 days ED visit follow-up [address] [place] [id number] [phone]      [facility] EMERGENCY DEPT Go to As needed, If symptoms worsen [address]  [phone]           _______________________________    Attestations:  [facility] acting as a scribe for and in the presence of Negra Quiroz,       April 29, 2018 at 5:50 PM       Provider Attestation:      I personally performed the services described in the documentation, reviewed the documentation, as recorded by the scribe in my presence, and it accurately and completely records my words and actions. April 29, 2018 at 5:50 PM - Bossmanyadira Oscar, DO    _______________________________    After discharge papers were printed, the patient's grand-daughter brought me her phone to speak with the patient's daughter. Findings and discharge plans were discussed. Patient's daughter then stated that her  was on the phone and that she did not agree with the discharge plan. I told the daughter that her mother did not meet admission criteria at this time and that the hospitalist would not likely admit since the patient did not meet admission criteria. We discussed that the patient's condition had improved. The patient was speaking in normal sentences and the nausea had resolved. The UTI diagnosis explained her nausea, sob and any mental status change the patient might of had prior to arrival.    The patient's daughter wanted to speak to case management at which time I turned it over to the charge nurse who addressed the issue. Consult:  Discussed care with Dr Kliey Pinzon, Hospitalist.  Standard discussion; including history of patients chief complaint, available diagnostic results, and treatment course. He agrees with patient not meeting any criteria for admission. And is in agreement with discharge plan.      7:35 PM

## 2018-04-30 NOTE — ED NOTES
Granddaughter-in-law blocking patients door telling RN she is not allowed to discharge patient until everything is settled. Yanet Avelar is talking to someone on the phone who says she is the patients POA. I approach the room, and asked is everything alright. The granddaughter states she is not getting discharged yet, I said I thought everything was settled. Ashley Sanabria then began yelling at me in the hallway, and pointing inches away from my face \"you better not fucking touch her, don't lay a hand on her\". Ashley Sanabria is speaking on the phone and then gets close to my badge and says to the person on the phone \"this woman is a piece of work Nan Zaragoza RN\". I think asked the unit secretary to call [facility] department d/t his aggressive behavior.

## 2018-04-30 NOTE — PROGRESS NOTES
Was called to review labs and images in chart. Was not asked to see this patient. Was asked if presentation from chart review met requirements for admission. While information given to me was only from chart review, I do not see reason for admission. All other work up and decisions were per Dr. Jose Toro as I was not asked to come see this patient and it was determined, prior to contacting me, that this patient was already ok for discharge from the ED. Again, I had no involvement in this case other than a phone call asking me to evaluate labs from Dr. Jose Toro.      Nehemiah Dakin, MD  [facility]   Internal Medicine  Hospitalist Division  [phone]

## 2018-04-30 NOTE — ED NOTES
Nursing supervisor spoke with pt's neighbor, and she said \"yes I have a key to her apartment\". Nursing supervisor enters patient room to inform patient and her family.

## 2018-04-30 NOTE — ED NOTES
Dr. Kleber Echols RN, and myself at pt's bedside to discuss discharge plan with pt and granddaughter-in-law. Per granddaughter-in-law pt is too weak to get up and ambulate. Pt states she walks with a cane so a cane was given to the patient to assess mobility, and any gait disturbance. Pt ambulated from room to bathroom with the use of the cane, no assistance needed by nurse. Pt denies any SOB, chest pain, or any other complications. Pt states to MD that she feels ok. Pt placed back into bed onto a monitor, side rails up, no distress noted at this time.

## 2018-04-30 NOTE — ED NOTES
Preparing to discharge patient family instructed RN that \" you will not touch her she is not leaving\" Charge RN informed.

## 2018-04-30 NOTE — ED NOTES
Pt's granddaughter-in-law asked me know the patient needs to go to the bathroom. Pt ambulates to the bathroom with cane without any complaints. Pt returned to stretcher and placed on monitor with side rails up. The patient then asked me if I have found Nadine, I said no not yet. I asked the patient what is MsMarielos Mccoy's last name she said Nikki, and her son's name is Joshua Ann.

## 2018-04-30 NOTE — ED NOTES
Pt's neighbor calls nursing supervisor Yomaira Mendes to inform her that her son will be bring the key to [facility] ED. Pt and family notified.

## 2018-04-30 NOTE — ED NOTES
Assisted patient to bathroom. Pt utilized cane to walk. Steady on feet at this time. Pt returned to stretcher.   Blankets placed on patient

## 2018-04-30 NOTE — ED NOTES
A man enters the room and addresses himself as Joyce Hastings a  and friend of the patients son-in-law and daughter. Mr. Karine Joel then asked Dedrick Hang was the patient being discharged and who does he need to speak to in order to stop the discharge? \"  Mr. Karine Joel was then informed he would need consent from the patient in order to receive any information medical information. Mr. Karine Joel then said \"the patient can give verbal consent, and then asked the patient if it was alright to discuss her medical care\", patient said yes. I then restated to the patient \"so I'm clear are you giving Mr. Ann full disclosure to your medical history? \"  The patient said yes it is ok. Mr. Karine Joel then asked if I could please get someone to discuss stopping the patient from being discharge. MD notified at this time.

## 2018-04-30 NOTE — ED NOTES
Nursing supervisors Radha Mckee, and myself at bedside, speaking with patient, Mr. Rosie Ferguson patients grandson,and granddaughter-in-law about patients discharge plan. Nursing Supervisor asked Inocente White will be escorting patient home tonight? \"  Mr. Karine Joel states\" patient will not be going home tonight. \"  Nursing supervisor then address patient who is alert and oriented to person, time, place, and situation, \"do you given consent to discuss your medical care in front of all who is present in the room. \"  Patient states \"yes it ok\". Nursing supervisor then asked the patient if she was in any pain or had any complaints. The patient responded \"no\". The nursing supervisor then asked the patient if she felt good enough to go home. The patient said \"yes\". The grandson Bobbi Dias) said \"but she is confused, and she does not have a key to get into her apartment, and that he wants the patient to have a psyc evaluation\". Nursing supervisor then asked the patient \" Ms. Sarah Katz do you feel like you want to hurt yourself? \" the patient said \"no\". The nursing supervisor then asked Ms. Sarah Katz do you feel like you want to hurt someone else? \" the patient said \"no\". The nursing then proceeded to explain that the doctor had discharged the patient because there is no medical reason to admit the patient at this time, and because the patient had verbally denies SI/HI, and did not have a hx of mental illness she did not meet the critera for a psychiatric evaluation. The grandson then said \" he wants a psyc eval done on the patient,and that she will not leave without having one. \"  The patient then stated \" she wants to go home\". The grandson said \"but you don't have a key to get in\". The grandson then began speaking loudly in an aggressive tone stating \"his alexi will not be going home tonight, and she will receive a psych eval\". The patient then said \"my neighbor Nadine has a busby to my house\".   The patient then turned to me and said \"please call call Dot for me\", and then gave me two phone numbers to call. Mr. Nitin Hi then asked to speak to the doctor, so the nursing supervisors and myself existed the room to give Isa Carrizales a chance to calm down because he had become very upset,and was yelling aggressively that \"the patient will not be leaving this hospital tonight. \"

## 2018-04-30 NOTE — ED NOTES
I have reviewed discharge instructions with the patient. The patient verbalized understanding. I have reviewed the provider's instructions with the patient, answering all questions to her satisfaction. Discharge medications reviewed with patient and appropriate educational materials and side effects teaching were provided. Pt escorted via [facility] transportation back to home.

## 2018-04-30 NOTE — ED NOTES
Dr. Lubna Anderson, nursing supervisors and myself returned to patients room as requested by Mr. Shayy Hough pt's grandson told Dr. Lubna Anderson he wanted the patient to have a psych eval.  Dr. Lubna Anderson spoke with patient, pt denied any SI/HI. MD then explained patient does not meet protocol. Moriah Wylie then said \"pt does not have to suicidal or homicidal to have a psych eval\". MD states \"but pt does not have a hx of mental illness, and she does present as a threat to herself\". Moriah Wylie then began yelling Hermila Martell is she going to get into her house, she doesn't have any keys, my mother has keys to her house and she is out of town\". The patient then asked me if I was able to contact her neighbor Nadine, I said no she did not answer. The patient then said Betty Fitzgerald it's Sunday, Nadine has dinner with her son on Sundays, and she doesn't get back until late. \"  Moriah Wylie then said Nadine does not have keys to her apartment because Dot is crazy as they come while holding is finger to head making a circular motion. \"  The patient said \"please try calling her again, I just want to go home so all this can stop, Roque please be nice, Moriah Wylie please be nice\" as she held his hand. The nursing supervisor then said to Moriah Wylie it seems like you care deeply for your grandmother, Moriah Wylie said yes, nursing supervisor said then is it possible that you or your wife can stay the night with the patient, or can she stay at your house. Moriah Wylie then looks and started yelling at me  \" Ms. Jasmin Goetz if you can get the patient into her apartment then I will take care of the rest because this is a family issue\". We existed the room to gather information from EMS, and continue to call the patients friend Nadine.

## 2018-04-30 NOTE — ED NOTES
Patient's grandson Darrick Terry is speaking aggressively to Cecille, saying the patient is not going home and he is not to touch her. [facility] medic places a call to his supervisor.

## 2018-05-02 NOTE — Clinical Note
Just an FYI, she's scheduled with you 5/9/18 at 10:20am for ED follow-up. She went to [facility] ED 4/29/18 for nausea x 3 days, SOB, and UTI. She also reported confusion during event.

## 2018-05-02 NOTE — PROGRESS NOTES
ED Discharge Follow-Up    Date/Time:  2018 1:22 PM    Patient was admitted to [facility] ED on 18 and discharged on 18 for nausea x 3 days, shortness of breath, and UTI. Presenting symptoms: She states that she was confused, short of breath, and had nausea without vomiting. Denied pain. Nurse Navigator(NN) contacted the patient  by telephone to perform post ED discharge assessment. Verified name and  with patient as identifiers. Provided introduction to self, and explanation of the Nurse Navigator role. Patient contacted within 3 business day(s) of discharge. Subjective data: She states \"I'm very weak\" and \"feel very sick. \" She states that she has felt this way since 18 when she went to ED. When asked what she meant by \"very sick\" she stated that she just doesn't feel like her normal self. She states that she is taking her bactrim. Pertinent negatives: pain, nausea, vomiting, diarrhea, SOB, edema, cough or sputum. No HTN related symptoms, or hyper/hypoglycemia symptoms were identified. Medication:   New medications at discharge include: Bactrim 160-800mg po twice daily for 3 days  Taking medication(s) prescribed at discharge: yes  There were no barriers to obtaining medications identified at this time. Does the patient have new prescription(s) to last until follow up with prescribing provider: yes  Pharmacy consult for polypharm needed?: no   Medication changes (dose adjustments or discontinued meds): none    Reviewed discharge instructions and red flags with  patient who provided teach back. Patient given an opportunity to ask questions and does not have any further questions or concerns at this time. The patient agrees to contact the PCP office for questions related to their healthcare.      Patient reminded that there are physicians on call 24 hours a day / 7 days a week (M-F 5pm to 8am and from Friday 5pm until Monday 8a for the weekend) should the patient have questions or concerns. NN provided contact information for future reference. Offered follow up appointment with PCP: no, she was already scheduled     Future Appointments  Date Time Provider Lisha Osborne   5/9/2018 10:20 AM NANNETTE Cifuentes Attends follow-up appointments as directed. Patient aware of appt.  ADEBAYO/ Schimming NP 5/9/18 at 10:20am.

## 2018-05-09 NOTE — PROGRESS NOTES
Goals Addressed             Most Recent     Attends follow-up appointments as directed. On track (5/9/2018)             Appt. W/ Dom BRUNSON 5/9/18 at 10:20am.          Patient attended ED follow-up appt. With PCP.

## 2018-05-09 NOTE — ACP (ADVANCE CARE PLANNING)
Advance Care Planning (ACP) Provider Conversation Snapshot    Date of ACP Conversation: 05/09/18  Copy of advanced directive received from  see scanned  [address]

## 2018-05-09 NOTE — MR AVS SNAPSHOT
[address] [facility], [place] 
 
 
 [address] [address] 
[phone] Patient: Ally Randolph MRN:  :[id number] Visit Information Date & Time Provider Department Dept. Phone Encounter #  
 2018 10:20 AM NANNETTE Casas [address] [id number] Follow-up Instructions Return if symptoms worsen or fail to improve. Upcoming Health Maintenance Date Due  
 BREAST CANCER SCRN MAMMOGRAM 2018 Influenza Age 5 to Adult 2018* MICROALBUMIN Q1 2018 HEMOGLOBIN A1C Q6M 2018 LIPID PANEL Q1 2018 EYE EXAM RETINAL OR DILATED Q1 2019 FOOT EXAM Q1 3/19/2019 MEDICARE YEARLY EXAM 5/10/2019 GLAUCOMA SCREENING Q2Y 2020 DTaP/Tdap/Td series (2 - Td) 2027 *Topic was postponed. The date shown is not the original due date. Allergies as of 2018  Review Complete On: 2018 By: Angela Morgan NP Severity Noted Reaction Type Reactions Phenergan [Promethazine] High 2014   Side Effect Other (comments) Made patient very hyper and extremely agitated Current Immunizations  Reviewed on 2016 Name Date Influenza Vaccine PF 2013 Influenza Vaccine Split 10/7/2011 Not reviewed this visit You Were Diagnosed With   
  
 Codes Comments Urine leukocytes    -  Primary ICD-10-CM: R82.99 
ICD-9-CM: 791.7 History of acute cystitis     ICD-10-CM: Z87.440 ICD-9-CM: V13.02 Vitals BP Pulse Temp Resp Height(growth percentile) Weight(growth percentile)  
 118/68 (BP 1 Location: Left arm, BP Patient Position: Sitting) 74 98 °F (36.7 °C) (Oral) 18 5' 4\" (1.626 m) 169 lb (76.7 kg) SpO2 BMI OB Status Smoking Status 98% 29.01 kg/m2 Postmenopausal Former Smoker BMI and BSA Data Body Mass Index Body Surface Area  
 29.01 kg/m 2 1.86 m 2 Preferred Pharmacy Pharmacy Name Phone [facility] [address] [facility] [phone] Your Updated Medication List  
  
   
This list is accurate as of 5/9/18 11:11 AM.  Always use your most recent med list.  
  
  
  
  
 CYSTEX CRANBERRY PO Take 1 Cap by mouth daily as needed. escitalopram oxalate 10 mg tablet Commonly known as:  Negrete Calk TAKE 1 TABLET DAILY  
  
 gabapentin 300 mg capsule Commonly known as:  NEURONTIN Take 3 Caps by mouth nightly. Indications: Restless Legs Syndrome  
  
 hydroCHLOROthiazide 25 mg tablet Commonly known as:  HYDRODIURIL  
TAKE 1 TABLET DAILY  
  
 levothyroxine 100 mcg tablet Commonly known as:  SYNTHROID  
TAKE 1 TABLET DAILY BEFORE BREAKFAST  
  
 losartan 50 mg tablet Commonly known as:  COZAAR  
TAKE 1 TABLET DAILY  
  
 metFORMIN 500 mg tablet Commonly known as:  GLUCOPHAGE Take 500 mg by mouth two (2) times daily (with meals). We Performed the Following AMB POC URINALYSIS DIP STICK AUTO W/O MICRO [39510 CPT(R)] Follow-up Instructions Return if symptoms worsen or fail to improve. To-Do List   
 05/09/2018 Microbiology:  CULTURE, URINE Introducing [facility]! [facility] introduces [employer] patient portal. Now you can access parts of your medical record, email your doctor's office, and request medication refills online. 1. In your internet browser, go to [email or web address] 2. Click on the First Time User? Click Here link in the Sign In box. You will see the New Member Sign Up page. 3. Enter your [employer] Access Code exactly as it appears below. You will not need to use this code after youve completed the sign-up process. If you do not sign up before the expiration date, you must request a new code. · [employer] Access Code: [email or web address] Expires: 7/28/2018  5:36 PM 
 
4.  Enter the last four digits of your Social Security Number (xxxx) and Date of Birth (mm/dd/yyyy) as indicated and click Submit. You will be taken to the next sign-up page. 5. Create a [employer] ID. This will be your [employer] login ID and cannot be changed, so think of one that is secure and easy to remember. 6. Create a [employer] password. You can change your password at any time. 7. Enter your Password Reset Question and Answer. This can be used at a later time if you forget your password. 8. Enter your e-mail address. You will receive e-mail notification when new information is available in [address] 9. Click Sign Up. You can now view and download portions of your medical record. 10. Click the Download Summary menu link to download a portable copy of your medical information. If you have questions, please visit the Frequently Asked Questions section of the [employer] website. Remember, [employer] is NOT to be used for urgent needs. For medical emergencies, dial 911. Now available from your iPhone and Android! Please provide this summary of care documentation to your next provider. Your primary care clinician is listed as Melissa Ha. If you have any questions after today's visit, please call [phone].

## 2018-05-09 NOTE — PROGRESS NOTES
Subjective:   Ally Randolph is a [de-identified] y.o. female follow up acute cystitis 4-29-18 [facility]. Patient does report that she did not feel like her self had some nausea and called her grandson because her daughter Brion Hashimoto was out of town. Her grandson took her to the hospital.    Was given IV ceftriaxone while in the emergency department. Reviewed diagnostics with patient. Patient was sent home on septra DS po bid for 3 days which she has completed. Patient reports that she really does not experience any prodromal symptoms with bladder infections  She has a complete urological work up with Dr. Mauro Sevilla that yielded no abnormalities. Her daughter Brion Hashimoto is concerned over her general health and living alone. Patient has agreed to consider living in an assisted living residence. ROS; denies urinary frequency, denies urine malodor, denies fever, chills, denies nausea or vomiting or flank pain. Current Outpatient Prescriptions   Medication Sig Dispense Refill    losartan (COZAAR) 50 mg tablet TAKE 1 TABLET DAILY 90 Tab 1    hydroCHLOROthiazide (HYDRODIURIL) 25 mg tablet TAKE 1 TABLET DAILY 90 Tab 1    escitalopram oxalate (LEXAPRO) 10 mg tablet TAKE 1 TABLET DAILY 90 Tab 2    metFORMIN (GLUCOPHAGE) 500 mg tablet Take 500 mg by mouth two (2) times daily (with meals).  CRAN/VITC/MANNOSE/FOS/BROMELN (CYSTEX CRANBERRY PO) Take 1 Cap by mouth daily as needed.  LORazepam (ATIVAN) 0.5 mg tablet Take 1 Tab by mouth every eight (8) hours as needed for Anxiety. Max Daily Amount: 1.5 mg. 30 Tab 0    gabapentin (NEURONTIN) 300 mg capsule Take 3 Caps by mouth nightly. Indications: Restless Legs Syndrome 270 Cap 2    levothyroxine (SYNTHROID) 100 mcg tablet TAKE 1 TABLET DAILY BEFORE BREAKFAST 90 Tab 3      PMH: reviewed medications and allergy lists and medical and family history.   OBJECTIVE:  Awake and alert in no acute distress  Lungs clear throughout  S1 S2 RRR without ectopy or murmur auscultated. Abdomen: normoactive bowel sounds all quadrants +suprapbupic tenderness negative CVAT bilaterally  Reviewed vital signs   POC urine results as follows:  Visit Vitals    /68 (BP 1 Location: Left arm, BP Patient Position: Sitting)    Pulse 74    Temp 98 °F (36.7 °C) (Oral)    Resp 18    Ht 5' 4\" (1.626 m)    Wt 169 lb (76.7 kg)    SpO2 98%    BMI 29.01 kg/m2     Results for orders placed or performed in visit on 05/09/18   AMB POC URINALYSIS DIP STICK AUTO W/O MICRO   Result Value Ref Range    Color (UA POC) Yellow     Clarity (UA POC) Slightly Cloudy     Glucose (UA POC) Negative Negative    Bilirubin (UA POC) Negative Negative    Ketones (UA POC) Negative Negative    Specific gravity (UA POC) 1.005 1.001 - 1.035    Blood (UA POC) Trace Negative    pH (UA POC) 5.5 4.6 - 8.0    Protein (UA POC) Negative Negative    Urobilinogen (UA POC) 0.2 mg/dL 0.2 - 1    Nitrites (UA POC) Negative Negative    Leukocyte esterase (UA POC) 2+ Negative   Diagnoses and all orders for this visit:    1. Urine leukocytes  -     CULTURE, URINE; Future    2. History of acute cystitis  -     AMB POC URINALYSIS DIP STICK AUTO W/O MICRO  -     CULTURE, URINE; Future    patient declines all health maintenance and immunizations  Will let patient know of urine culture results. Anticipatory guidance regarding emergency care if symptoms worsen and patient verbalizes understanding. Patient verbalizes understanding. I have discussed the diagnosis with the patient and the intended plan as seen in the above orders. The patient has received an after-visit summary and questions were answered concerning future plans. I have discussed medication side effects and warnings with the patient as well. Follow-up Disposition:  Return if symptoms worsen or fail to improve.

## 2018-06-01 NOTE — PROGRESS NOTES
Contacted patient for Transitions of Care Coordination  follow up & close case. No answer. Left message introducing self, role and reason for call. Requested return call. Contact information provided. Will attempt to contact at a later time.

## 2018-06-04 NOTE — PROGRESS NOTES
Transitions of Care Coordination  Follow-Up    Date/Time:  6/4/2018 1:15 PM     NN contacted patient for Transitions of Care Coordination  follow up. Spoke to patient. Introduced self/role and reason for call. Patient reported: \"I'm doing good. I saw Dr. Mauro Sevilla on 5/31/18 and he gave me more antibiotics for my urinary tract infection. \" Reports doing well on antibiotics. No other symptoms reported. Pertinent negatives: SOB    Medications:   New medications since last outreach: yes, antibiotic (she could not recall name)  Does patient need refills on any medications: no  Medication changes since last outreach (dose adjustments or discontinued meds): no    Home Health company: n/a    Barriers to care? none at this time    Specialist appointments since last outreach? yes  If so, specialist and date: Dr. Mauro Sevilla on 5/31/18    Patient reminded that there are physicians on call 24 hours a day / 7 days a week (M-F 5pm to 8am and from Friday 5pm until Monday 8a for the weekend) should the patient have questions or concerns. No future appointments. Goals     None        Patient has graduated from the Transitions of Care Coordination  program on 6/4/18.

## 2018-06-08 PROBLEM — N39.0 ACUTE UTI: Status: ACTIVE | Noted: 2018-01-01

## 2018-06-08 PROBLEM — E87.1 HYPONATREMIA: Status: ACTIVE | Noted: 2018-01-01

## 2018-06-08 PROBLEM — R53.1 WEAKNESS GENERALIZED: Status: ACTIVE | Noted: 2018-01-01

## 2018-06-08 PROBLEM — R31.9 HEMATURIA: Status: ACTIVE | Noted: 2018-01-01

## 2018-06-08 PROBLEM — R91.1 PULMONARY NODULE: Status: ACTIVE | Noted: 2018-01-01

## 2018-06-08 PROBLEM — E87.6 HYPOKALEMIA: Status: ACTIVE | Noted: 2018-01-01

## 2018-06-08 NOTE — ROUTINE PROCESS
Pt alert and oriented x 4. No distress noted while on room air. Denies pain. Pt continues to have chung catheter in place. Urine color is red with no clots. Pt tolerating diabetic diet. Pt ambulates with assistance. Call bell within reach. Fall precautions maintained. Will continue to monitor. Bedside shift change report given to Pati Reddy Rn (oncoming nurse) by Daren Ny RN (offgoing nurse). Report included the following information SBAR, Kardex, Intake/Output, MAR, Recent Results and Med Rec Status.

## 2018-06-08 NOTE — IP AVS SNAPSHOT
[address] [address] [address] Patient: Glendy Johnson MRN: [id number] :[id number] A check bianca indicates which time of day the medication should be taken. My Medications START taking these medications Instructions Each Dose to Equal  
 Morning Noon Evening Bedtime OTHER Your last dose was: Your next dose is: PLEASE CHECK URINE CULTURE AND SENSITIVITY - PENDING AT TIME OF DISCHARGE AT [facility] OTHER Your last dose was: Your next dose is: To PCP - Check Urine culture results from  [facility] - and change antibiotics as appropriate  - Patient has new Lung nodule - s/b [facility] [place] [facility] - SHE NEEDS FOLLOW UP WITH THEM IN 1 -2 WEEKS CHANGE how you take these medications Instructions Each Dose to Equal  
 Morning Noon Evening Bedtime  
 ciprofloxacin HCl 500 mg tablet Commonly known as:  CIPRO What changed:   
- medication strength 
- how much to take Your last dose was: Your next dose is: Take 1 Tab by mouth two (2) times a day. 500 mg CONTINUE taking these medications Instructions Each Dose to Equal  
 Morning Noon Evening Bedtime CYSTEX CRANBERRY PO Your last dose was: Your next dose is: Take 1 Cap by mouth daily as needed. 1 Cap  
    
   
   
   
  
 escitalopram oxalate 10 mg tablet Commonly known as:  Velazquez Abts Your last dose was: Your next dose is: TAKE 1 TABLET DAILY  
     
   
   
   
  
 gabapentin 300 mg capsule Commonly known as:  NEURONTIN Your last dose was: Your next dose is: Take 3 Caps by mouth nightly. Indications: Restless Legs Syndrome 900 mg  
    
   
   
   
  
 hydroCHLOROthiazide 25 mg tablet Commonly known as:  HYDRODIURIL  
   
 Your last dose was: Your next dose is: TAKE 1 TABLET DAILY  
     
   
   
   
  
 levothyroxine 100 mcg tablet Commonly known as:  SYNTHROID Your last dose was: Your next dose is: TAKE 1 TABLET DAILY BEFORE BREAKFAST  
     
   
   
   
  
 losartan 50 mg tablet Commonly known as:  COZAAR Your last dose was: Your next dose is: TAKE 1 TABLET DAILY  
     
   
   
   
  
 metFORMIN 500 mg tablet Commonly known as:  GLUCOPHAGE Your last dose was: Your next dose is: Take 500 mg by mouth two (2) times daily (with meals). 500 mg Where to Get Your Medications Information on where to get these meds will be given to you by the nurse or doctor. ! Ask your nurse or doctor about these medications  
  ciprofloxacin HCl 500 mg tablet  OTHER  
 OTHER

## 2018-06-08 NOTE — ED TRIAGE NOTES
Pt brought in by ems. About 0130 pt woke up and small amount of blood noted in urine. Changed pad and returned to bed. Next time she awoke dark red blood in urine.

## 2018-06-08 NOTE — CONSULTS
Urology Consult Note    Patient: Clotilde Dumont               Sex: female          DOA: 6/8/2018         YOB: 1935      Age:  [de-identified] y.o.        LOS:  LOS: 0 days              Referring physician: Yaa Ribera  Reason for consult: gross hematuria      Assessment   This is a [de-identified] y.o. female with -  Gross hematuria  Bilateral hydronephrosis  Recurrent UTIs  Mixed incontinence    Plan   1. Hematuria self limited currently. Urine light pink. 2. Unclear etiology of bilateral hydronephrosis. Ureters dilated to distal ureter. Renal function is normal and no flank pain. Will plan for outpatient cystoscopy with retrograde pyelograms to further elucidate cause of hydronephrosis. No inpatient indication for procedure. OK to eat. 3. Can remove chung prior to discharge. 4. Send urine for culture. HPI:     Clotilde Dumont is a [de-identified] y.o. female who has been is currently admitted with  Hematuria.    1 day h/o painless gross hematuria. No flank pain. 1 prior episode years ago. Recurrent UTIs  Has severe incontinence, mixed. Wears diapers. No urologic surgery. No h/o kidney stones. No flank pain.      Past Medical History:   Diagnosis Date    Allergies     Diabetes mellitus ([facility] [address] 1/11/2010    Hearing loss 1/11/2010    HTN (hypertension) 1/11/2010    Hypercholesteremia 1/11/2010    Hypothyroidism 1/11/2010    Macular degeneration     shanae Stovall     Left femural neck    Otosclerosis     Left ear    Panic attack     Thyroid disease     hypothyroid    Unspecified adverse effect of anesthesia     hard to wake up in the past. Had own blood transfusion and was better       Past Surgical History:   Procedure Laterality Date    HX HEENT      Left stapedectomy 10/03    HX HEENT  7/2012    eye lids lifted Dr. We[place] HX MALIGNANT [address] [place] [address] Road  07/26/2013    Upper arm basal cell skin cancer removal    HX ORTHOPAEDIC      Right hip replacement 2004       Family History Problem Relation Age of Onset    Breast Cancer Sister 70    Heart Attack Father     Heart Attack Daughter 52     March 2018       Social History     Social History    Marital status:      Spouse name: N/A    Number of children: N/A    Years of education: N/A     Social History Main Topics    Smoking status: Former Smoker     Packs/day: 1.00     Years: 20.00     Types: Cigarettes     Quit date: 8/27/1995    Smokeless tobacco: Never Used    Alcohol use No    Drug use: No    Sexual activity: No     Other Topics Concern    Not on file     Social History Narrative       Prior to Admission medications    Medication Sig Start Date End Date Taking? Authorizing Provider   ciprofloxacin HCl (CIPRO PO) Take 1 Cap by mouth two (2) times a day. Yes Phys Other, MD   losartan (COZAAR) 50 mg tablet TAKE 1 TABLET DAILY 4/19/18  Yes Ivett Simon NP   hydroCHLOROthiazide (HYDRODIURIL) 25 mg tablet TAKE 1 TABLET DAILY 4/19/18  Yes Ivett Simon NP   escitalopram oxalate (LEXAPRO) 10 mg tablet TAKE 1 TABLET DAILY 4/5/18  Yes Ivett Simon NP   metFORMIN (GLUCOPHAGE) 500 mg tablet Take 500 mg by mouth two (2) times daily (with meals). Yes Historical Provider   CRAN/VITC/MANNOSE/FOS/BROMELN (CYSTEX CRANBERRY PO) Take 1 Cap by mouth daily as needed. Yes Historical Provider   gabapentin (NEURONTIN) 300 mg capsule Take 3 Caps by mouth nightly.  Indications: Restless Legs Syndrome 12/18/17  Yes Ivett Simon NP   levothyroxine (SYNTHROID) 100 mcg tablet TAKE 1 TABLET DAILY BEFORE BREAKFAST 12/7/17  Yes Ivett Simon NP       Allergies   Allergen Reactions    Phenergan [Promethazine] Other (comments)     Made patient very hyper and extremely agitated         Review of Systems  Constitutional: negative  Eyes: negative  Ears, Nose, Mouth, Throat, and Face: negative  Respiratory: negative  Cardiovascular: negative  Gastrointestinal: negative  Genitourinary:positive for hematuria  Hematologic/Lymphatic: negative  Musculoskeletal:negative  Neurological: negative         Physical Exam:      Visit Vitals    /72    Pulse 64    Temp 98.2 °F (36.8 °C)    Resp 14    Ht 5' 4\" (1.626 m)    Wt 165 lb 9.1 oz (75.1 kg)    SpO2 93%    BMI 28.42 kg/m2       Physical Exam:   GENERAL: alert, cooperative, no distress, appears stated age  HEENT: NCAT, PERRL  CV: RRR  LUNG: unlabored breathing  ABDOMEN: soft, non-tender. No masses,  no organomegaly  EXTREMITIES:  extremities normal, atraumatic, no cyanosis or edema  SKIN: no jaundice  : no CVA tenderness      Lab/Data Review:  BMP: Lab Results   Component Value Date/Time     (L) 06/08/2018 03:45 AM    K 3.3 (L) 06/08/2018 03:45 AM    CL 89 (L) 06/08/2018 03:45 AM    CO2 26 06/08/2018 03:45 AM    AGAP 11 06/08/2018 03:45 AM     (H) 06/08/2018 03:45 AM    BUN 8 06/08/2018 03:45 AM    CREA 0.86 06/08/2018 03:45 AM    GFRAA >60 06/08/2018 03:45 AM    GFRNA >60 06/08/2018 03:45 AM     CBC: Lab Results   Component Value Date/Time    WBC 5.5 06/08/2018 03:45 AM    HGB 11.8 (L) 06/08/2018 03:45 AM    HCT 32.8 (L) 06/08/2018 03:45 AM     06/08/2018 03:45 AM     COAGS: Lab Results   Component Value Date/Time    PTP 12.0 06/08/2018 03:45 AM    INR 0.9 06/08/2018 03:45 AM          CT Results:    Results from Hospital Encounter encounter on 06/08/18   CT CHEST WO CONT   Narrative CT scan of chest, without intravenous contrast:        INDICATION:    The patient presented with back pain, recurrent UTI, abdominal pain, and  dizziness. History of pulmonary nodule. CT scan of chest is being done for evaluation of pulmonary nodule. TECHNIQUE:    Multidetector CT scan with 5 mm size thickness, without intravenous contrast,  including chest and subphrenic levels. Coronal and sagittal images are reformatted.     All CT scans at this facility are performed using dose optimization technique as  appropriate to a  performed examination, to include automated exposer control,  adjustment mA and / or  KV according to patient size (including appropriate  matching  for site specific examination), or use  of iterative  reconstruction  technique. COMPARISON STUDY: Portable chest x-ray on 4/29/2018. No previous CT scan of chest is available for comparison. Comparison is being  made with the CT images of base of lungs obtained with CT scan of abdomen on  6/8/2018. FINDINGS:        Mediastinum and pulmonary stephan:  No diagnostic finding at the thoracic inlet. In the right paratracheal area and  aortopulmonary window area there are a few subcentimeter lymph nodes, which are  nonspecific. No obvious subcarinal lymphadenopathy. Ascending thoracic aorta is  ectatic with a diameter of 3.88 cm. Dense calcified plaques in the coronary arteries, indicating CAD. No evidence of pericardial effusion or thickening. No evidence of mediastinal mass or hematoma. Pleural spaces:  No evidence of pleural effusion, obvious pleural plaque or calcification. Bilateral lungs: In the apical segment of right upper lobe there is a pulmonary nodule measuring  9.8 mm x 8.7 mm x 15.6 mm. the nodule appears to have spiculated outlines. In the superior segment of right lower lobe as on image number 23 of series 3  there is a small pulmonary nodule, 3.1 mm in diameter. There is no additional pulmonary nodule identified. At the basal right middle lobe there are mild-to-moderate atelectatic changes  with fibrotic changes, similar to previous CT scan of abdomen. At the medial  aspect of basal right middle lobe there is focal wedge-shaped smaller density,  indicating old focal atelectasis, similar to previous CT scan of abdomen. In left lower lingula there are mild atelectatic/fibrotic changes. At the posterior bases of bilateral lower lobe there are minimal dependent  atelectatic changes left more than right.     Lungs are mildly hyperinflated. ----------------------------------------------------    Thoracic bony structures: There is no definable focal lesion in thoracic bony structures. Mild-to-moderate  multilevel spondylolytic changes in thoracic spine noted. Subphrenic images: Tail and body of pancreas are visualized and appear to be moderate to markedly  atrophic. Right adrenal gland appears to be normal. There is small left adrenal nodule  measuring 1.1 cm in diameter. On the subphrenic images in visualized upper and midportion of liver there are  findings of mild generalized fatty infiltration without definable focal lesion. Spleen appears to be of generous size measuring 12 cm SI, 1.6 cm AP and 8.3 cm  transverse.    -----------------------------------------------------------------------------         Impression IMPRESSION:    In the apical segment of upper lobe of right lung there is a pulmonary nodule  measuring 9.8 mm x 8.7 mm x 15.6 mm with spiculated outlines. Malignancy not  excluded. No previous CT scan of chest is available for comparison. For further evaluation, PET-CT scan, suggested. Tiny nodule measuring 3.1 mm in size in right lower lobe, probably nonspecific. Mild pulmonary emphysema and mild scattered interstitial fibrosis in lungs  without obvious bulla formation. No evidence of pleural effusion or pneumothorax. Chronic or subacute atelectasis in right middle lobe and base of left lower  lingula. Small left adrenal nodule, 1.1 cm in diameter. Additional findings, as above in body of report. US Results:    Results from [place] encounter on 12/06/16   US RETROPERITONEUM COMP   Narrative RETROPERITONEAL ULTRASOUND    INDICATION: Recurrent UTI.     COMPARISON: None    TECHNIQUE:   Sonographic interrogation of the abdomen was performed by the radiologic  technologist. Representative gray scale, color Doppler, and/or spectral waveform  images were submitted to the radiologist for interpretation. FINDINGS:   Right kidney 9.4 x 3.9 x 3.8 cm. Renal morphology unremarkable. No contour  deforming solid mass. Parenchymal echogenicity within normal limits. No  hydronephrosis or perinephric fluid collection. Unremarkable flow pattern upon  color Doppler interrogation. Incidentally imaged portions of liver grossly  unremarkable. Left kidney 11.0 x 4.6 x 3.5 cm. Renal morphology unremarkable. No contour  deforming solid mass. Parenchymal echogenicity within normal limits. No  hydronephrosis or perinephric fluid collection. Unremarkable flow pattern upon  color Doppler interrogation. Incidentally imaged portions of spleen grossly  unremarkable. Urinary bladder morphologically unremarkable; urinary bladder volume 240 mL (no  post-void volume provided). Impression IMPRESSION:  No acute process.           Nora Gomes MD    Pager: [phone]  Office: [phone]

## 2018-06-08 NOTE — ED PROVIDER NOTES
EMERGENCY DEPARTMENT HISTORY AND PHYSICAL EXAM    3:21 AM      Date: 6/8/2018  Patient Name: Cole Linton    History of Presenting Illness     Chief Complaint   Patient presents with    Blood in Urine         History Provided By: Patient    Chief Complaint: hematuria  Duration: 2 Hours  Timing:  Acute  Location:   Quality:  Severity: Moderate  Modifying Factors:   Associated Symptoms: nausea      Additional History (Context): Cole Linton is a [de-identified] y.o. female with diabetes, hypertension and hyperlipidemia who presents with hematuria that began about 2 hours ago. She reports nausea for 3 days without lightheadedness, dizziness abdominal pain, vomiting or diarrhea. She denies previous similar episodes, but notes frequent UTIs. Patient does not take blood thinners. No other symptoms or concerns were expressed. PCP: Genna Snellen, NP    Current Facility-Administered Medications   Medication Dose Route Frequency Provider Last Rate Last Dose    sterile water (preservative free) injection             cefTRIAXone (ROCEPHIN) 2 g in sterile water (preservative free) 20 mL IV syringe  2 g IntraVENous ONCE Michael Elliott MD         Current Outpatient Prescriptions   Medication Sig Dispense Refill    ciprofloxacin HCl (CIPRO PO) Take 1 Cap by mouth two (2) times a day.  losartan (COZAAR) 50 mg tablet TAKE 1 TABLET DAILY 90 Tab 1    hydroCHLOROthiazide (HYDRODIURIL) 25 mg tablet TAKE 1 TABLET DAILY 90 Tab 1    escitalopram oxalate (LEXAPRO) 10 mg tablet TAKE 1 TABLET DAILY 90 Tab 2    metFORMIN (GLUCOPHAGE) 500 mg tablet Take 500 mg by mouth two (2) times daily (with meals).  CRAN/VITC/MANNOSE/FOS/BROMELN (CYSTEX CRANBERRY PO) Take 1 Cap by mouth daily as needed.  gabapentin (NEURONTIN) 300 mg capsule Take 3 Caps by mouth nightly.  Indications: Restless Legs Syndrome 270 Cap 2    levothyroxine (SYNTHROID) 100 mcg tablet TAKE 1 TABLET DAILY BEFORE BREAKFAST 90 Tab 3       Past History     Past Medical History:  Past Medical History:   Diagnosis Date    Allergies     Diabetes mellitus ([facility] [address] 1/11/2010    Hearing loss 1/11/2010    HTN (hypertension) 1/11/2010    Hypercholesteremia 1/11/2010    Hypothyroidism 1/11/2010    Macular degeneration     shanae Biggs Brow     Left femural neck    Otosclerosis     Left ear    Panic attack     Thyroid disease     hypothyroid    Unspecified adverse effect of anesthesia     hard to wake up in the past. Had own blood transfusion and was better       Past Surgical History:  Past Surgical History:   Procedure Laterality Date    HX HEENT      Left stapedectomy 10/03    HX HEENT  7/2012    eye lids lifted Dr. Fabiola Serra HX MALIGNANT SKIN [address] [facility]  07/26/2013    Upper arm basal cell skin cancer removal    HX ORTHOPAEDIC      Right hip replacement 2004       Family History:  Family History   Problem Relation Age of Onset    Breast Cancer Sister 70    Heart Attack Father     Heart Attack Daughter 52     March 2018       Social History:  Social History   Substance Use Topics    Smoking status: Former Smoker     Packs/day: 1.00     Years: 20.00     Types: Cigarettes     Quit date: 8/27/1995    Smokeless tobacco: Never Used    Alcohol use No       Allergies: Allergies   Allergen Reactions    Phenergan [Promethazine] Other (comments)     Made patient very hyper and extremely agitated           Review of Systems       Review of Systems   Constitutional: Negative for activity change, fatigue and fever. HENT: Negative for congestion and rhinorrhea. Eyes: Negative for visual disturbance. Respiratory: Negative for shortness of breath. Cardiovascular: Negative for chest pain and palpitations. Gastrointestinal: Positive for nausea. Negative for abdominal pain, diarrhea and vomiting. Genitourinary: Positive for hematuria. Negative for dysuria. Musculoskeletal: Negative for back pain. Skin: Negative for rash.    Neurological: Negative for dizziness, weakness and light-headedness. All other systems reviewed and are negative. Physical Exam     Visit Vitals    /53    Pulse 82    Temp 98.6 °F (37 °C)    Resp 18    SpO2 94%         Physical Exam      Diagnostic Study Results     Labs -  Recent Results (from the past 12 hour(s))   URINALYSIS W/ RFLX MICROSCOPIC    Collection Time: 06/08/18  3:34 AM   Result Value Ref Range    Color RED      Appearance BLOODY      Specific gravity 1.010 1.003 - 1.030      pH (UA) 8.5 (H) 5.0 - 8.0      Protein >300 (A) NEG mg/dL    Glucose 250 (A) NEG mg/dL    Ketone 40 (A) NEG mg/dL    Bilirubin NEGATIVE  NEG      Blood LARGE (A) NEG      Urobilinogen 4.0 (H) 0.2 - 1.0 EU/dL    Nitrites NEGATIVE  NEG      Leukocyte Esterase LARGE (A) NEG     URINE MICROSCOPIC ONLY    Collection Time: 06/08/18  3:34 AM   Result Value Ref Range    WBC  0 - 4 /hpf     UNABLE TO QUANTITATE MICROSCOPIC PARAMETERS DUE TO EXCESSIVE RBCS    RBC TOO NUMEROUS TO COUNT 0 - 5 /hpf    Epithelial cells  0 - 5 /lpf     UNABLE TO QUANTITATE MICROSCOPIC PARAMETERS DUE TO EXCESSIVE RBCS    Bacteria (A) NEG /hpf     UNABLE TO QUANTITATE MICROSCOPIC PARAMETERS DUE TO EXCESSIVE RBCS   CBC WITH AUTOMATED DIFF    Collection Time: 06/08/18  3:45 AM   Result Value Ref Range    WBC 5.5 4.6 - 13.2 K/uL    RBC 4.03 (L) 4.20 - 5.30 M/uL    HGB 11.8 (L) 12.0 - 16.0 g/dL    HCT 32.8 (L) 35.0 - 45.0 %    MCV 81.4 74.0 - 97.0 FL    MCH 29.3 24.0 - 34.0 PG    MCHC 36.0 31.0 - 37.0 g/dL    RDW 13.7 11.6 - 14.5 %    PLATELET 517 981 - 932 K/uL    MPV 9.0 (L) 9.2 - 11.8 FL    NEUTROPHILS 72 40 - 73 %    LYMPHOCYTES 14 (L) 21 - 52 %    MONOCYTES 13 (H) 3 - 10 %    EOSINOPHILS 1 0 - 5 %    BASOPHILS 0 0 - 2 %    ABS. NEUTROPHILS 3.9 1.8 - 8.0 K/UL    ABS. LYMPHOCYTES 0.8 (L) 0.9 - 3.6 K/UL    ABS. MONOCYTES 0.7 0.05 - 1.2 K/UL    ABS. EOSINOPHILS 0.0 0.0 - 0.4 K/UL    ABS.  BASOPHILS 0.0 0.0 - 0.1 K/UL    DF AUTOMATED     METABOLIC PANEL, BASIC Collection Time: 06/08/18  3:45 AM   Result Value Ref Range    Sodium 126 (L) 136 - 145 mmol/L    Potassium 3.3 (L) 3.5 - 5.5 mmol/L    Chloride 89 (L) 100 - 108 mmol/L    CO2 26 21 - 32 mmol/L    Anion gap 11 3.0 - 18 mmol/L    Glucose 155 (H) 74 - 99 mg/dL    BUN 8 7.0 - 18 MG/DL    Creatinine 0.86 0.6 - 1.3 MG/DL    BUN/Creatinine ratio 9 (L) 12 - 20      GFR est AA >60 >60 ml/min/1.73m2    GFR est non-AA >60 >60 ml/min/1.73m2    Calcium 8.6 8.5 - 10.1 MG/DL   PROTHROMBIN TIME + INR    Collection Time: 06/08/18  3:45 AM   Result Value Ref Range    Prothrombin time 12.0 11.5 - 15.2 sec    INR 0.9 0.8 - 1.2         Radiologic Studies -   CT ABD PELV WO CONT   Final Result      CT CHEST WO CONT    (Results Pending)         Medical Decision Making   I am the first provider for this patient. I reviewed the vital signs, available nursing notes, past medical history, past surgical history, family history and social history. Vital Signs-Reviewed the patient's vital signs. Physical exam:  General:  Well-developed, well-nourished, no apparent distress. Head:  Normocephalic atraumatic. Eyes:  Pupils midrange extraocular movements intact. No pallor or conjunctival injection. Nose:  No rhinorrhea, inspection grossly normal.    Ears:  Grossly normal to inspection, no discharge. Mouth:  Mucous membranes moist, no appreciable intraoral lesion. Neck/Back:  Trachea midline, no asymmetry. Chest:  Grossly normal inspection, symmetric chest rise. Pulmonary:  Clear to auscultation bilaterally no wheezes rhonchi or rales. Cardiovascular:  S1-S2 no murmurs rubs or gallops. Abdomen: Soft, nontender, nondistended no guarding rebound or peritoneal signs. No CVA tenderness  Extremities:  Grossly normal to inspection, peripheral pulses intact    Neurologic:  Alert and oriented no appreciable focal neurologic deficit. Skin:  Warm and dry  Psychiatric:  Grossly normal mood and affect.     Nursing note reviewed, vital signs reviewed. ED course:  Patient with blood in urine since this morning, no back pain, history of recurrent urinary tract infections,, she does have some mild nausea, most likely urinary tract infection rather than nephrolithiasis or other recurrent bleed, she is afebrile and not tachycardic saturation normal on room air so doubtful a significant blood loss no inflammatory bowel disease or prior history of abdominal surgeries/malignancy so doubtful a fistula, we'll send urine and evaluated for recurrent UTI, she does have urologist follow with her reportedly did a full workup that was unremarkable      Urinalysis TNTC red blood cells, large leukocyte esterase, she is currently on Cipro from her urologist for UTI    White count not elevated    Labs: New anemia, new hyponatremia     Consult:  Discussed care with Dr. Jennifer Murray. Standard discussion; including history of patients chief complaint, available diagnostic results, and treatment course. Admit to medicine agree with antibiotic he will see in hospital    Patient's presentation, history, physical exam and laboratory evaluations were reviewed. I felt the patient would benefit from inpatient management and treatment. Consult:  Discussed care with Dr. Trinh Ellsworth. Standard discussion; including history of patients chief complaint, available diagnostic results, and treatment course. Patient was accepted to their service. Reviewed chart, has a prior pulmonary nodule, please to CT thorax without contrast and will admit        Disposition:    Admitted to medicine service      Portions of this chart were created with Dragon medical speech to text program.   Unrecognized errors may be present. Follow-up Information     None           Patient's Medications   Start Taking    No medications on file   Continue Taking    CIPROFLOXACIN HCL (CIPRO PO)    Take 1 Cap by mouth two (2) times a day.     CRAN/VITC/MANNOSE/FOS/BROMELN (CYSTEX CRANBERRY PO) Take 1 Cap by mouth daily as needed. ESCITALOPRAM OXALATE (LEXAPRO) 10 MG TABLET    TAKE 1 TABLET DAILY    GABAPENTIN (NEURONTIN) 300 MG CAPSULE    Take 3 Caps by mouth nightly. Indications: Restless Legs Syndrome    HYDROCHLOROTHIAZIDE (HYDRODIURIL) 25 MG TABLET    TAKE 1 TABLET DAILY    LEVOTHYROXINE (SYNTHROID) 100 MCG TABLET    TAKE 1 TABLET DAILY BEFORE BREAKFAST    LOSARTAN (COZAAR) 50 MG TABLET    TAKE 1 TABLET DAILY    METFORMIN (GLUCOPHAGE) 500 MG TABLET    Take 500 mg by mouth two (2) times daily (with meals). These Medications have changed    No medications on file   Stop Taking    No medications on file     _______________________________    Attestations:  Esther Slater acting as a scribe for and in the presence of Odette De Paz MD      June 08, 2018 at [facility]       Provider Attestation:      I personally performed the services described in the documentation, reviewed the documentation, as recorded by the scribe in my presence, and it accurately and completely records my words and actions.  June 08, 2018 at 3:21 AM - Odette De Paz MD    _______________________________

## 2018-06-08 NOTE — ROUTINE PROCESS
Report received from [facility]. Patient is alert, in bed, no distress noted, no c/o. Call bell in reach. Continue to monitor. 2230  Patient requested medicine for sleep, melatonin ordered and given. Continue to monitor. 8150  Patient is alert, no distress noted. IV fluids infusing as ordered. Navarro in place, urine continues to be hematuric. Hemaglobin 10.8. Dr Gerri Rivas made aware. Continue to monitor. 0745   Bedside and Verbal shift change report given to Kathye Paget RN (oncoming nurse) by Macy Cope RN (offgoing nurse). Report included the following information SBAR, Kardex, MAR and Recent Results.

## 2018-06-08 NOTE — ED NOTES
Attempted insertion 3-way chung times 2. Unsuccessful. Pt tolerated poorly. Chung catheter inserted per orders. Urine bloody. 600 mL on insertion. Bag dated and timed. Sterile technique used. Pt tolerated well.

## 2018-06-08 NOTE — PROGRESS NOTES
Problem: Falls - Risk of  Goal: *Absence of Falls  Document Jose Fall Risk and appropriate interventions in the flowsheet.   Outcome: Progressing Towards Goal  Fall Risk Interventions:            Medication Interventions: Teach patient to arise slowly         History of Falls Interventions: Bed/chair exit alarm

## 2018-06-08 NOTE — PROGRESS NOTES
Patient Vitals for the past 24 hrs:   Temp Pulse Resp BP SpO2   06/08/18 1006 98.2 °F (36.8 °C) 64 14 121/72 93 %   06/08/18 0830 - 67 14 125/75 95 %   06/08/18 0711 - - - - 95 %   06/08/18 0710 - - - 130/53 -   06/08/18 0600 - - - 125/51 95 %   06/08/18 0537 - - - - 94 %   06/08/18 0536 - - - 128/53 -   06/08/18 0258 98.6 °F (37 °C) 82 18 149/73 95 %      notes reviewed. No plans for inpatient procedures at this time.   Continue with Ceftriaxone due to + UA, f/u urine cx  F/u CT chest  Follow CBC, BMP, urine electrolytes

## 2018-06-08 NOTE — ED NOTES
Assumed care of patient and received report from [facility]. Patient is alert and oriented x3, no signs of distress noted in patient at this time. Patient daughter is at bedside. Will continue to monitor.

## 2018-06-08 NOTE — PROGRESS NOTES
Dr. Lucian Guzmán asked if patient allowed to eat. Dr. Lucian Guzmán gave telephone order for consistent Carb diet.

## 2018-06-08 NOTE — H&P
History & Physical    Patient: Josette Flood MRN: [id number]  CSN: [id number]    YOB: 1935  Age: [de-identified] y.o. Sex: female      DOA: 6/8/2018    Chief Complaint:   Chief Complaint   Patient presents with    Blood in Urine          HPI:     Josette Flood is a [de-identified] y.o.  female who has PMH of HTN, DM, Pulmonary nodules, urinary incontinence and recurrent UTIs   Pt presents to ER with an onset of hematuria that started at 1 AM today. Pt waited for 2 hours to see if she will clear but at 3 AM, she had another episode and came to ER. Pt uses a diaper mostly 2 hx of possible urge incontinence and her diaper was soiled in bloody urine. In ER, chung catheter was placed and showed hematuria. UA +ve for blood and Pt has a drop in H/H. Pt is not on anticoagulation. Pt states that she had episodes of dark urine in the past but not +ve if there was blood in it. On admission, Pt was found Hyponatremic and Pt states that she drinking plenty of fluids for her recurrent UTIs   Because of Pt Hx of pulmonary nodules, will get CT chest to r/o lung mass.   Of note, pt denies weight loss, cough  but c/o generalized weakness     Past Medical History:   Diagnosis Date    Allergies     Diabetes mellitus ([address] 1/11/2010    Hearing loss 1/11/2010    HTN (hypertension) 1/11/2010    Hypercholesteremia 1/11/2010    Hypothyroidism 1/11/2010    Macular degeneration     shanae Millan     Left femural neck    Otosclerosis     Left ear    Panic attack     Thyroid disease     hypothyroid    Unspecified adverse effect of anesthesia     hard to wake up in the past. Had own blood transfusion and was better       Past Surgical History:   Procedure Laterality Date    HX HEENT      Left stapedectomy 10/03    HX HEENT  7/2012    eye lids lifted Dr. Marcelino Prabhakar HX MALIGNANT [address]  07/26/2013    Upper arm basal cell skin cancer removal    HX ORTHOPAEDIC      Right hip replacement 2004 Family History   Problem Relation Age of Onset    Breast Cancer Sister 70    Heart Attack Father     Heart Attack Daughter 52     March 2018       Social History     Social History    Marital status:      Spouse name: N/A    Number of children: N/A    Years of education: N/A     Social History Main Topics    Smoking status: Former Smoker     Packs/day: 1.00     Years: 20.00     Types: Cigarettes     Quit date: 8/27/1995    Smokeless tobacco: Never Used    Alcohol use No    Drug use: No    Sexual activity: No     Other Topics Concern    Not on file     Social History Narrative       Prior to Admission medications    Medication Sig Start Date End Date Taking? Authorizing Provider   ciprofloxacin HCl (CIPRO PO) Take 1 Cap by mouth two (2) times a day. Yes Phys Other, MD   losartan (COZAAR) 50 mg tablet TAKE 1 TABLET DAILY 4/19/18  Yes Jean Carlos Richard NP   hydroCHLOROthiazide (HYDRODIURIL) 25 mg tablet TAKE 1 TABLET DAILY 4/19/18  Yes Jean Carlos Richard NP   escitalopram oxalate (LEXAPRO) 10 mg tablet TAKE 1 TABLET DAILY 4/5/18  Yes Jean Carlos Richard NP   metFORMIN (GLUCOPHAGE) 500 mg tablet Take 500 mg by mouth two (2) times daily (with meals). Yes Historical Provider   CRAN/VITC/MANNOSE/FOS/BROMELN (CYSTEX CRANBERRY PO) Take 1 Cap by mouth daily as needed. Yes Historical Provider   gabapentin (NEURONTIN) 300 mg capsule Take 3 Caps by mouth nightly. Indications: Restless Legs Syndrome 12/18/17  Yes Jean Carlos Richard NP   levothyroxine (SYNTHROID) 100 mcg tablet TAKE 1 TABLET DAILY BEFORE BREAKFAST 12/7/17  Yes Jean Carlos Richard NP       Allergies   Allergen Reactions    Phenergan [Promethazine] Other (comments)     Made patient very hyper and extremely agitated           Review of Systems  GENERAL: Patient alert, awake and oriented times 3, able to communicate full sentences and not in distress. HEENT: No change in vision, no earache, tinnitus, sore throat or sinus congestion. NECK: No pain or stiffness. PULMONARY: No shortness of breath, cough or wheeze. Cardiovascular:+ve pnd / orthopnea, no CP  GASTROINTESTINAL: No abdominal pain, nausea, vomiting or diarrhea, melena or bright red blood per rectum. GENITOURINARY: +ve urinary incontinence, NO dysuria. Bloody urine   MUSCULOSKELETAL: No joint or muscle pain, no back pain, no recent trauma. DERMATOLOGIC: No rash, no itching, no lesions. ENDOCRINE: No polyuria, +ve polydipsia, no heat or cold intolerance. No recent change in weight. HEMATOLOGICAL: No anemia or easy bruising or bleeding. NEUROLOGIC: No headache, seizures, numbness, tingling but +ve for weakness. Physical Exam:     Physical Exam:  Visit Vitals    /53    Pulse 82    Temp 98.6 °F (37 °C)    Resp 18    SpO2 95%      O2 Device: Room air    Temp (24hrs), Av.6 °F (37 °C), Min:98.6 °F (37 °C), Max:98.6 °F (37 °C)     07 -  1900  In: -   Out: 600         General:  Alert, cooperative, no distress, appears stated age. Head: Normocephalic, without obvious abnormality, atraumatic. Eyes:  Conjunctivae/corneas clear. PERRL, EOMs intact. Nose: Nares normal. No drainage or sinus tenderness. Neck: Supple, symmetrical, trachea midline, no adenopathy, thyroid: no enlargement, no carotid bruit and no JVD. Lungs:   Clear to auscultation bilaterally. Heart:  Regular rate and rhythm, S1, S2 normal.     Abdomen: Soft, non-tender. Bowel sounds normal.    Extremities: Extremities normal, atraumatic, no cyanosis or edema. Pulses: 2+ and symmetric all extremities. Skin:  No rashes or lesions   Neurologic: AAOx3, No focal motor or sensory deficit. Labs Reviewed: All lab results for the last 24 hours reviewed.   CT, CXR and EKG    Procedures/imaging: see electronic medical records for all procedures/Xrays and details which were not copied into this note but were reviewed prior to creation of Plan      Assessment/Plan Principal Problem:    Hematuria (6/8/2018)    Active Problems:    Type 2 diabetes mellitus with diabetic neuropathy ([facility] [address] (3/19/2018)      Hyponatremia (6/8/2018)      Acute UTI (6/8/2018)      Hypokalemia (6/8/2018)      Weakness generalized (6/8/2018)      Pulmonary nodule (6/8/2018)    Pt is admitted for Hematuria with B/L Hydronephrosis and possible bladder mass   Hyponatremia with hx of Pulmonary nodules r/o lung cancer  Recurrent UTI with generalized weakness with h/o Urinary Incontinence    Case D/W Urology to admit for further work up  Ceftriaxone 2 gm.  Pt was on Cipro yet to continued to have +ve UA   Urine Cx  Multiple visits for recurrent UTI >> Mostly K. PNA >> pan sensitive   NPO for possible Urologic procedure     CT chest pending >> further rec as per results   IVF for hyponatremia and irrigation   Fluid restriction  Will send urine/serum osmolarity and electrolytes     HTN >> will hold HCTZ and losartan to avoid renal toxicity   Starting Amlodipine 10 mg instead     DM >> stable On Metformin  Holding for now >> SSI    DVT/GI Prophylaxis: SCD's    Plan of care is discussed in details with Patient/Family at bedside and agreed upon    Enrique Kaplan MD  6/8/2018 7:45 AM

## 2018-06-08 NOTE — IP AVS SNAPSHOT
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 
  
   make appointment for follow up in urology Discharge Orders None A check bianca indicates which time of day the medication should be taken. My Medications START taking these medications Instructions Each Dose to Equal  
 Morning Noon Evening Bedtime OTHER Your last dose was: Your next dose is: PLEASE CHECK URINE CULTURE AND SENSITIVITY - PENDING AT TIME OF DISCHARGE AT [facility] OTHER Your last dose was: Your next dose is: To PCP - Check Urine culture results from [facility] - and change antibiotics as appropriate  - Patient has new Lung nodule [facility] - SHE NEEDS FOLLOW UP WITH THEM IN 1 -2 WEEKS CHANGE how you take these medications Instructions Each Dose to Equal  
 Morning Noon Evening Bedtime  
 ciprofloxacin HCl 500 mg tablet Commonly known as:  CIPRO What changed:   
- medication strength 
- how much to take Your last dose was: Your next dose is: Take 1 Tab by mouth two (2) times a day.   
 500 mg  
    
   
   
   
  
  
 CONTINUE taking these medications Instructions Each Dose to Equal  
 Morning Noon Evening Bedtime CYSTEX CRANBERRY PO Your last dose was: Your next dose is: Take 1 Cap by mouth daily as needed. 1 Cap  
    
   
   
   
  
 escitalopram oxalate 10 mg tablet Commonly known as:  Napolean Mantle Your last dose was: Your next dose is: TAKE 1 TABLET DAILY  
     
   
   
   
  
 gabapentin 300 mg capsule Commonly known as:  NEURONTIN Your last dose was: Your next dose is: Take 3 Caps by mouth nightly. Indications: Restless Legs Syndrome 900 mg  
    
   
   
   
  
 hydroCHLOROthiazide 25 mg tablet Commonly known as:  HYDRODIURIL Your last dose was: Your next dose is: TAKE 1 TABLET DAILY  
     
   
   
   
  
 levothyroxine 100 mcg tablet Commonly known as:  SYNTHROID Your last dose was: Your next dose is: TAKE 1 TABLET DAILY BEFORE BREAKFAST  
     
   
   
   
  
 losartan 50 mg tablet Commonly known as:  COZAAR Your last dose was: Your next dose is: TAKE 1 TABLET DAILY  
     
   
   
   
  
 metFORMIN 500 mg tablet Commonly known as:  GLUCOPHAGE Your last dose was: Your next dose is: Take 500 mg by mouth two (2) times daily (with meals). 500 mg Where to Get Your Medications Information on where to get these meds will be given to you by the nurse or doctor. ! Ask your nurse or doctor about these medications  
  ciprofloxacin HCl 500 mg tablet OTHER  
 OTHER Discharge Instructions None ACO Transitions of Care Introducing On license of [facility] [address] offers a voluntary care coordination program to provide high quality service and care to [facility] fee-for-service beneficiaries. Asha Lopez was designed to help you enhance your health and well-being through the following services: ? Transitions of Care  support for individuals who are transitioning from one care setting to another (example: Hospital to home). ? Chronic and Complex Care Coordination  support for individuals and caregivers of those with serious or chronic illnesses or with more than one chronic (ongoing) condition and those who take a number of different medications. If you meet specific medical criteria, a [address] [place] [address] Rd may call you directly to coordinate your care with your primary care physician and your other care providers. For questions about the [facility] programs, please, contact your physicians office. For general questions or additional information about Accountable Care Organizations: 
Please visit www.medicare.gov/acos. html or call 1-800-MEDICARE (1-481.499.3533) TTY users should call 9-141.483.9103. Meta Announcement We are excited to announce that we are making your provider's discharge notes available to you in Meta. You will see these notes when they are completed and signed by the physician that discharged you from your recent hospital stay. If you have any questions or concerns about any information you see in Meta, please call the Health Information Department where you were seen or reach out to your Primary Care Provider for more information about your plan of care. Introducing [facility]! [facility] introduces Meta patient portal. Now you can access parts of your medical record, email your doctor's office, and request medication refills online. 1. In your internet browser, go to [email or web address] 2. Click on the First Time User? Click Here link in the Sign In box. You will see the New Member Sign Up page. 3. Enter your Meta Access Code exactly as it appears below.  You will not need to use this code after youve completed the sign-up process. If you do not sign up before the expiration date, you must request a new code. · Moov cc. Access Code: [email or web address] Expires: 7/28/2018  5:36 PM 
 
4. Enter the last four digits of your Social Security Number (xxxx) and Date of Birth (mm/dd/yyyy) as indicated and click Submit. You will be taken to the next sign-up page. 5. Create a Moov cc. ID. This will be your Moov cc. login ID and cannot be changed, so think of one that is secure and easy to remember. 6. Create a Moov cc. password. You can change your password at any time. 7. Enter your Password Reset Question and Answer. This can be used at a later time if you forget your password. 8. Enter your e-mail address. You will receive e-mail notification when new information is available in [address] 9. Click Sign Up. You can now view and download portions of your medical record. 10. Click the Download Summary menu link to download a portable copy of your medical information. If you have questions, please visit the Frequently Asked Questions section of the Moov cc. website. Remember, Moov cc. is NOT to be used for urgent needs. For medical emergencies, dial 911. Now available from your iPhone and Android! Introducing Marcello Silverman As a [facility] patient, I wanted to make you aware of our electronic visit tool called Marcello Herrera. Ashley Rosales 24/7 allows you to connect within minutes with a medical provider 24 hours a day, seven days a week via a mobile device or tablet or logging into a secure website from your computer. You can access Marcello Herrera from anywhere in the [place].  
 
A virtual visit might be right for you when you have a simple condition and feel like you just dont want to get out of bed, or cant get away from work for an appointment, when your regular [facility] provider is not available (evenings, weekends or holidays), or when youre out of town and need minor care. Electronic visits cost only $49 and if the [facility] 24/7 provider determines a prescription is needed to treat your condition, one can be electronically transmitted to a nearby pharmacy*. Please take a moment to enroll today if you have not already done so. The enrollment process is free and takes just a few minutes. To enroll, please download the Bango 24/7 mary to your tablet or phone, or visit [email or web address] to enroll on your computer. And, as an [address] [place] [address] patient with a EarlyShares account, the results of your visits will be scanned into your electronic medical record and your primary care provider will be able to view the scanned results. We urge you to continue to see your regular [facility] provider for your ongoing medical care. And while your primary care provider may not be the one available when you seek a Marcello [place] Biotechventurafin virtual visit, the peace of mind you get from getting a real diagnosis real time can be priceless. For more information on Spectral Imageventurafin, view our Frequently Asked Questions (FAQs) at [email or web address]. Sincerely, 
 
Marycruz Lucio MD 
Chief Medical Officer [employer] *:  certain medications cannot be prescribed via [email or web address] Unresulted Labs-Please follow up with your PCP about these lab tests Order Current Status CULTURE, URINE Preliminary result Providers Seen During Your Hospitalization Provider Specialty Primary office phone Miguelangel Anglin MD Emergency Medicine [phone] Merissa Su MD Infectious Diseases [phone] Chanel Simon MD Hospitalist [phone] Deanna Mike MD Internal Medicine [phone] Your Primary Care Physician (PCP) Primary Care Physician Office Phone Office Fax Diya Werner [phone] You are allergic to the following Allergen Reactions Phenergan (Promethazine) Other (comments) Made patient very hyper and extremely agitated Recent Documentation Height Weight BMI OB Status Smoking Status 1.626 m 75.2 kg 28.46 kg/m2 Postmenopausal Former Smoker Emergency Contacts Name Discharge Info Relation Home Work Mobile [facility] HOSPITAL DISCHARGE CAREGIVER [3] Daughter [[phone] Patient Belongings The following personal items are in your possession at time of discharge: 
  Dental Appliances: None  Visual Aid:  (reading glassess)      Home Medications: None   Jewelry: Ring, Watch (3 yellow rings, 1 watch, )  Clothing: Undergarments, Pajamas, Robe    Other Valuables: At bedside (book) Discharge Instructions Attachments/References HEMATURIA (ENGLISH) CYSTOSCOPY: PRE-OP (ENGLISH) CYSTOSCOPY: POST-OP (ENGLISH) Patient Handouts Blood in the Urine: Care Instructions Your Care Instructions Blood in the urine, or hematuria, may make the urine look red, brown, or pink. There may be blood every time you urinate or just from time to time. You cannot always see blood in the urine, but it will show up in a urine test. 
Blood in the urine may be serious. It should always be checked by a doctor. Your doctor may recommend more tests, including an X-ray, a CT scan, or a cystoscopy (which lets a doctor look inside the urethra and bladder). Blood in the urine can be a sign of another problem. Common causes are bladder infections and kidney stones. An injury to your groin or your genital area can also cause bleeding in the urinary tract. Very hard exercise-such as running a marathon-can cause blood in the urine. Blood in the urine can also be a sign of kidney disease or cancer in the bladder or kidney.  Many cases of blood in the urine are caused by a harmless condition that runs in families. This is called benign familial hematuria. It does not need any treatment. Sometimes your urine may look red or brown even though it does not contain blood. For example, not getting enough fluids (dehydration), taking certain medicines, or having a liver problem can change the color of your urine. Eating foods such as beets, rhubarb, or blackberries or foods with red food coloring can make your urine look red or pink. Follow-up care is a key part of your treatment and safety. Be sure to make and go to all appointments, and call your doctor if you are having problems. It's also a good idea to know your test results and keep a list of the medicines you take. When should you call for help? Call your doctor now or seek immediate medical care if: 
· You have symptoms of a urinary infection. For example: ¨ You have pus in your urine. ¨ You have pain in your back just below your rib cage. This is called flank pain. ¨ You have a fever, chills, or body aches. ¨ It hurts to urinate. ¨ You have groin or belly pain. · You have more blood in your urine. Watch closely for changes in your health, and be sure to contact your doctor if: 
· You have new urination problems. · You do not get better as expected. Where can you learn more? Go to http://joey-andrey.info/. Enter J421 in the search box to learn more about \"Blood in the Urine: Care Instructions. \" Current as of: May 12, 2017 Content Version: 11.4 © 5068-0779 [employer]. Care instructions adapted under license by [employer] (which disclaims liability or warranty for this information). If you have questions about a medical condition or this instruction, always ask your healthcare professional. [address] any warranty or liability for your use of this information. Cystoscopy: Before Your Procedure What is a cystoscopy? A cystoscopy is a procedure that lets a doctor look inside your bladder and urethra. The urethra is the tube that carries urine from the bladder to outside the body. The doctor uses a thin, lighted tool called a cystoscope. With this tool, he or she can look for kidney or bladder stones. The doctor can also look for tumors, bleeding, or infection. If you are in a clinic and you are awake, you will get gel to numb your urethra. This makes the procedure more comfortable. Then the doctor puts the tube into your urethra and moves it into your bladder. Next, the doctor fills your bladder with liquid. This helps him or her see better. It may cause you to feel pressure in your bladder area for a short time. If you are in the hospital, you may get medicine to make you sleep during the procedure. While you are asleep, the doctor can take samples of tissue. These will be checked for cancer and other problems. This is called a biopsy. If you have a biopsy, you may have a small amount of blood in your urine for several days. You may also need a catheter. It's a tube that drains urine from your bladder. Your doctor will take it out at your follow-up visit. Follow-up care is a key part of your treatment and safety. Be sure to make and go to all appointments, and call your doctor if you are having problems. It's also a good idea to know your test results and keep a list of the medicines you take. What happens before the procedure? ?Preparing for the procedure ? · Understand exactly what procedure is planned, along with the risks, benefits, and other options. · Tell your doctors ALL the medicines, vitamins, supplements, and herbal remedies you take. Some of these can increase the risk of bleeding or interact with anesthesia. ? · If you take blood thinners, such as warfarin (Coumadin), clopidogrel (Plavix), or aspirin, be sure to talk to your doctor.  He or she will tell you if you should stop taking these medicines before your procedure. Make sure that you understand exactly what your doctor wants you to do.  
? · Your doctor will tell you which medicines to take or stop before your procedure. You may need to stop taking certain medicines a week or more before the procedure. So talk to your doctor as soon as you can.  
? · If you have an advance directive, let your doctor know. It may include a living will and a durable power of  for health care. Bring a copy to the hospital. If you don't have one, you may want to prepare one. It lets your doctor and loved ones know your health care wishes. Doctors advise that everyone prepare these papers before any type of surgery or procedure. Procedures can be stressful. This information will help you understand what you can expect. And it will help you safely prepare for your procedure. What happens on the day of the procedure? · Follow the instructions exactly about when to stop eating and drinking. If you don't, your procedure may be canceled. If your doctor told you to take your medicines on the day of your procedure, take them with only a sip of water. ? · Take a bath or shower before you come in for your procedure. Do not apply lotions, perfumes, deodorants, or nail polish. ? · Take off all jewelry and piercings. And take out contact lenses, if you wear them. ? At the hospital or surgery center · Bring a picture ID. ? · You will be asked to empty your bladder just before the procedure. ? · You will be kept comfortable and safe by your anesthesia provider. The anesthesia may make you sleep. Or it may just numb the area being worked on. ? · In most cases, the cystoscope is in the bladder for less than 10 minutes. But the entire test may take up to 45 minutes or longer. Going home · Be sure you have someone to drive you home. Anesthesia and pain medicine make it unsafe for you to drive. ? · You will be given more specific instructions about recovering from your procedure. They will cover things like diet, wound care, follow-up care, driving, and getting back to your normal routine. When should you call your doctor? · You have questions or concerns. ? · You don't understand how to prepare for your procedure. ? · You become ill before the procedure (such as fever, flu, or a cold). ? · You need to reschedule or have changed your mind about having the procedure. Where can you learn more? Go to http://joey-andrey.info/. Enter F656 in the search box to learn more about \"Cystoscopy: Before Your Procedure. \" Current as of: May 12, 2017 Content Version: 11.4 © 0996-9417 Per[employer]. Care instructions adapted under license by SeeJay (which disclaims liability or warranty for this information). If you have questions about a medical condition or this instruction, always ask your healthcare professional. [address] [address] any warranty or liability for your use of this information. Cystoscopy: What to Expect at [facility] Your Recovery A cystoscopy is a procedure that lets a doctor look inside of the bladder and the urethra. The urethra is the tube that carries urine from the bladder to outside the body. The doctor uses a thin, lighted tool called a cystoscope. Your bladder is filled with fluid. This stretches the bladder so that your doctor can look closely at the inside of your bladder. After the cystoscopy, your urethra may be sore at first, and it may burn when you urinate for the first few days after the procedure. You may feel the need to urinate more often, and your urine may be pink. These symptoms should get better in 1 or 2 days. You will probably be able to go back to most of your usual activities in 1 or 2 days.  
This care sheet gives you a general idea about how long it will take for you to recover. But each person recovers at a different pace. Follow the steps below to get better as quickly as possible. How can you care for yourself at home? Activity ? · Rest when you feel tired. Getting enough sleep will help you recover. ? · Try to walk each day. Start by walking a little more than you did the day before. Bit by bit, increase the amount you walk. Walking boosts blood flow and helps prevent pneumonia and constipation. ? · Avoid strenuous activities, such as bicycle riding, jogging, weight lifting, or aerobic exercise, until your doctor says it is okay. ? · Ask your doctor when you can drive again. ? · Most people are able to return to work within 1 or 2 days after the procedure. ? · You may shower and take baths as usual.  
? · Ask your doctor when it is okay for you to have sex. Diet ? · You can eat your normal diet. If your stomach is upset, try bland, low-fat foods like plain rice, broiled chicken, toast, and yogurt. ? · Drink plenty of fluids (unless your doctor tells you not to). Medicines ? · Take pain medicines exactly as directed. ¨ If the doctor gave you a prescription medicine for pain, take it as prescribed. ¨ If you are not taking a prescription pain medicine, ask your doctor if you can take an over-the-counter medicine. ? · If you think your pain medicine is making you sick to your stomach: 
¨ Take your medicine after meals (unless your doctor has told you not to). ¨ Ask your doctor for a different pain medicine. ? · If your doctor prescribed antibiotics, take them as directed. Do not stop taking them just because you feel better. You need to take the full course of antibiotics. Follow-up care is a key part of your treatment and safety. Be sure to make and go to all appointments, and call your doctor if you are having problems. It's also a good idea to know your test results and keep a list of the medicines you take. When should you call for help? Call 911 anytime you think you may need emergency care. For example, call if: 
? · You passed out (lost consciousness). ? · You have severe trouble breathing. ? · You have sudden chest pain and shortness of breath, or you cough up blood. ? · You have severe belly pain. ?Call your doctor now or seek immediate medical care if: 
? · You are sick to your stomach or cannot keep fluids down. ? · Your urine is still red or you see blood clots after you have urinated several times. ? · You have trouble passing urine or stool, especially if you have pain or swelling in your lower belly. ? · You have signs of a blood clot, such as: 
¨ Pain in your calf, back of the knee, thigh, or groin. ¨ Redness and swelling in your leg or groin. ? · You develop a fever or severe chills. ? · You have pain in your back just below your rib cage. This is called flank pain. ? Watch closely for changes in your health, and be sure to contact your doctor if: 
? · You have pain or burning when you urinate. A burning feeling is normal for a day or two after the test, but call if it does not get better. ? · You have a frequent urge to urinate but can pass only small amounts of urine. ? · Your urine is pink, red, or cloudy, or smells bad. It is normal for the urine to have a pinkish color for a few days after the test, but call if it does not get better. Where can you learn more? Go to http://joey-andrey.info/. Enter A165 in the search box to learn more about \"Cystoscopy: What to Expect at Home. \" Current as of: May 12, 2017 Content Version: 11.4 © 2840-9937 Ninite. Care instructions adapted under license by [employer]One (which disclaims liability or warranty for this information).  If you have questions about a medical condition or this instruction, always ask your healthcare professional. [address] any warranty or liability for your use of this information. Please provide this summary of care documentation to your next provider. Signatures-by signing, you are acknowledging that this After Visit Summary has been reviewed with you and you have received a copy. Patient Signature:  ____________________________________________________________ Date:  ____________________________________________________________  
  
Temple Mince Provider Signature:  ____________________________________________________________ Date:  ____________________________________________________________

## 2018-06-08 NOTE — PROGRESS NOTES
TRANSFER - IN REPORT:    Verbal report received from Connor Trujillo RN (name) on Milton Stuart  being received from ED(unit) for routine progression of care      Report consisted of patients Situation, Background, Assessment and   Recommendations(SBAR). Information from the following report(s) SBAR, Kardex, ED Summary, Intake/Output, MAR, Recent Results and Cardiac Rhythm SR was reviewed with the receiving nurse. Opportunity for questions and clarification was provided. Assessment completed upon patients arrival to unit and care assumed.

## 2018-06-09 NOTE — PROGRESS NOTES
Problem: Falls - Risk of  Goal: *Absence of Falls  Document Jose Fall Risk and appropriate interventions in the flowsheet. Outcome: Progressing Towards Goal  Fall Risk Interventions:            Medication Interventions: Patient to call before getting OOB         History of Falls Interventions:  Investigate reason for fall, Bed/chair exit alarm

## 2018-06-09 NOTE — PROGRESS NOTES
Urology Progress Note        Assessment/Plan:     Patient Active Problem List   Diagnosis Code    HTN (hypertension) I10    Hypothyroidism E03.9    Hearing loss H91.90    Hypercholesteremia E78.00    Vertigo R42    RLS (restless legs syndrome) G25.81    Left hip pain M25.552    History of UTI Z87.440    Osteoarthrosis, unspecified whether generalized or localized, lower leg M17.10    Recurrent UTI N39.0    Essential hypertension I10    Type 2 diabetes mellitus without complication, without long-term current use of insulin (HCC) E11.9    Atrophic vaginitis N95.2    Type 2 diabetes mellitus with diabetic neuropathy (HCC) E11.40    Hematuria R31.9    Hyponatremia E87.1    Acute UTI N39.0    Hypokalemia E87.6    Weakness generalized R53.1    Pulmonary nodule R91.1       ASSESSMENT: Aracelis Latham is a [de-identified] y.o. female: 1. Gross hematuria - resolved. Bilateral hydronephrosis  Recurrent UTIs - Pseudomonas  Mixed incontinence      Plan:    1. Urine clear. D/C chung. 2. Send home w/ cx specific abx for pseudomonas for 7 day course  3. Outpt cysto, to eval UTI and hematuria. Will also need mgmt of Mixed Urinary incontinence and overactive bladder. Jn Monzon MD    Office: [phone]  Pager: [phone]    Daily Progress Note: 2018 1:46 PM  Admit Date: 2018     Subjective:     No acute  changes / events. Chung irritation. Urine clear. Reports OAB sx and mixed urinary incontinence. Objective:     Visit Vitals    /72 (BP 1 Location: Right arm, BP Patient Position: At rest;Sitting)    Pulse 69    Temp 97.7 °F (36.5 °C)    Resp 20    Ht 5' 4\" (1.626 m)    Wt 165 lb 11.2 oz (75.2 kg)    SpO2 95%    BMI 28.44 kg/m2        Temp (24hrs), Av.8 °F (36.6 °C), Min:97.5 °F (36.4 °C), Max:98.1 °F (36.7 °C)      Intake and Output:   1901 -  0700  In: 2427.5 [P.O.:240;  I.V.:2187.5]  Out: 3750 [Urine:3150]  701 -  1900  In: -   Out: 950 [Urine:950]    PHYSICAL EXAMINATION:   Visit Vitals    /72 (BP 1 Location: Right arm, BP Patient Position: At rest;Sitting)    Pulse 69    Temp 97.7 °F (36.5 °C)    Resp 20    Ht 5' 4\" (1.626 m)    Wt 165 lb 11.2 oz (75.2 kg)    SpO2 95%    BMI 28.44 kg/m2     Constitutional: Well developed, well nourished. No acute distress. HEENT: Normocephalic,   CV:  RRR  Respiratory: No respiratory distress or difficulties breathing   Abdomen:  Soft, non-tender, non-distended   Female:  CVA tenderness: None                          Navarro: clear urine    Skin: No evidence of jaundice. Normal color  Neuro/Psych:  Alert and oriented. Affect appropriate. Lab/Data Review: All lab results for the last 24 hours reviewed.     Labs:     Labs: Results:   Chemistry  Recent Labs      06/09/18   0531  06/08/18   0345   GLU  119*  155*   NA  137  126*   K  4.0  3.3*   CL  103  89*   CO2  25  26   BUN  4*  8   CREA  0.73  0.86   CA  8.4*  8.6   AGAP  9  11   BUCR  5*  9*      CBC w/Diff Recent Labs      06/09/18   0531  06/08/18   0345   WBC  3.5*  5.5   RBC  3.78*  4.03*   HGB  10.8*  11.8*   HCT  31.9*  32.8*   PLT  242  229   GRANS  56  72   LYMPH  23  14*   EOS  1  1      Cultures Recent Labs      06/08/18   0625   CULT  >100,000 COLONIES/mL PSEUDOMONAS SPECIES*     All Micro Results     Procedure Component Value Units Date/Time    CULTURE, URINE [[id number]] Collected:  06/09/18 1030    Order Status:  Completed Specimen:  Clean catch Updated:  06/09/18 1126    CULTURE, URINE [[id number]]  (Abnormal) Collected:  06/08/18 0625    Order Status:  Completed Specimen:  Clean catch Updated:  06/09/18 0903     Special Requests: NO SPECIAL REQUESTS        Culture result:         >100,000 COLONIES/mL PSEUDOMONAS SPECIES (A)            Urinalysis Color   Date Value Ref Range Status   06/08/2018 RED   Final     Appearance   Date Value Ref Range Status   06/08/2018 BLOODY   Final     Specific gravity   Date Value Ref Range Status   06/08/2018 1.010 1.003 - 1.030   Final     pH (UA)   Date Value Ref Range Status   06/08/2018 8.5 (H) 5.0 - 8.0   Final     Protein   Date Value Ref Range Status   06/08/2018 >300 (A) NEG mg/dL Final     Ketone   Date Value Ref Range Status   06/08/2018 40 (A) NEG mg/dL Final     Bilirubin   Date Value Ref Range Status   06/08/2018 NEGATIVE  NEG   Final     Blood   Date Value Ref Range Status   06/08/2018 LARGE (A) NEG   Final     Urobilinogen   Date Value Ref Range Status   06/08/2018 4.0 (H) 0.2 - 1.0 EU/dL Final     Nitrites   Date Value Ref Range Status   06/08/2018 NEGATIVE  NEG   Final     Leukocyte Esterase   Date Value Ref Range Status   06/08/2018 LARGE (A) NEG   Final     Potassium   Date Value Ref Range Status   06/09/2018 4.0 3.5 - 5.5 mmol/L Final     Creatinine   Date Value Ref Range Status   06/09/2018 0.73 0.6 - 1.3 MG/DL Final     BUN   Date Value Ref Range Status   06/09/2018 4 (L) 7.0 - 18 MG/DL Final      PSA No results for input(s): PSA in the last 72 hours. Coagulation Lab Results   Component Value Date/Time    Prothrombin time 12.0 06/08/2018 03:45 AM    Prothrombin time 25.0 (H) 11/05/2014 04:00 AM    INR 0.9 06/08/2018 03:45 AM    INR 2.3 (H) 11/05/2014 04:00 AM    aPTT 32.0 10/27/2014 12:34 PM    aPTT 33.7 09/22/2014 12:21 PM           Micro  Recent Labs      06/08/18   0625   CULT  >100,000 COLONIES/mL PSEUDOMONAS SPECIES*     Recent Labs      06/08/18   0625   CULT  >100,000 COLONIES/mL PSEUDOMONAS SPECIES*        US Results: (Most Recent)   Results from [place] encounter on 06/08/18   US PELV NON OBS  LTD   Narrative Ultrasound bladder    HISTORY: Gross hematuria. Bilateral hydronephrosis. COMPARISON: CT abdomen pelvis June 8, 2018. FINDINGS: Previous CT demonstrated bilateral hydronephrosis. Images through the  bladder was severely degraded due to hip prosthesis. Images of the bladder show large irregular soft tissue appearing lesion in the  lumen.  The lesion measured 3.3 x 2.0 x 5.9 cm. Neoplasm is the primary  consideration. The bladder measures 6.9 x 5.8 x 5.3 cm. Impression IMPRESSION:  1. Soft tissue structure in the bladder may represent neoplasm. Recommend direct  visualization. CT (Most Recent)   Results from Hospital Encounter encounter on 06/08/18   CT CHEST WO CONT   Narrative CT scan of chest, without intravenous contrast:        INDICATION:    The patient presented with back pain, recurrent UTI, abdominal pain, and  dizziness. History of pulmonary nodule. CT scan of chest is being done for evaluation of pulmonary nodule. TECHNIQUE:    Multidetector CT scan with 5 mm size thickness, without intravenous contrast,  including chest and subphrenic levels. Coronal and sagittal images are reformatted. All CT scans at this facility are performed using dose optimization technique as  appropriate to a  performed  examination, to include automated exposer control,  adjustment mA and / or  KV according to patient size (including appropriate  matching  for site specific examination), or use  of iterative  reconstruction  technique. COMPARISON STUDY: Portable chest x-ray on 4/29/2018. No previous CT scan of chest is available for comparison. Comparison is being  made with the CT images of base of lungs obtained with CT scan of abdomen on  6/8/2018. FINDINGS:        Mediastinum and pulmonary stephan:  No diagnostic finding at the thoracic inlet. In the right paratracheal area and  aortopulmonary window area there are a few subcentimeter lymph nodes, which are  nonspecific. No obvious subcarinal lymphadenopathy. Ascending thoracic aorta is  ectatic with a diameter of 3.88 cm. Dense calcified plaques in the coronary arteries, indicating CAD. No evidence of pericardial effusion or thickening. No evidence of mediastinal mass or hematoma.     Pleural spaces:  No evidence of pleural effusion, obvious pleural plaque or calcification. Bilateral lungs: In the apical segment of right upper lobe there is a pulmonary nodule measuring  9.8 mm x 8.7 mm x 15.6 mm. the nodule appears to have spiculated outlines. In the superior segment of right lower lobe as on image number 23 of series 3  there is a small pulmonary nodule, 3.1 mm in diameter. There is no additional pulmonary nodule identified. At the basal right middle lobe there are mild-to-moderate atelectatic changes  with fibrotic changes, similar to previous CT scan of abdomen. At the medial  aspect of basal right middle lobe there is focal wedge-shaped smaller density,  indicating old focal atelectasis, similar to previous CT scan of abdomen. In left lower lingula there are mild atelectatic/fibrotic changes. At the posterior bases of bilateral lower lobe there are minimal dependent  atelectatic changes left more than right. Lungs are mildly hyperinflated. ----------------------------------------------------    Thoracic bony structures: There is no definable focal lesion in thoracic bony structures. Mild-to-moderate  multilevel spondylolytic changes in thoracic spine noted. Subphrenic images: Tail and body of pancreas are visualized and appear to be moderate to markedly  atrophic. Right adrenal gland appears to be normal. There is small left adrenal nodule  measuring 1.1 cm in diameter. On the subphrenic images in visualized upper and midportion of liver there are  findings of mild generalized fatty infiltration without definable focal lesion. Spleen appears to be of generous size measuring 12 cm SI, 1.6 cm AP and 8.3 cm  transverse.    -----------------------------------------------------------------------------         Impression IMPRESSION:    In the apical segment of upper lobe of right lung there is a pulmonary nodule  measuring 9.8 mm x 8.7 mm x 15.6 mm with spiculated outlines. Malignancy not  excluded.  No previous CT scan of chest is available for comparison. For further evaluation, PET-CT scan, suggested. Tiny nodule measuring 3.1 mm in size in right lower lobe, probably nonspecific. Mild pulmonary emphysema and mild scattered interstitial fibrosis in lungs  without obvious bulla formation. No evidence of pleural effusion or pneumothorax. Chronic or subacute atelectasis in right middle lobe and base of left lower  lingula. Small left adrenal nodule, 1.1 cm in diameter. Additional findings, as above in body of report.                Current Facility-Administered Medications   Medication Dose Route Frequency    LORazepam (ATIVAN) tablet 0.5 mg  0.5 mg Oral Q8H PRN    ciprofloxacin (CIPRO) 400 mg IVPB (premix)  400 mg IntraVENous Q12H    escitalopram oxalate (LEXAPRO) tablet 10 mg  10 mg Oral DAILY    levothyroxine (SYNTHROID) tablet 100 mcg  100 mcg Oral 6am    amLODIPine (NORVASC) tablet 10 mg  10 mg Oral DAILY    0.9% sodium chloride infusion  125 mL/hr IntraVENous CONTINUOUS    acetaminophen (TYLENOL) tablet 650 mg  650 mg Oral Q6H PRN    oxyCODONE-acetaminophen (PERCOCET) 5-325 mg per tablet 1 Tab  1 Tab Oral Q6H PRN    naloxone (NARCAN) injection 0.4 mg  0.4 mg IntraVENous PRN    ondansetron (ZOFRAN) injection 4 mg  4 mg IntraVENous Q6H PRN    docusate sodium (COLACE) capsule 100 mg  100 mg Oral BID PRN    insulin lispro (HUMALOG) injection   SubCUTAneous AC&HS    glucose chewable tablet 16 g  4 Tab Oral PRN    glucagon (GLUCAGEN) injection 1 mg  1 mg IntraMUSCular PRN    dextrose (D50W) injection syrg 12.5-25 g  25-50 mL IntraVENous PRN

## 2018-06-09 NOTE — ROUTINE PROCESS
Bedside and Verbal shift change report given to Haley galaviz (oncoming nurse) by Sujatha Cabrera RN (offgoing nurse). Report included the following information SBAR, Kardex, MAR and Recent Results.     SITUATION:    Code Status: Full Code   Reason for Admission: Hematuria    [facility] day: 1   Problem List:       Hospital Problems  Date Reviewed: 5/9/2018          Codes Class Noted POA    * (Principal)Hematuria ICD-10-CM: R31.9  ICD-9-CM: 599.70  6/8/2018 Unknown        Hyponatremia ICD-10-CM: E87.1  ICD-9-CM: 276.1  6/8/2018 Unknown        Acute UTI ICD-10-CM: N39.0  ICD-9-CM: 599.0  6/8/2018 Unknown        Hypokalemia ICD-10-CM: E87.6  ICD-9-CM: 276.8  6/8/2018 Unknown        Weakness generalized ICD-10-CM: R53.1  ICD-9-CM: 780.79  6/8/2018 Unknown        Pulmonary nodule ICD-10-CM: R91.1  ICD-9-CM: 793.11  6/8/2018 Unknown        Type 2 diabetes mellitus with diabetic neuropathy ([facility] [address] ICD-10-CM: E11.40  ICD-9-CM: 250.60, 357.2  3/19/2018 Yes              BACKGROUND:    Past Medical History:   Past Medical History:   Diagnosis Date    Allergies     Diabetes mellitus ([facility] [address] 1/11/2010    Hearing loss 1/11/2010    HTN (hypertension) 1/11/2010    Hypercholesteremia 1/11/2010    Hypothyroidism 1/11/2010    Macular degeneration     shanae Portillo Aver     Left femural neck    Otosclerosis     Left ear    Panic attack     Thyroid disease     hypothyroid    Unspecified adverse effect of anesthesia     hard to wake up in the past. Had own blood transfusion and was better         Patient taking anticoagulants yes     ASSESSMENT:    Changes in Assessment Throughout Shift:      Patient has Central Line: no Reasons if yes:    Patient has Navarro Cath: no Reasons if yes:       Last Vitals:     Vitals:    06/09/18 0346 06/09/18 0821 06/09/18 1151 06/09/18 1720   BP:  152/76 157/72 132/59   Pulse:  64 69 73   Resp:  18 20 20   Temp:  97.7 °F (36.5 °C) 97.7 °F (36.5 °C) 98.2 °F (36.8 °C)   SpO2:   95% 91%   Weight: 75.2 kg (165 lb 11.2 oz)      Height:            IV and DRAINS (will only show if present)   [REMOVED] Peripheral IV 06/08/18 Left Arm-Site Assessment: Clean, dry, & intact  Peripheral IV 06/08/18 Right Wrist-Site Assessment: Clean, dry, & intact     WOUND (if present)   Wound Type:  none   Dressing present Dressing Present : No   Wound Concerns/Notes:  none     PAIN    Pain Assessment    Pain Intensity 1: 0 (06/09/18 1754)              Patient Stated Pain Goal: 0  o Interventions for Pain:  none  o Intervention effective: no  o Time of last intervention:    o Reassessment Completed: yes      Last 3 Weights:  Last 3 Recorded Weights in this Encounter    06/08/18 1006 06/09/18 0346   Weight: 75.1 kg (165 lb 9.1 oz) 75.2 kg (165 lb 11.2 oz)     Weight change:      INTAKE/OUPUT    Current Shift:      Last three shifts: 06/08 0701 - 06/09 1900  In: 2427.5 [P.O.:240; I.V.:2187.5]  Out: 5600 [Urine:5000]     LAB RESULTS     Recent Labs      06/09/18   0531  06/08/18   0345   WBC  3.5*  5.5   HGB  10.8*  11.8*   HCT  31.9*  32.8*   PLT  242  229        Recent Labs      06/09/18   0531  06/08/18   0345   NA  137  126*   K  4.0  3.3*   GLU  119*  155*   BUN  4*  8   CREA  0.73  0.86   CA  8.4*  8.6   MG  1.8   --    INR   --   0.9       RECOMMENDATIONS AND DISCHARGE PLANNING     1. Pending tests/procedures/ Plan of Care or Other Needs:      2. Discharge plan for patient and Needs/Barriers:     3. Estimated Discharge Date: tbd Posted on Whiteboard in [facility]: yes      4. The patient's care plan was reviewed with the oncoming nurse. \"HEALS\" SAFETY CHECK      Fall Risk    Total Score: 2    Safety Measures: Safety Measures: Bed/Chair-Wheels locked, Call light within reach    A safety check occurred in the patient's room between off going nurse and oncoming nurse listed above.     The safety check included the below items  Area Items   H  High Alert Medications - Verify all high alert medication drips (heparin, PCA, etc.)   E  Equipment - Suction is set up for ALL patients (with fermin)  - Red plugs utilized for all equipment (IV pumps, etc.)  - WOWs wiped down at end of shift.  - Room stocked with oxygen, suction, and other unit-specific supplies   A  Alarms - Bed alarm is set for fall risk patients  - Ensure chair alarm is in place and activated if patient is up in a chair   L  Lines - Check IV for any infiltration  - Navarro bag is empty if patient has a Navarro   - Tubing and IV bags are labeled   S  Safety   - Room is clean, patient is clean, and equipment is clean. - Hallways are clear from equipment besides carts. - Fall bracelet on for fall risk patients  - Ensure room is clear and free of clutter  - Suction is set up for ALL patients (with fermin)  - Hallways are clear from equipment besides carts.    - Isolation precautions followed, supplies available outside room, sign posted     Sharon John RN

## 2018-06-09 NOTE — PROGRESS NOTES
[facility] Hospitalist Group  Progress Note    Patient: Aracelis Latham Age: [de-identified] y.o. : 1935 MR#: [id number] SSN: [id number]  Date/Time: 2018     Subjective:     Review of systems    - c/o being restless   - anxiety   - nausea   - no Dysuria , no Fever chills       Assessment/Plan:   1. Gross hematuria - self limiting   2 UTI - POA - h/o recurrent UTI   3 Lung nodule   4 HTN   5 DM2     PLAN   - Note Urology plan \" Will plan for outpatient cystoscopy with retrograde pyelograms to further elucidate cause of hydronephrosis. \"    - Urine culture - pseudomonas , Rpt culture , on ceftriaxone . - change to Cipro and follow sensitivity   If rpt urin culture is still positive - change antibiotics per sensitivity   Though she is asymptomatic ( dysuria and fever )- she has severe hematuria - likely from cystitis ? Infective     - patient informed regarding pulmonary nodule ( also patient's daughter in room with her ) - pulmonary consult - likely will arrange out patient follow up - for PET scan. - add Ativan PRN for Anxiety         Case discussed with:  [x]Patient  [x]Family  [x]Nursing  []Case Management  DVT Prophylaxis:  []Lovenox  []Hep SQ  []SCDs  []Coumadin   []On Heparin gtt          Objective:   VS:   Visit Vitals    /76 (BP 1 Location: Right arm, BP Patient Position: At rest)    Pulse 64    Temp 97.7 °F (36.5 °C)    Resp 18    Ht 5' 4\" (1.626 m)    Wt 75.2 kg (165 lb 11.2 oz)    SpO2 98%    BMI 28.44 kg/m2      Tmax/24hrs: Temp (24hrs), Av.8 °F (36.6 °C), Min:97.5 °F (36.4 °C), Max:98.1 °F (36.7 °C)  IOBRIEF    Intake/Output Summary (Last 24 hours) at 18 1134  Last data filed at 18 1056   Gross per 24 hour   Intake           2427.5 ml   Output             3150 ml   Net           -722.5 ml       General:  Alert, cooperative, no acute distress   HEENT:  NC, Atraumatic. PERRLA, anicteric sclerae. Pulmonary:  Bilateral air entry present .  No Wheezing/Rhonchi/Rales. Cardiovascular: Regular rate and Rhythm. GI:  Soft, Non distended, Non tender. + Bowel sounds. Extremities:  No edema, cyanosis, clubbing. No calf tenderness. Psych:  Not anxious or agitated. Neurologic: Grossly - Motor and Sensory functions are intact .       Medications:   Current Facility-Administered Medications   Medication Dose Route Frequency    LORazepam (ATIVAN) tablet 0.5 mg  0.5 mg Oral Q8H PRN    ciprofloxacin (CIPRO) 400 mg IVPB (premix)  400 mg IntraVENous Q12H    escitalopram oxalate (LEXAPRO) tablet 10 mg  10 mg Oral DAILY    levothyroxine (SYNTHROID) tablet 100 mcg  100 mcg Oral 6am    amLODIPine (NORVASC) tablet 10 mg  10 mg Oral DAILY    0.9% sodium chloride infusion  125 mL/hr IntraVENous CONTINUOUS    acetaminophen (TYLENOL) tablet 650 mg  650 mg Oral Q6H PRN    oxyCODONE-acetaminophen (PERCOCET) 5-325 mg per tablet 1 Tab  1 Tab Oral Q6H PRN    naloxone (NARCAN) injection 0.4 mg  0.4 mg IntraVENous PRN    ondansetron (ZOFRAN) injection 4 mg  4 mg IntraVENous Q6H PRN    docusate sodium (COLACE) capsule 100 mg  100 mg Oral BID PRN    insulin lispro (HUMALOG) injection   SubCUTAneous AC&HS    glucose chewable tablet 16 g  4 Tab Oral PRN    glucagon (GLUCAGEN) injection 1 mg  1 mg IntraMUSCular PRN    dextrose (D50W) injection syrg 12.5-25 g  25-50 mL IntraVENous PRN       Labs:    Recent Labs      06/09/18   0531  06/08/18   0345   WBC  3.5*  5.5   HGB  10.8*  11.8*   HCT  31.9*  32.8*   PLT  242  229     Recent Labs      06/09/18   0531  06/08/18   0345   NA  137  126*   K  4.0  3.3*   CL  103  89*   CO2  25  26   GLU  119*  155*   BUN  4*  8   CREA  0.73  0.86   CA  8.4*  8.6   MG  1.8   --    PHOS  3.8   --    INR   --   0.9         Signed By: Reed Hale MD     June 9, 2018

## 2018-06-09 NOTE — CONSULTS
[facility]  Pulmonary, Critical Care, and Sleep Medicine    Initial Patient Consult    Name: Cristal Montes MRN: [id number]   : 1935 Hospital: [facility]   Date: 2018        IMPRESSION:   · RUL 9.8 mm x 8.7 mm x 15.6 mm spiculated nodule. The fact that this is upper lobe, spiculated, and in a former smoker makes this highly suspicious for malignancy. · Hyponatremia - seems to have resolved with NS infusion. Could be related to her obstruction/hydronephrosis, but must also consider SiADH related to lung nodule, especially if this is malignant  · B/L hydronephrosis - going to be w/u as outpatient      RECOMMENDATIONS:   · PET scan - outpatient. One could consider going straight for biopsy, however she is a very anxious lady, therefore suspect planning a PET scan would be the best route for her. Further work-up based on PET results: easier site found to bx vs IR bx vs resection vs serial imaging  · Obtain [facility] records that apparently document a prior pulmonary nodule  · Outpatient PFT's   · Close follow-up in pulmonary clinic. Will sign off at this time, but will coordinate with the outpatient clinic for follow-up     Subjective: This patient has been seen and evaluated at the request of Dr. Shreya Whittaker for RUL pulmonary nodule. Patient is a [de-identified] y.o. female that presented for hematuria and found to have b/l hydronephrosis. Seen by urology and will be undergoing an outpatient work-up. Imaging also revealed a RUL spiculated nodule. Patients daughter notes that the patient gets dyspnea with little exertion. Patient denies cough, hemoptysis, fevers, chills, prior TB infections, anorexia, night sweats, or unexplained weight loss. Patient has a 20 pack year smoking hx, quitting > 20 years ago. She lives by herself. Drives. Grocery shops. Able to carry out ADL's.         Past Medical History:   Diagnosis Date    Allergies     Diabetes mellitus ([address] 2010    Hearing loss 1/11/2010    HTN (hypertension) 1/11/2010    Hypercholesteremia 1/11/2010    Hypothyroidism 1/11/2010    Macular degeneration     shanae Husain     Left femural neck    Otosclerosis     Left ear    Panic attack     Thyroid disease     hypothyroid    Unspecified adverse effect of anesthesia     hard to wake up in the past. Had own blood transfusion and was better      Past Surgical History:   Procedure Laterality Date    HX HEENT      Left stapedectomy 10/03    HX HEENT  7/2012    eye lids lifted Dr. 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 Authorizing Provider   ciprofloxacin HCl (CIPRO PO) Take 1 Cap by mouth two (2) times a day. Yes Phys Guadalupe, MD   losartan (COZAAR) 50 mg tablet TAKE 1 TABLET DAILY 4/19/18  Yes Ivett Simon NP   hydroCHLOROthiazide (HYDRODIURIL) 25 mg tablet TAKE 1 TABLET DAILY 4/19/18  Yes Ivett Simon NP   escitalopram oxalate (LEXAPRO) 10 mg tablet TAKE 1 TABLET DAILY 4/5/18  Yes Ivett Simon NP   metFORMIN (GLUCOPHAGE) 500 mg tablet Take 500 mg by mouth two (2) times daily (with meals). Yes Historical Provider   CRAN/VITC/MANNOSE/FOS/BROMELN (CYSTEX CRANBERRY PO) Take 1 Cap by mouth daily as needed. Yes Historical Provider   gabapentin (NEURONTIN) 300 mg capsule Take 3 Caps by mouth nightly.  Indications: Restless Legs Syndrome 12/18/17  Yes Ivett Simon NP   levothyroxine (SYNTHROID) 100 mcg tablet TAKE 1 TABLET DAILY BEFORE BREAKFAST 12/7/17  Yes Ivett Simon NP     Allergies   Allergen Reactions    Phenergan [Promethazine] Other (comments)     Made patient very hyper and extremely agitated        Social History   Substance Use Topics    Smoking status: Former Smoker     Packs/day: 1.00     Years: 20.00     Types: Cigarettes     Quit date: 1995    Smokeless tobacco: Never Used    Alcohol use No      Family History   Problem Relation Age of Onset    Breast Cancer Sister 70    Heart Attack Father     Heart Attack Daughter       [email or web address]@    Current Facility-Administered Medications   Medication Dose Route Frequency    ciprofloxacin (CIPRO) 400 mg IVPB (premix)  400 mg IntraVENous Q12H    escitalopram oxalate (LEXAPRO) tablet 10 mg  10 mg Oral DAILY    levothyroxine (SYNTHROID) tablet 100 mcg  100 mcg Oral 6am    amLODIPine (NORVASC) tablet 10 mg  10 mg Oral DAILY    0.9% sodium chloride infusion  125 mL/hr IntraVENous CONTINUOUS    insulin lispro (HUMALOG) injection   SubCUTAneous AC&HS       Review of Systems:  A comprehensive review of systems was negative except for that written in the HPI. Objective:   Vital Signs:    Visit Vitals    /72 (BP 1 Location: Right arm, BP Patient Position: At rest;Sitting)    Pulse 69    Temp 97.7 °F (36.5 °C)    Resp 20    Ht 5' 4\" (1.626 m)    Wt 75.2 kg (165 lb 11.2 oz)    SpO2 95%    BMI 28.44 kg/m2       O2 Device: Room air       Temp (24hrs), Av.8 °F (36.6 °C), Min:97.5 °F (36.4 °C), Max:98.1 °F (36.7 °C)       Intake/Output:   Last shift:      701 -  190  In: -   Out: 950 [Urine:950]  Last 3 shifts:  1901 -  0700  In: 2427.5 [P.O.:240; I.V.:2187.5]  Out: 3750 [Urine:3150]    Intake/Output Summary (Last 24 hours) at 18 1236  Last data filed at 18 1056   Gross per 24 hour   Intake           2427.5 ml   Output             3150 ml   Net           -722.5 ml      Physical Exam:   General:  Alert, cooperative, no distress, appears stated age. Head:  Normocephalic, without obvious abnormality, atraumatic. Eyes:  Conjunctivae/corneas clear. Nose: Nares normal. Septum midline. Mucosa normal. No drainage or sinus tenderness.    Throat: Lips, mucosa, and tongue normal. Teeth and gums normal.   Neck: Supple, symmetrical, trachea midline, no adenopathy, thyroid: no enlargment/tenderness/nodules, no carotid bruit and no JVD. Back:   Symmetric, no curvature. ROM normal.   Lungs:   Clear to auscultation bilaterally. Chest wall:  No tenderness or deformity. Heart:  Regular rate and rhythm, S1, S2 normal, no murmur, click, rub or gallop. Abdomen:   Soft, non-tender. Bowel sounds normal. No masses,  No organomegaly. Extremities: Extremities normal, atraumatic, no cyanosis or edema. Pulses: 2+ and symmetric all extremities.    Skin: Skin color, texture, turgor normal. No rashes or lesions   Lymph nodes: Cervical, supraclavicular, and axillary nodes normal.   Neurologic: Grossly nonfocal     Data review:     Recent Results (from the past 24 hour(s))   GLUCOSE, POC    Collection Time: 06/08/18  4:32 PM   Result Value Ref Range    Glucose (POC) 224 (H) 70 - 110 mg/dL   GLUCOSE, POC    Collection Time: 06/08/18 10:43 PM   Result Value Ref Range    Glucose (POC) 113 (H) 70 - 673 mg/dL   METABOLIC PANEL, BASIC    Collection Time: 06/09/18  5:31 AM   Result Value Ref Range    Sodium 137 136 - 145 mmol/L    Potassium 4.0 3.5 - 5.5 mmol/L    Chloride 103 100 - 108 mmol/L    CO2 25 21 - 32 mmol/L    Anion gap 9 3.0 - 18 mmol/L    Glucose 119 (H) 74 - 99 mg/dL    BUN 4 (L) 7.0 - 18 MG/DL    Creatinine 0.73 0.6 - 1.3 MG/DL    BUN/Creatinine ratio 5 (L) 12 - 20      GFR est AA >60 >60 ml/min/1.73m2    GFR est non-AA >60 >60 ml/min/1.73m2    Calcium 8.4 (L) 8.5 - 10.1 MG/DL   MAGNESIUM    Collection Time: 06/09/18  5:31 AM   Result Value Ref Range    Magnesium 1.8 1.6 - 2.6 mg/dL   PHOSPHORUS    Collection Time: 06/09/18  5:31 AM   Result Value Ref Range    Phosphorus 3.8 2.5 - 4.9 MG/DL   CBC WITH AUTOMATED DIFF    Collection Time: 06/09/18  5:31 AM   Result Value Ref Range    WBC 3.5 (L) 4.6 - 13.2 K/uL    RBC 3.78 (L) 4.20 - 5.30 M/uL    HGB 10.8 (L) 12.0 - 16.0 g/dL    HCT 31.9 (L) 35.0 - 45.0 %    MCV 84.4 74.0 - 97.0 FL    MCH 28.6 24.0 - 34.0 PG    MCHC 33.9 31.0 - 37.0 g/dL    RDW 14.5 11.6 - 14.5 %    PLATELET 540 844 - 689 K/uL    MPV 9.1 (L) 9.2 - 11.8 FL    NEUTROPHILS 56 40 - 73 %    LYMPHOCYTES 23 21 - 52 %    MONOCYTES 20 (H) 3 - 10 %    EOSINOPHILS 1 0 - 5 %    BASOPHILS 0 0 - 2 %    ABS. NEUTROPHILS 2.0 1.8 - 8.0 K/UL    ABS. LYMPHOCYTES 0.8 (L) 0.9 - 3.6 K/UL    ABS. MONOCYTES 0.7 0.05 - 1.2 K/UL    ABS. EOSINOPHILS 0.0 0.0 - 0.4 K/UL    ABS. BASOPHILS 0.0 0.0 - 0.1 K/UL    DF AUTOMATED     GLUCOSE, POC    Collection Time: 06/09/18  7:58 AM   Result Value Ref Range    Glucose (POC) 136 (H) 70 - 110 mg/dL   GLUCOSE, POC    Collection Time: 06/09/18 11:57 AM   Result Value Ref Range    Glucose (POC) 180 (H) 70 - 110 mg/dL       Imaging:  I have personally reviewed the patients radiographs and have reviewed the reports:  CT chest reviewed. 1.5 cm RUL spiculated mass.   No associated adenopathy          Lambert Lim DO  Pulmonary Critical Care & Sleep Medicine

## 2018-06-10 NOTE — ROUTINE PROCESS
Report received from [address] [place] [address]. Patient is alert, in bed, daughter at the bedsdie. No distress noted. Call bell in reach.   Continue to monitor

## 2018-06-10 NOTE — ROUTINE PROCESS
Bedside and Verbal shift change report given to SHAHNAZ steven RN (oncoming nurse) by Marylen Hard, RN (offgoing nurse). Report included the following information SBAR, Kardex, MAR and Recent Results.     SITUATION:    Code Status: Full Code   Reason for Admission: Hematuria    [facility] day: 2   Problem List:       Hospital Problems  Date Reviewed: 5/9/2018          Codes Class Noted POA    * (Principal)Hematuria ICD-10-CM: R31.9  ICD-9-CM: 599.70  6/8/2018 Unknown        Hyponatremia ICD-10-CM: E87.1  ICD-9-CM: 276.1  6/8/2018 Unknown        Acute UTI ICD-10-CM: N39.0  ICD-9-CM: 599.0  6/8/2018 Unknown        Hypokalemia ICD-10-CM: E87.6  ICD-9-CM: 276.8  6/8/2018 Unknown        Weakness generalized ICD-10-CM: R53.1  ICD-9-CM: 780.79  6/8/2018 Unknown        Pulmonary nodule ICD-10-CM: R91.1  ICD-9-CM: 793.11  6/8/2018 Unknown        Type 2 diabetes mellitus with diabetic neuropathy ([facility] [address] ICD-10-CM: E11.40  ICD-9-CM: 250.60, 357.2  3/19/2018 Yes              BACKGROUND:    Past Medical History:   Past Medical History:   Diagnosis Date    Allergies     Diabetes mellitus ([facility] [address] 1/11/2010    Hearing loss 1/11/2010    HTN (hypertension) 1/11/2010    Hypercholesteremia 1/11/2010    Hypothyroidism 1/11/2010    Macular degeneration     shanae Millan     Left femural neck    Otosclerosis     Left ear    Panic attack     Thyroid disease     hypothyroid    Unspecified adverse effect of anesthesia     hard to wake up in the past. Had own blood transfusion and was better         Patient taking anticoagulants yes     ASSESSMENT:    Changes in Assessment Throughout Shift:      Patient has Central Line: no Reasons if yes:    Patient has Navarro Cath: no Reasons if yes:       Last Vitals:     Vitals:    06/09/18 1933 06/09/18 2328 06/10/18 0322 06/10/18 0428   BP: 139/78 126/74 143/66    Pulse: 64 73 69    Resp: 20 18 20    Temp: 98 °F (36.7 °C) 98.1 °F (36.7 °C) 97.8 °F (36.6 °C)    SpO2:  92% 97%    Weight:    75.2 kg (165 lb 12.8 oz)   Height:            IV and DRAINS (will only show if present)   [REMOVED] Peripheral IV 06/08/18 Left Arm-Site Assessment: Clean, dry, & intact  Peripheral IV 06/08/18 Right Wrist-Site Assessment: Clean, dry, & intact     WOUND (if present)   Wound Type:  none   Dressing present Dressing Present : No   Wound Concerns/Notes:  none     PAIN    Pain Assessment    Pain Intensity 1: 0 (06/10/18 0725)              Patient Stated Pain Goal: 0  o Interventions for Pain:  none  o Intervention effective: no  o Time of last intervention:    o Reassessment Completed: yes      Last 3 Weights:  Last 3 Recorded Weights in this Encounter    06/08/18 1006 06/09/18 0346 06/10/18 0428   Weight: 75.1 kg (165 lb 9.1 oz) 75.2 kg (165 lb 11.2 oz) 75.2 kg (165 lb 12.8 oz)     Weight change: 0.106 kg (3.8 oz)     INTAKE/OUPUT    Current Shift:      Last three shifts: 06/08 1901 - 06/10 0700  In: 2600 [I.V.:2600]  Out: 3650 [Urine:3650]     LAB RESULTS     Recent Labs      06/10/18   0239  06/09/18   0531  06/08/18   0345   WBC  4.4*  3.5*  5.5   HGB  10.1*  10.8*  11.8*   HCT  30.1*  31.9*  32.8*   PLT  251  242  229        Recent Labs      06/10/18   0239  06/09/18   0531  06/08/18   0345   NA  137  137  126*   K  3.5  4.0  3.3*   GLU  139*  119*  155*   BUN  3*  4*  8   CREA  0.75  0.73  0.86   CA  7.9*  8.4*  8.6   MG   --   1.8   --    INR   --    --   0.9       RECOMMENDATIONS AND DISCHARGE PLANNING     1. Pending tests/procedures/ Plan of Care or Other Needs:      2. Discharge plan for patient and Needs/Barriers:     3. Estimated Discharge Date: tbd Posted on Whiteboard in [facility]: yes      4. The patient's care plan was reviewed with the oncoming nurse.        \"HEALS\" SAFETY CHECK      Fall Risk    Total Score: 3    Safety Measures: Safety Measures: Bed/Chair-Wheels locked    A safety check occurred in the patient's room between off going nurse and oncoming nurse listed above.    The safety check included the below items  Area Items   H  High Alert Medications - Verify all high alert medication drips (heparin, PCA, etc.)   E  Equipment - Suction is set up for ALL patients (with fermin)  - Red plugs utilized for all equipment (IV pumps, etc.)  - WOWs wiped down at end of shift.  - Room stocked with oxygen, suction, and other unit-specific supplies   A  Alarms - Bed alarm is set for fall risk patients  - Ensure chair alarm is in place and activated if patient is up in a chair   L  Lines - Check IV for any infiltration  - Navarro bag is empty if patient has a Navarro   - Tubing and IV bags are labeled   S  Safety   - Room is clean, patient is clean, and equipment is clean. - Hallways are clear from equipment besides carts. - Fall bracelet on for fall risk patients  - Ensure room is clear and free of clutter  - Suction is set up for ALL patients (with fermin)  - Hallways are clear from equipment besides carts.    - Isolation precautions followed, supplies available outside room, sign posted     Verdia Gosselin, RN

## 2018-06-10 NOTE — PROGRESS NOTES
conducted an initial consultation and Spiritual Assessment for Angie Mckeon, who is a [de-identified] y.o.,female. Patients Primary Language is: [place]. According to the patients EMR Religion Affiliation is: [place]. The reason the Patient came to the hospital is:   Patient Active Problem List    Diagnosis Date Noted    Hematuria 06/08/2018    Hyponatremia 06/08/2018    Acute UTI 06/08/2018    Hypokalemia 06/08/2018    Weakness generalized 06/08/2018    Pulmonary nodule 06/08/2018    Type 2 diabetes mellitus with diabetic neuropathy ([facility] [address] 03/19/2018    Atrophic vaginitis 03/10/2017    Essential hypertension 11/07/2016    Type 2 diabetes mellitus without complication, without long-term current use of insulin ([facility] [address] 11/07/2016    Recurrent UTI 11/04/2016    Osteoarthrosis, unspecified whether generalized or localized, lower leg 11/03/2014    History of UTI 10/20/2014    RLS (restless legs syndrome) 06/06/2014    Left hip pain 06/06/2014    Vertigo 01/28/2011    HTN (hypertension) 01/11/2010    Hypothyroidism 01/11/2010    Hearing loss 01/11/2010    Hypercholesteremia 01/11/2010        The  provided the following Interventions:  Initiated a relationship of care and support. Listened empathically. Provided chaplaincy education. Provided information about Spiritual Care Services. Offered assurance of continued prayers on patient's behalf. Chart reviewed. The following outcomes where achieved:  Patient processed feeling about current hospitalization. Patient expressed gratitude for 's visit. Assessment:  Patient does not have any Mosque/cultural needs that will affect patients preferences in health care. There are no spiritual or Mosque issues which require intervention at this time. Plan:  Chaplains will continue to follow and will provide pastoral care on an as needed/requested basis.    recommends bedside caregivers page  on duty if patient shows signs of acute spiritual or emotional distress.         [address] [facility].   [phone]

## 2018-06-10 NOTE — DISCHARGE SUMMARY
Discharge Summary     Patient ID:  Maegan Garcia  [id number]  [de-identified] y.o.  1935  Body mass index is 28.46 kg/(m^2). PCP on record: Rosie Bowen NP    Admit date: 6/8/2018  Discharge date and time: 6/10/2018    Discharge Diagnoses:                                           1 gross hematuria   2 Bilateral hydronephrosis   3 Acute cystitis - POA   4 Pulmonary nodule new         Consults: Pulmonary/Intensive care and Urology          Hospital Course by problems:  - Admitted with Gross hematuria from acute cystitis - pseudomonas ( C&S Pending ) . Self limiting hematuria . CT scan suggested bilateral hydronephrosis possibly from bladder wall swelling and obstruction . Urology consulted . Advised to treat infection and conservative approach with follow up to their office . Now on Cipro po ( pending Sensitivity ) , may need to change antibiotics - Defer to urology /PCP as out patient . As patient and family does not want to stay in hospital from to day onwards . - Pulmonary nodule - needs out patient PET scan , Follow up with Pulmonary     - Case d/w patient's daughter in room with patient - post hospitalization management plan including follow ups d/w patient till their understanding .         =================================================================    New found Pulmonary nodule :    · Pulmonary recommendation \" PET scan - outpatient. One could consider going straight for biopsy, however she is a very anxious lady, therefore suspect planning a PET scan would be the best route for her. Further work-up based on PET results: easier site found to bx vs IR bx vs resection vs serial imaging  · Obtain [facility] records that apparently document a prior pulmonary nodule  · Outpatient PFT's \"    Close follow-up in pulmonary clinic.   Will sign off at this time, but will coordinate with the outpatient clinic for follow-up  ==========================================================    Patient seen and examined by me on discharge day. Pertinent Findings:  Patient is Alert Awake and oriented   HEENT - NAD    RS - Clear , no rales no rhonchi   CVS - regular rhythm and rate acceptable    abd - benign, BS present , no Distension   EXT - no edema , no calf tenderness   Neuro - alert and awake , grossly motor and sensory intact       Significant Diagnostic Studies:  CT scan of chest, without intravenous contrast:           INDICATION:     The patient presented with back pain, recurrent UTI, abdominal pain, and  dizziness.     History of pulmonary nodule.     CT scan of chest is being done for evaluation of pulmonary nodule.           TECHNIQUE:     Multidetector CT scan with 5 mm size thickness, without intravenous contrast,  including chest and subphrenic levels.     Coronal and sagittal images are reformatted.     All CT scans at this facility are performed using dose optimization technique as  appropriate to a  performed  examination, to include automated exposer control,  adjustment mA and / or  KV according to patient size (including appropriate  matching  for site specific examination), or use  of iterative  reconstruction  technique.     COMPARISON STUDY: Portable chest x-ray on 4/29/2018.     No previous CT scan of chest is available for comparison. Comparison is being  made with the CT images of base of lungs obtained with CT scan of abdomen on  6/8/2018.           FINDINGS:           Mediastinum and pulmonary stephan:  No diagnostic finding at the thoracic inlet. In the right paratracheal area and  aortopulmonary window area there are a few subcentimeter lymph nodes, which are  nonspecific. No obvious subcarinal lymphadenopathy. Ascending thoracic aorta is  ectatic with a diameter of 3.88 cm. Dense calcified plaques in the coronary arteries, indicating CAD. No evidence of pericardial effusion or thickening.   No evidence of mediastinal mass or hematoma.     Pleural spaces:  No evidence of pleural effusion, obvious pleural plaque or calcification.     Bilateral lungs:     In the apical segment of right upper lobe there is a pulmonary nodule measuring  9.8 mm x 8.7 mm x 15.6 mm. the nodule appears to have spiculated outlines.     In the superior segment of right lower lobe as on image number 23 of series 3  there is a small pulmonary nodule, 3.1 mm in diameter.     There is no additional pulmonary nodule identified.     At the basal right middle lobe there are mild-to-moderate atelectatic changes  with fibrotic changes, similar to previous CT scan of abdomen. At the medial  aspect of basal right middle lobe there is focal wedge-shaped smaller density,  indicating old focal atelectasis, similar to previous CT scan of abdomen.     In left lower lingula there are mild atelectatic/fibrotic changes.     At the posterior bases of bilateral lower lobe there are minimal dependent  atelectatic changes left more than right.     Lungs are mildly hyperinflated.     ----------------------------------------------------     Thoracic bony structures:     There is no definable focal lesion in thoracic bony structures. Mild-to-moderate  multilevel spondylolytic changes in thoracic spine noted.     Subphrenic images:     Tail and body of pancreas are visualized and appear to be moderate to markedly  atrophic. Right adrenal gland appears to be normal. There is small left adrenal nodule  measuring 1.1 cm in diameter. On the subphrenic images in visualized upper and midportion of liver there are  findings of mild generalized fatty infiltration without definable focal lesion. Spleen appears to be of generous size measuring 12 cm SI, 1.6 cm AP and 8.3 cm  transverse.     -----------------------------------------------------------------------------     IMPRESSION  IMPRESSION:     In the apical segment of upper lobe of right lung there is a pulmonary nodule  measuring 9.8 mm x 8.7 mm x 15.6 mm with spiculated outlines.  Malignancy not  excluded. No previous CT scan of chest is available for comparison. For further evaluation, PET-CT scan, suggested.     Tiny nodule measuring 3.1 mm in size in right lower lobe, probably nonspecific.     Mild pulmonary emphysema and mild scattered interstitial fibrosis in lungs  without obvious bulla formation.     No evidence of pleural effusion or pneumothorax.     Chronic or subacute atelectasis in right middle lobe and base of left lower  lingula.     Small left adrenal nodule, 1.1 cm in diameter.     Additional findings, as above in body of report. CT abdomen and pelvis without contrast.     Indications: Flank pain, hematuria     Technique: No IV contrast administered. No oral contrast. Contiguous 5 mm axial  images obtained from above the liver to the symphysis pubis. Sagittal and  Coronal MPR generated.     CT scans at this facility are performed using dose optimization technique as  appropriate with performed exam, to include automated exposure control,  adjustment of mA and/or kV according to patient's size (including appropriate  matching for site-specific examinations), or use of iterative reconstruction  technique.     Comparison: Renal ultrasound December 2016     Findings:     Lower chest: No acute findings. Minor bandlike scarring/atelectasis and a few  bulla, partially imaged.     There is coronary atherosclerosis.     There is a small hiatal hernia.     Liver: Unremarkable.      Gallbladder/biliary: Unremarkable     Spleen: Unremarkable     Pancreas: Unremarkable.     Left Kidney: Moderately pronounced hydroureteronephrosis. Prominent distention  of extrarenal pelvis. No radiodense kidney stones. No perinephric inflammation.     Right Kidney: Also with moderately pronounced hydroureteronephrosis. Quite  prominent distention of extrarenal pelvis. No radiodense kidney stones.     Adrenals: Unremarkable.     Bowels/mesentery: No obstruction or mesenteric inflammation.   There are large  bowel diverticula particularly distally, without inflammation. An appendix is  not specifically identified.     Pelvis: There is inherent limitation in evaluation of the lower pelvis because  of bilateral hip replacement hardware creating substantial streak artifact. There is moderately prominent distention of the urinary bladder. There is some  mild surrounding haziness. There is a faint amount of slightly dense material  evident along the upper aspect, not the dependent area. The posterior inferior  portion of the bladder is not evaluated at all due to the streak artifact.      Visible uterus and adnexa unremarkable.     Vascular: Aorta unremarkable for age. IVC unremarkable.     Lymph Nodes: No adenopathy.     Large fatty ventral hernia without inflammation. Measures 8 x 7 x 4 cm, with a  nearly 3 cm wide neck.     Bones: Left convex and rotatory lumbar scoliosis. Multilevel spondylosis and  advanced lower lumbar facet arthropathy. The included hip replacement hardware  is grossly unremarkable.     IMPRESSION  IMPRESSION[de-identified]     1. Moderately pronounced bilateral hydroureteronephrosis. Accompanying  distention of the urinary bladder with mild perivesical haziness.  -This might simply reflect consequence of cystitis and urinary outflow  obstruction from bladder neck muscle spasm. However, there is rather limited  evaluation of the urinary bladder due to the presence of the bilateral hip  replacements. In light of this, cannot exclude the possibility of a lower  urinary bladder mass or clot. -Should consider supplemental evaluation with bladder ultrasound provided the  urinary bladder remains distended (affording acoustic window). 2. Large bowel diverticulosis.   3. Large fatty ventral hernia.     Thank you for this referral.       Pertinent Lab Data:  Recent Labs      06/10/18   0239  06/09/18   0531  06/08/18   0345   WBC  4.4*  3.5*  5.5   HGB  10.1*  10.8*  11.8*   HCT  30.1*  31.9*  32.8*   PLT  251 242  229     Recent Labs      06/10/18   0239  06/09/18   0531  06/08/18   0345   NA  137  137  126*   K  3.5  4.0  3.3*   CL  104  103  89*   CO2  25  25  26   GLU  139*  119*  155*   BUN  3*  4*  8   CREA  0.75  0.73  0.86   CA  7.9*  8.4*  8.6   MG   --   1.8   --    PHOS   --   3.8   --    INR   --    --   0.9       DISCHARGE MEDICATIONS:   @  Current Discharge Medication List      START taking these medications    Details   !! OTHER PLEASE CHECK URINE CULTURE AND SENSITIVITY - PENDING AT TIME OF DISCHARGE AT [facility]  Qty: 1 Act, Refills: 0      !! OTHER To PCP  - Check Urine culture results from [facility] - and change antibiotics as appropriate   - Patient has new Lung nodule - s/b [facility] - SHE NEEDS FOLLOW UP WITH THEM IN 1 -2 WEEKS  Qty: 1 Act, Refills: 0       !! - Potential duplicate medications found. Please discuss with provider. CONTINUE these medications which have CHANGED    Details   ciprofloxacin HCl (CIPRO) 500 mg tablet Take 1 Tab by mouth two (2) times a day. Qty: 10 Tab, Refills: 0         CONTINUE these medications which have NOT CHANGED    Details   losartan (COZAAR) 50 mg tablet TAKE 1 TABLET DAILY  Qty: 90 Tab, Refills: 1    Associated Diagnoses: Orthostatic hypotension      hydroCHLOROthiazide (HYDRODIURIL) 25 mg tablet TAKE 1 TABLET DAILY  Qty: 90 Tab, Refills: 1      escitalopram oxalate (LEXAPRO) 10 mg tablet TAKE 1 TABLET DAILY  Qty: 90 Tab, Refills: 2      metFORMIN (GLUCOPHAGE) 500 mg tablet Take 500 mg by mouth two (2) times daily (with meals). CRAN/VITC/MANNOSE/FOS/BROMELN (CYSTEX CRANBERRY PO) Take 1 Cap by mouth daily as needed. gabapentin (NEURONTIN) 300 mg capsule Take 3 Caps by mouth nightly.  Indications: Restless Legs Syndrome  Qty: 270 Cap, Refills: 2    Associated Diagnoses: RLS (restless legs syndrome)      levothyroxine (SYNTHROID) 100 mcg tablet TAKE 1 TABLET DAILY BEFORE BREAKFAST  Qty: 90 Tab, Refills: 3    Associated Diagnoses: Hypothyroidism, unspecified type               My Recommended Diet, Activity, Wound Care, and follow-up labs are listed in the patient's Discharge Insturctions which I have personally completed and reviewed. Disposition:     [x] Home with family     [] [facility] PT/RN   [] SNF/NH   [] Inpatient Rehab/GRIS  Condition at Discharge:  Stable    Follow up with:   PCP : Jurgen Lee NP      Please follow-up tests/labs that are still pendin.  None  2.    >30 minutes spent coordinating this discharge (review instructions/follow-up, prescriptions, preparing report for sign off)    Signed:  Robles Villagran MD  6/10/2018  10:45 AM

## 2018-06-11 NOTE — Clinical Note
Patient was admitted to [facility] on 6/8/18- 6/10/18 for gross hematuria r/t acute cystitis, bilateral hydronephrosis, & new pulmonary nodule.   Concerns: -Needs follow-up with pulmonary in 1-2 weeks -Needs PET scan for new pulmonary nodule -PCP please check urine cultures results from [facility] & change antibiotic as appropriate as per Dr. Martine Molina's discharge summary  -Blood glucose range 113-224 in hospital The patient is scheduled to see Popeye Rodriguez NP for [facility] 6/14/18 at 2:30pm.

## 2018-06-11 NOTE — PROGRESS NOTES
Hospital Discharge Follow-Up      Date/Time:  2018 12:39 PM    Patient was admitted [facility] on 18 and discharged on 6/10/18 for gross hematuria r/t acute cystitis, bilateral hydronephrosis, & new pulmonary nodule. The physician discharge summary was available at the time of outreach. Patient was contacted within 1 business days of discharge. Top Challenges reviewed with the provider   -Needs follow-up with pulmonary in 1-2 weeks  -Needs PET scan for new pulmonary nodule  -PCP please check urine cultures results from [facility] & change antibiotic as appropriate as per Dr. Georgette Molina's discharge summary   -Blood glucose range 113-224 in hospital       Method of communication with provider :chart routing    Inpatient RRAT score: 22  Was this a readmission? no     Nurse Navigator (NN) contacted the patient by telephone to perform post hospital discharge assessment. Verified name and  with patient as identifiers. Provided introduction to self, and explanation of the Nurse Navigator role. The patient states that she's feeling better, and denies hematuria, pain, burning with urination, fever or chills. Reviewed discharge instructions and red flags with patient who verbalized understanding. Patient given an opportunity to ask questions and does not have any further questions or concerns at this time. The patient agrees to contact the PCP office for questions related to their healthcare. NN provided contact information for future reference. Disease Specific:   N/A    Summary of patient's top problems:  1. New pulmonary nodule- needs PET scan & pulmonary follow-up in 1-2 weeks  2. [facility] urine culture needs to be reviewed by PCP  3.  Diabetes     Home Health orders at discharge: [address] ordered/company: n/a    Barriers to care? lack of knowledge about disease    Advance Care Planning:   Does patient have an Advance Directive:  reviewed and current Medication(s):     New Medications at Discharge: none  Changed Medications at Discharge:   ciprofloxacin HCl (CIPRO) 500 mg tablet Take 1 Tab by mouth two (2) times a day. Qty: 10 Tab, Refills: 0     Discontinued Medications at Discharge: none    Medication reconciliation was performed with patient, who verbalizes understanding of administration of home medications. There were no barriers to obtaining medications identified at this time. Referral to Pharm D needed: no     Current Outpatient Prescriptions   Medication Sig    ciprofloxacin HCl (CIPRO) 500 mg tablet Take 1 Tab by mouth two (2) times a day.  losartan (COZAAR) 50 mg tablet TAKE 1 TABLET DAILY    hydroCHLOROthiazide (HYDRODIURIL) 25 mg tablet TAKE 1 TABLET DAILY    escitalopram oxalate (LEXAPRO) 10 mg tablet TAKE 1 TABLET DAILY    metFORMIN (GLUCOPHAGE) 500 mg tablet Take 500 mg by mouth two (2) times daily (with meals).  gabapentin (NEURONTIN) 300 mg capsule Take 3 Caps by mouth nightly. Indications: Restless Legs Syndrome    levothyroxine (SYNTHROID) 100 mcg tablet TAKE 1 TABLET DAILY BEFORE BREAKFAST    OTHER PLEASE CHECK URINE CULTURE AND SENSITIVITY - PENDING AT TIME OF DISCHARGE AT [facility]    OTHER To PCP  - Check Urine culture results from [facility] - and change antibiotics as appropriate   - Patient has new Lung nodule - s/b [facility] - SHE NEEDS FOLLOW UP WITH THEM IN 1 -2 WEEKS    CRAN/VITC/MANNOSE/FOS/BROMELN (CYSTEX CRANBERRY PO) Take 1 Cap by mouth daily as needed. No current facility-administered medications for this visit. There are no discontinued medications. PCP/Specialist follow up: Future Appointments  Date Time Provider Lisha Osborne   6/13/2018 3:00 PM Montana Milian MD [facility]   6/14/2018 2:30 PM Jay Morin NP [facility]   The patient states that she was not aware of these appts.  She states that since she's symptom free currently she plans on cancelling her appt. With Dr. Jh Forrest, but states she'll see Torres this week for follow-up. Goals      Attends follow-up appointments as directed. Patient will attend all follow-up appointments as directed by 6/15/18         Understands red flags post discharge.             Patient will understand reportable sign/symptoms of urinary tract infection & hematuria by 6/15/18

## 2018-06-14 NOTE — PATIENT INSTRUCTIONS

## 2018-06-14 NOTE — PROGRESS NOTES
Chief Complaint   Patient presents with    Blood in Urine     hospital F/U     Patient is here today for Hospital F/U. Pt sts that she is feeling better and would like to discuss metformin due to side effects. 1. Have you been to the ER, urgent care clinic since your last visit? Hospitalized since your last visit? Yes [facility] due to blood in urine 6/10/18    2. Have you seen or consulted any other health care providers outside of the [address] [place] [address] since your last visit? Include any pap smears or colon screening. Yes Dr [facility].

## 2018-06-14 NOTE — PROGRESS NOTES
HISTORY OF PRESENT ILLNESS  Gerry Mccoy is a [de-identified] y.o. female. Patient presents for follow up hospital     HPI     Pt did some research since she is getting so many UTIs and does not know why. It is a listed side effect of Metformin and she would like to change medication. Admit date: 6/8/2018  Discharge date and time: 6/10/2018     Discharge Diagnoses:                                           1 gross hematuria   2 Bilateral hydronephrosis   3 Acute cystitis - POA   4 Pulmonary nodule new     Pt does not want to do a urine test here since she is seeing urology tomorrow. CONTINUE these medications which have CHANGED     Details   ciprofloxacin HCl (CIPRO) 500 mg tablet Take 1 Tab by mouth two (2) times a day. Qty: 10 Tab, Refills: 0                CONTINUE these medications which have NOT CHANGED     Details   losartan (COZAAR) 50 mg tablet TAKE 1 TABLET DAILY  Qty: 90 Tab, Refills: 1     Associated Diagnoses: Orthostatic hypotension       hydroCHLOROthiazide (HYDRODIURIL) 25 mg tablet TAKE 1 TABLET DAILY  Qty: 90 Tab, Refills: 1       escitalopram oxalate (LEXAPRO) 10 mg tablet TAKE 1 TABLET DAILY  Qty: 90 Tab, Refills: 2       metFORMIN (GLUCOPHAGE) 500 mg tablet Take 500 mg by mouth two (2) times daily (with meals).       CRAN/VITC/MANNOSE/FOS/BROMELN (CYSTEX CRANBERRY PO) Take 1 Cap by mouth daily as needed.       gabapentin (NEURONTIN) 300 mg capsule Take 3 Caps by mouth nightly. Indications: Restless Legs Syndrome  Qty: 270 Cap, Refills: 2     Associated Diagnoses: RLS (restless legs syndrome)       levothyroxine (SYNTHROID) 100 mcg tablet TAKE 1 TABLET DAILY BEFORE BREAKFAST  Qty: 90 Tab, Refills: 3     Associated Diagnoses: Hypothyroidism, unspecified type                    My Recommended Diet, Activity, Wound Care, and follow-up labs are listed in the patient's Discharge Insturctions which I have personally completed and reviewed      Review of Systems   Constitutional: Positive for malaise/fatigue. Negative for chills and fever. HENT: Negative. Respiratory: Negative. Cardiovascular: Negative. Gastrointestinal: Negative. Genitourinary: Positive for dysuria, flank pain, frequency and urgency. Skin: Negative. Visit Vitals    /87 (BP 1 Location: Left arm, BP Patient Position: Sitting)    Pulse 88    Temp 98.5 °F (36.9 °C) (Oral)    Resp 16    Ht 5' 4\" (1.626 m)    Wt 161 lb 12.8 oz (73.4 kg)    SpO2 96%    BMI 27.77 kg/m2       Physical Exam   Constitutional: She is oriented to person, place, and time. She appears well-developed. No distress. Eyes: Conjunctivae and EOM are normal. Pupils are equal, round, and reactive to light. Neck: Normal range of motion. Neck supple. Cardiovascular: Normal rate, regular rhythm and normal heart sounds. No murmur heard. Pulmonary/Chest: Effort normal and breath sounds normal. No respiratory distress. She has no wheezes. She has no rales. Neurological: She is alert and oriented to person, place, and time. No cranial nerve deficit. Skin: Skin is warm. No erythema. Psychiatric: She has a normal mood and affect. Her behavior is normal. Thought content normal.       ASSESSMENT and PLAN  Diagnoses and all orders for this visit:    Hospital discharge follow-up    Type 2 diabetes mellitus without complication, without long-term current use of insulin (HCC)  -     glipiZIDE (GLUCOTROL) 10 mg tablet; Take 1 Tab by mouth two (2) times a day., Normal, Disp-60 Tab, R-2    History of acute cystitis    Pulmonary nodule  -     [facility]        PLAN:  I reviewed hospital notes, labs and imaging studies. We discussed her request to change Metformin so we will switch to Glipuride 10mg bid    Pt has an appt with urology tomorrow.     Pt was given after visit galindo

## 2018-06-14 NOTE — PROGRESS NOTES
Goals Addressed             Most Recent     Attends follow-up appointments as directed.    On track (6/14/2018)             Patient will attend all follow-up appointments as directed by 6/15/18            Patient attended hospital follow-up with [facility] NP.

## 2018-06-14 NOTE — MR AVS SNAPSHOT
[address] [facility] 
 
 
 [address] [facility] [id number] [address] [address] 
[phone] Patient: Lizbeth Huertas MRN:  [id number] Visit Information Date & Time Provider Department Dept. Phone Encounter #  
 2018  2:30 PM NANNETTE Henning [address] [id number] Your Appointments 2018  2:30 PM  
XRAY Visit with Olayinka Polanco [facility] Appt Note: cysto bilateral retrograde  [facility] [address] V[place] [address]  
  
   
 [address] [place] [address] 23[address] [facility] 100 200 [facility] 2018  2:30 PM  
Any with Marcela Joshua MD  
[facility] ([facility] Appt Note: cysto bilateral retrograde  [facility] [address] V[place] [address]  
  
   
  [facility] [address] [facility] 100 200 [facility] Se Upcoming Health Maintenance Date Due  
 BREAST CANCER SCRN MAMMOGRAM 2018 Influenza Age 5 to Adult 2018* MICROALBUMIN Q1 2018 HEMOGLOBIN A1C Q6M 2018 LIPID PANEL Q1 2018 EYE EXAM RETINAL OR DILATED Q1 2019 FOOT EXAM Q1 3/19/2019 MEDICARE YEARLY EXAM 5/10/2019 GLAUCOMA SCREENING Q2Y 2020 DTaP/Tdap/Td series (2 - Td) 2027 *Topic was postponed. The date shown is not the original due date. Allergies as of 2018  Review Complete On: 2018 By: Gabriel Tay LPN Severity Noted Reaction Type Reactions Phenergan [Promethazine] High 2014   Side Effect Other (comments) Made patient very hyper and extremely agitated Current Immunizations  Reviewed on 2016 Name Date Influenza Vaccine PF 2013 Influenza Vaccine Split 10/7/2011 Not reviewed this visit You Were Diagnosed With   
  
 Codes Comments Hospital discharge follow-up    -  Primary ICD-10-CM: [facility] ICD-9-CM: V67.59 Type 2 diabetes mellitus without complication, without long-term current use of insulin (HCC)     ICD-10-CM: E11.9 ICD-9-CM: 250.00 History of acute cystitis     ICD-10-CM: Z87.440 ICD-9-CM: V13.02 Vitals BP Pulse Temp Resp Height(growth percentile) Weight(growth percentile) 140/87 (BP 1 Location: Left arm, BP Patient Position: Sitting) 88 98.5 °F (36.9 °C) (Oral) 16 5' 4\" (1.626 m) 161 lb 12.8 oz (73.4 kg) SpO2 BMI OB Status Smoking Status 96% 27.77 kg/m2 Postmenopausal Former Smoker Vitals History BMI and BSA Data Body Mass Index Body Surface Area  
 27.77 kg/m 2 1.82 m 2 Preferred Pharmacy Pharmacy Name Phone [facility], [address] [place] [address] [phone] Your Updated Medication List  
  
   
This list is accurate as of 6/14/18  2:39 PM.  Always use your most recent med list.  
  
  
  
  
 ciprofloxacin HCl 500 mg tablet Commonly known as:  CIPRO Take 1 Tab by mouth two (2) times a day. CYSTEX CRANBERRY PO Take 1 Cap by mouth daily as needed. escitalopram oxalate 10 mg tablet Commonly known as:  Denisa Yung TAKE 1 TABLET DAILY  
  
 gabapentin 300 mg capsule Commonly known as:  NEURONTIN Take 3 Caps by mouth nightly. Indications: Restless Legs Syndrome  
  
 glipiZIDE 10 mg tablet Commonly known as:  Lawayne Early Take 1 Tab by mouth two (2) times a day. hydroCHLOROthiazide 25 mg tablet Commonly known as:  HYDRODIURIL  
TAKE 1 TABLET DAILY  
  
 levothyroxine 100 mcg tablet Commonly known as:  SYNTHROID  
TAKE 1 TABLET DAILY BEFORE BREAKFAST  
  
 losartan 50 mg tablet Commonly known as:  COZAAR  
TAKE 1 TABLET DAILY  
  
 metFORMIN 500 mg tablet Commonly known as:  GLUCOPHAGE Take 500 mg by mouth two (2) times daily (with meals). nitrofurantoin 50 mg capsule Commonly known as:  MACRODANTIN  
  
 OTHER  
PLEASE CHECK URINE CULTURE AND SENSITIVITY - PENDING AT TIME OF DISCHARGE AT [facility] OTHER To PCP - Check Urine culture results from [facility] - and change antibiotics as appropriate  - Patient has new Lung nodule - s/b [facility] - SHE NEEDS FOLLOW UP WITH THEM IN 1 -2 WEEKS  
  
 PREMARIN 0.625 mg/gram vaginal cream  
Generic drug:  conjugated estrogens PLEASE APPLY A PEA SIZED AMOUNT VAGINALLY AT BEDTIME FOR 30 DAYS, THEN TWICE WEEKLY Prescriptions Sent to Pharmacy Refills  
 glipiZIDE (GLUCOTROL) 10 mg tablet 2 Sig: Take 1 Tab by mouth two (2) times a day. Class: Normal  
 Pharmacy: [id number] [address] [facility], [address] [place] [address] #: [phone] Route: Oral  
  
Patient Instructions A Healthy Lifestyle: Care Instructions Your Care Instructions A healthy lifestyle can help you feel good, stay at a healthy weight, and have plenty of energy for both work and play. A healthy lifestyle is something you can share with your whole family. A healthy lifestyle also can lower your risk for serious health problems, such as high blood pressure, heart disease, and diabetes. You can follow a few steps listed below to improve your health and the health of your family. Follow-up care is a key part of your treatment and safety. Be sure to make and go to all appointments, and call your doctor if you are having problems. It's also a good idea to know your test results and keep a list of the medicines you take. How can you care for yourself at home? · Do not eat too much sugar, fat, or fast foods. You can still have dessert and treats now and then. The goal is moderation. · Start small to improve your eating habits. Pay attention to portion sizes, drink less juice and soda pop, and eat more fruits and vegetables. ¨ Eat a healthy amount of food.  A 3-ounce serving of meat, for example, is about the size of a deck of cards. Fill the rest of your plate with vegetables and whole grains. ¨ Limit the amount of soda and sports drinks you have every day. Drink more water when you are thirsty. ¨ Eat at least 5 servings of fruits and vegetables every day. It may seem like a lot, but it is not hard to reach this goal. A serving or helping is 1 piece of fruit, 1 cup of vegetables, or 2 cups of leafy, raw vegetables. Have an apple or some carrot sticks as an afternoon snack instead of a candy bar. Try to have fruits and/or vegetables at every meal. 
· Make exercise part of your daily routine. You may want to start with simple activities, such as walking, bicycling, or slow swimming. Try to be active 30 to 60 minutes every day. You do not need to do all 30 to 60 minutes all at once. For example, you can exercise 3 times a day for 10 or 20 minutes. Moderate exercise is safe for most people, but it is always a good idea to talk to your doctor before starting an exercise program. 
· Keep moving. Cecelia Feeler the lawn, work in the garden, or [employer]. Take the stairs instead of the elevator at work. · If you smoke, quit. People who smoke have an increased risk for heart attack, stroke, cancer, and other lung illnesses. Quitting is hard, but there are ways to boost your chance of quitting tobacco for good. ¨ Use nicotine gum, patches, or lozenges. ¨ Ask your doctor about stop-smoking programs and medicines. ¨ Keep trying. In addition to reducing your risk of diseases in the future, you will notice some benefits soon after you stop using tobacco. If you have shortness of breath or asthma symptoms, they will likely get better within a few weeks after you quit. · Limit how much alcohol you drink. Moderate amounts of alcohol (up to 2 drinks a day for men, 1 drink a day for women) are okay. But drinking too much can lead to liver problems, high blood pressure, and other health problems. Family health If you have a family, there are many things you can do together to improve your health. · Eat meals together as a family as often as possible. · Eat healthy foods. This includes fruits, vegetables, lean meats and dairy, and whole grains. · Include your family in your fitness plan. Most people think of activities such as jogging or tennis as the way to fitness, but there are many ways you and your family can be more active. Anything that makes you breathe hard and gets your heart pumping is exercise. Here are some tips: 
¨ Walk to do errands or to take your child to school or the bus. ¨ Go for a family bike ride after dinner instead of watching TV. Where can you learn more? Go to http://joey-andrey.info/. Enter D230 in the search box to learn more about \"A Healthy Lifestyle: Care Instructions. \" Current as of: May 12, 2017 Content Version: 11.4 © 9212-8483 [employer]. Care instructions adapted under license by [employer] (which disclaims liability or warranty for this information). If you have questions about a medical condition or this instruction, always ask your healthcare professional. [place] [address] any warranty or liability for your use of this information. Introducing hospitals & HEALTH SERVICES! Corie Sumner introduces "[employer] patient portal. Now you can access parts of your medical record, email your doctor's office, and request medication refills online. 1. In your internet browser, go to [email or web address] 2. Click on the First Time User? Click Here link in the Sign In box. You will see the New Member Sign Up page. 3. Enter your "[employer] Access Code exactly as it appears below. You will not need to use this code after youve completed the sign-up process. If you do not sign up before the expiration date, you must request a new code. · "[employer] Access Code: [email or web address] Expires: 7/28/2018  5:36 PM 
 
 4. Enter the last four digits of your Social Security Number (xxxx) and Date of Birth (mm/dd/yyyy) as indicated and click Submit. You will be taken to the next sign-up page. 5. Create a PanAtlanta ID. This will be your [employer] login ID and cannot be changed, so think of one that is secure and easy to remember. 6. Create a [employer] password. You can change your password at any time. 7. Enter your Password Reset Question and Answer. This can be used at a later time if you forget your password. 8. Enter your e-mail address. You will receive e-mail notification when new information is available in [address] 9. Click Sign Up. You can now view and download portions of your medical record. 10. Click the Download Summary menu link to download a portable copy of your medical information. If you have questions, please visit the Frequently Asked Questions section of the [employer] website. Remember, [employer] is NOT to be used for urgent needs. For medical emergencies, dial 911. Now available from your iPhone and Android! Please provide this summary of care documentation to your next provider. Your primary care clinician is listed as [facility]. If you have any questions after today's visit, please call [phone].

## 2018-06-17 PROBLEM — E11.21 TYPE 2 DIABETES WITH NEPHROPATHY (HCC): Status: ACTIVE | Noted: 2018-01-01

## 2018-06-19 NOTE — Clinical Note
Whit Guerrero, This is a patient of Kimis, but you saw her last week. Just an FYI, she refused pulmonary follow-up (for new pulmonary nodule) at this time. She states that she can only handle one problem at a time. She's currently getting daily gentamycin injections by Dr. Oskar Sandoval office for her recurrent UTIs.

## 2018-06-19 NOTE — PROGRESS NOTES
Transitions of Care Coordination  Follow-Up    Date/Time:  6/19/2018 12:39 PM     NN contacted patient for Transitions of Care Coordination  follow up. Spoke to patient. Introduced self/role and reason for call. Patient reported: \"I'm ok. I think the injection that I'm going to get from Dr. Trace Miller is going to do the trick\" (recurrent UTIs). When asked if she had been called to schedule an appt. With pulmonary as per referral for new pulmonary nodule the patient stated \"I can only handle one problem at a time. \" Refused pulmonary appt. At this time. Pertinent negatives: hematuria, pain, burning with urination, fever or chills. Medications:   New medications since last outreach: yes, gentamycin injections daily for 5 days by Dr. Janelle Moreno office   Does patient need refills on any medications: no  Medication changes since last outreach (dose adjustments or discontinued meds): no    Home Health orders at discharge: none     Barriers to care? lack of knowledge about disease    Specialist appointments since last outreach? yes  If so, specialist and date: Dr. Janelle Moreno nurse, urology on 6/18/18 for injection    Patient reminded that there are physicians on call 24 hours a day / 7 days a week (M-F 5pm to 8am and from Friday 5pm until Monday 8a for the weekend) should the patient have questions or concerns. Future Appointments  Date Time Provider Lisha Osborne   6/19/2018 1:40 PM [facility] NURSE [facility] RICHARD SCHED   6/20/2018 1:20 PM [facility] NURSE [facility] RICHARD SCHED   6/21/2018 10:50 AM [facility] NURSE [facility] RICHARD SCHED   6/22/2018 1:00 PM Navdeep Chowdary   7/16/2018 2:30 PM MD Francesca Duque   7/16/2018 2:30 PM Ray Cai    Patient aware of these appts. Goals      Attends follow-up appointments as directed.             Patient will attend all follow-up appointments as directed by 6/15/18         Understands red flags post discharge.             Patient will understand reportable sign/symptoms of urinary tract infection & hematuria by 6/15/18

## 2018-06-27 NOTE — PROGRESS NOTES
Transitions of Care Coordination  Follow-Up    Date/Time:  6/27/2018 12:33 PM     NN contacted patient for Transitions of Care Coordination  follow up. Spoke to patient. Introduced self/role and reason for call. Patient reported: \"I think that the medication (gentamycin injections by Dr. Brooklyn Champagne office) they gave me is working. I feel very good. \" The patient was pleasantly abrupt with NN and cut off conversation quickly. Pertinent negatives: hematuria, pain, burning with urination, fever or chills  *Continues to refuse pulmonary appt. At this time as per referral for new pulmonary nodule. Medications:   New medications since last outreach: no  Does patient need refills on any medications: no  Medication changes since last outreach (dose adjustments or discontinued meds): no    Home Health orders at discharge: none      Barriers to care? lack of knowledge about disease     Specialist appointments since last outreach? yes  If so, specialist and date: Dr. Brooklyn Champagne nurse, urology on 6/19-6/22/18 for Gentamycin injection    Patient reminded that there are physicians on call 24 hours a day / 7 days a week (M-F 5pm to 8am and from Friday 5pm until Monday 8a for the weekend) should the patient have questions or concerns. Future Appointments  Date Time Provider Department Center   7/16/2018 2:30 PM [facility]   7/16/2018 2:30 PM [facility] BR[facility]O Flores 71 Attends follow-up appointments as directed. Patient will attend all follow-up appointments as directed by 6/15/18         Understands red flags post discharge.             Patient will understand reportable sign/symptoms of urinary tract infection & hematuria by 6/15/18

## 2018-07-05 NOTE — ED NOTES
Daughter at bedside. Stated patient was confused this morning and acting \"child like. \" Was restless and anxious. Daughter gave 0.5mgs of Ativan at 0600. Has chronic UTIs.

## 2018-07-05 NOTE — DISCHARGE INSTRUCTIONS
Cystoscopy: Before Your Procedure  What is a cystoscopy? A cystoscopy is a procedure that lets a doctor look inside your bladder and urethra. The urethra is the tube that carries urine from the bladder to outside the body. The doctor uses a thin, lighted tool called a cystoscope. With this tool, he or she can look for kidney or bladder stones. The doctor can also look for tumors, bleeding, or infection. If you are in a clinic and you are awake, you will get gel to numb your urethra. This makes the procedure more comfortable. Then the doctor puts the tube into your urethra and moves it into your bladder. Next, the doctor fills your bladder with liquid. This helps him or her see better. It may cause you to feel pressure in your bladder area for a short time. If you are in the hospital, you may get medicine to make you sleep during the procedure. While you are asleep, the doctor can take samples of tissue. These will be checked for cancer and other problems. This is called a biopsy. If you have a biopsy, you may have a small amount of blood in your urine for several days. You may also need a catheter. It's a tube that drains urine from your bladder. Your doctor will take it out at your follow-up visit. Follow-up care is a key part of your treatment and safety. Be sure to make and go to all appointments, and call your doctor if you are having problems. It's also a good idea to know your test results and keep a list of the medicines you take. What happens before the procedure? ?Preparing for the procedure  ? · Understand exactly what procedure is planned, along with the risks, benefits, and other options. · Tell your doctors ALL the medicines, vitamins, supplements, and herbal remedies you take. Some of these can increase the risk of bleeding or interact with anesthesia. ? · If you take blood thinners, such as warfarin (Coumadin), clopidogrel (Plavix), or aspirin, be sure to talk to your doctor.  He or she will tell you if you should stop taking these medicines before your procedure. Make sure that you understand exactly what your doctor wants you to do.   ? · Your doctor will tell you which medicines to take or stop before your procedure. You may need to stop taking certain medicines a week or more before the procedure. So talk to your doctor as soon as you can.   ? · If you have an advance directive, let your doctor know. It may include a living will and a durable power of  for health care. Bring a copy to the hospital. If you don't have one, you may want to prepare one. It lets your doctor and loved ones know your health care wishes. Doctors advise that everyone prepare these papers before any type of surgery or procedure. Procedures can be stressful. This information will help you understand what you can expect. And it will help you safely prepare for your procedure. What happens on the day of the procedure? · Follow the instructions exactly about when to stop eating and drinking. If you don't, your procedure may be canceled. If your doctor told you to take your medicines on the day of your procedure, take them with only a sip of water. ? · Take a bath or shower before you come in for your procedure. Do not apply lotions, perfumes, deodorants, or nail polish. ? · Take off all jewelry and piercings. And take out contact lenses, if you wear them. ? At the hospital or surgery center   · Bring a picture ID. ? · You will be asked to empty your bladder just before the procedure. ? · You will be kept comfortable and safe by your anesthesia provider. The anesthesia may make you sleep. Or it may just numb the area being worked on. ? · In most cases, the cystoscope is in the bladder for less than 10 minutes. But the entire test may take up to 45 minutes or longer. Going home   · Be sure you have someone to drive you home. Anesthesia and pain medicine make it unsafe for you to drive.    ? · You will be given more specific instructions about recovering from your procedure. They will cover things like diet, wound care, follow-up care, driving, and getting back to your normal routine. When should you call your doctor? · You have questions or concerns. ? · You don't understand how to prepare for your procedure. ? · You become ill before the procedure (such as fever, flu, or a cold). ? · You need to reschedule or have changed your mind about having the procedure. Where can you learn more? Go to http://joey-andrey.info/. Enter W813 in the search box to learn more about \"Cystoscopy: Before Your Procedure. \"  Current as of: May 12, 2017  Content Version: 11.4  © 3988-2507 [employer]. Care instructions adapted under license by [employer] (which disclaims liability or warranty for this information). If you have questions about a medical condition or this instruction, always ask your healthcare professional. [place] [address] any warranty or liability for your use of this information. Blood in the Urine: Care Instructions  Your Care Instructions    Blood in the urine, or hematuria, may make the urine look red, brown, or pink. There may be blood every time you urinate or just from time to time. You cannot always see blood in the urine, but it will show up in a urine test.  Blood in the urine may be serious. It should always be checked by a doctor. Your doctor may recommend more tests, including an X-ray, a CT scan, or a cystoscopy (which lets a doctor look inside the urethra and bladder). Blood in the urine can be a sign of another problem. Common causes are bladder infections and kidney stones. An injury to your groin or your genital area can also cause bleeding in the urinary tract. Very hard exercise-such as running a marathon-can cause blood in the urine.  Blood in the urine can also be a sign of kidney disease or cancer in the bladder or kidney. Many cases of blood in the urine are caused by a harmless condition that runs in families. This is called benign familial hematuria. It does not need any treatment. Sometimes your urine may look red or brown even though it does not contain blood. For example, not getting enough fluids (dehydration), taking certain medicines, or having a liver problem can change the color of your urine. Eating foods such as beets, rhubarb, or blackberries or foods with red food coloring can make your urine look red or pink. Follow-up care is a key part of your treatment and safety. Be sure to make and go to all appointments, and call your doctor if you are having problems. It's also a good idea to know your test results and keep a list of the medicines you take. When should you call for help? Call your doctor now or seek immediate medical care if:  · You have symptoms of a urinary infection. For example:  ¨ You have pus in your urine. ¨ You have pain in your back just below your rib cage. This is called flank pain. ¨ You have a fever, chills, or body aches. ¨ It hurts to urinate. ¨ You have groin or belly pain. · You have more blood in your urine. Watch closely for changes in your health, and be sure to contact your doctor if:  · You have new urination problems. · You do not get better as expected. Where can you learn more? Go to http://joey-andrey.info/. Enter C098 in the search box to learn more about \"Blood in the Urine: Care Instructions. \"  Current as of: May 12, 2017  Content Version: 11.4  © 3731-2858 [employer]. Care instructions adapted under license by [employer] (which disclaims liability or warranty for this information). If you have questions about a medical condition or this instruction, always ask your healthcare professional. [address] any warranty or liability for your use of this information.          Nausea and Vomiting: Care Instructions  Your Care Instructions    When you are nauseated, you may feel weak and sweaty and notice a lot of saliva in your mouth. Nausea often leads to vomiting. Most of the time you do not need to worry about nausea and vomiting, but they can be signs of other illnesses. Two common causes of nausea and vomiting are stomach flu and food poisoning. Nausea and vomiting from viral stomach flu will usually start to improve within 24 hours. Nausea and vomiting from food poisoning may last from 12 to 48 hours. The doctor has checked you carefully, but problems can develop later. If you notice any problems or new symptoms, get medical treatment right away. Follow-up care is a key part of your treatment and safety. Be sure to make and go to all appointments, and call your doctor if you are having problems. It's also a good idea to know your test results and keep a list of the medicines you take. How can you care for yourself at home? · To prevent dehydration, drink plenty of fluids, enough so that your urine is light yellow or clear like water. Choose water and other caffeine-free clear liquids until you feel better. If you have kidney, heart, or liver disease and have to limit fluids, talk with your doctor before you increase the amount of fluids you drink. · Rest in bed until you feel better. · When you are able to eat, try clear soups, mild foods, and liquids until all symptoms are gone for 12 to 48 hours. Other good choices include dry toast, crackers, cooked cereal, and gelatin dessert, such as Jell-O. When should you call for help? Call 911 anytime you think you may need emergency care. For example, call if:  ? · You passed out (lost consciousness). ?Call your doctor now or seek immediate medical care if:  ? · You have symptoms of dehydration, such as:  ¨ Dry eyes and a dry mouth. ¨ Passing only a little dark urine.   ¨ Feeling thirstier than usual.   ? · You have new or worsening belly pain.   ? · You have a new or higher fever. ? · You vomit blood or what looks like coffee grounds. ? Watch closely for changes in your health, and be sure to contact your doctor if:  ? · You have ongoing nausea and vomiting. ? · Your vomiting is getting worse. ? · Your vomiting lasts longer than 2 days. ? · You are not getting better as expected. Where can you learn more? Go to http://joey-andrey.info/. Enter 25 [id number] in the search box to learn more about \"Nausea and Vomiting: Care Instructions. \"  Current as of: March 20, 2017  Content Version: 11.4  © 0370-3351 [employer]. Care instructions adapted under license by [employer] (which disclaims liability or warranty for this information). If you have questions about a medical condition or this instruction, always ask your healthcare professional. [address] any warranty or liability for your use of this information.

## 2018-07-05 NOTE — ED PROVIDER NOTES
EMERGENCY DEPARTMENT HISTORY AND PHYSICAL EXAM    7:31 AM      Date: 7/5/2018  Patient Name: Tory Berger    History of Presenting Illness     Chief Complaint   Patient presents with    Nausea    Vomiting    Abdominal Pain       History Provided By: Patient and EMS    Chief Complaint: abdominal pain, nausea, vomiting  Duration:  Days  Timing:  Intermittent, Worsening and Waxing and Waning  Location: generalized  Quality: Cramping and Dull  Severity: Moderate  Modifying Factors: home antiemetics make better   Associated Symptoms: decreased appetite, malaise       Additional History (Context): Tory Berger is a [de-identified] y.o. female with diabetes, hypertension, hyperlipidemia and hypothyroid and chronic UTIs on daily prophylaxis who presents with nausea, abdominal pain, single episode of emesis two days prior. States she feels like she ate something bad but has had continued symptoms for days which is abnormal. Has been complaint with her home meds and denies urinary symptoms. Denies f/c. States she can't remember the last time she stooled or if she is passing gas. Abdominal pain is cramping, nausea, and not localized to a single location. Denies other issues at this juncture. PCP: Adela Everett, NP    Current Facility-Administered Medications   Medication Dose Route Frequency Provider Last Rate Last Dose    sucralfate (CARAFATE) 100 mg/mL oral suspension 1 g  1 g Oral NOW Deepthi Banuelos MD         Current Outpatient Prescriptions   Medication Sig Dispense Refill    metoclopramide HCl (REGLAN) 5 mg tablet Take 1 Tab by mouth every six (6) hours as needed for Nausea for up to 10 days. 12 Tab 0    glipiZIDE (GLUCOTROL) 10 mg tablet Take 1 Tab by mouth two (2) times a day.  60 Tab 2    nitrofurantoin (MACRODANTIN) 50 mg capsule   0    PREMARIN 0.625 mg/gram vaginal cream PLEASE APPLY A PEA SIZED AMOUNT VAGINALLY AT BEDTIME FOR 30 DAYS, THEN TWICE WEEKLY  0    ciprofloxacin HCl (CIPRO) 500 mg tablet Take 1 Tab by mouth two (2) times a day. 10 Tab 0    OTHER PLEASE CHECK URINE CULTURE AND SENSITIVITY - PENDING AT TIME OF DISCHARGE AT [facility] 1 Act 0    OTHER To PCP  - Check Urine culture results from [facility] - and change antibiotics as appropriate   - Patient has new Lung nodule - s/b [facility] [place] [facility] - SHE NEEDS FOLLOW UP WITH THEM IN 1 -2 WEEKS 1 Act 0    losartan (COZAAR) 50 mg tablet TAKE 1 TABLET DAILY 90 Tab 1    hydroCHLOROthiazide (HYDRODIURIL) 25 mg tablet TAKE 1 TABLET DAILY 90 Tab 1    escitalopram oxalate (LEXAPRO) 10 mg tablet TAKE 1 TABLET DAILY 90 Tab 2    metFORMIN (GLUCOPHAGE) 500 mg tablet Take 500 mg by mouth two (2) times daily (with meals).  CRAN/VITC/MANNOSE/FOS/BROMELN (CYSTEX CRANBERRY PO) Take 1 Cap by mouth daily as needed.  gabapentin (NEURONTIN) 300 mg capsule Take 3 Caps by mouth nightly.  Indications: Restless Legs Syndrome 270 Cap 2    levothyroxine (SYNTHROID) 100 mcg tablet TAKE 1 TABLET DAILY BEFORE BREAKFAST 90 Tab 3       Past History     Past Medical History:  Past Medical History:   Diagnosis Date    Allergies     Diabetes mellitus ([address] 1/11/2010    Hearing loss 1/11/2010    HTN (hypertension) 1/11/2010    Hypercholesteremia 1/11/2010    Hypothyroidism 1/11/2010    Macular degeneration     shanae Rondon Fried     Left femural neck    Otosclerosis     Left ear    Panic attack     Thyroid disease     hypothyroid    Unspecified adverse effect of anesthesia     hard to wake up in the past. Had own blood transfusion and was better       Past Surgical History:  Past Surgical History:   Procedure Laterality Date    HX HEENT      Left stapedectomy 10/03    HX HEENT  7/2012    eye lids lifted Dr. Mone Valderrama    HX MALIGNANT SKIN LESION EXCISION  07/26/2013    Upper arm basal cell skin cancer removal    HX ORTHOPAEDIC      Right hip replacement 2004       Family History:  Family History   Problem Relation Age of Onset    Breast Cancer Sister 70    Heart Attack Father     Heart Attack Daughter 52 March 2018       Social History:  Social History   Substance Use Topics    Smoking status: Former Smoker     Packs/day: 1.00     Years: 20.00     Types: Cigarettes     Quit date: 8/27/1995    Smokeless tobacco: Never Used    Alcohol use No       Allergies: Allergies   Allergen Reactions    Phenergan [Promethazine] Other (comments)     Made patient very hyper and extremely agitated           Review of Systems     Review of Systems   Constitutional: Positive for appetite change. Negative for diaphoresis and fever. HENT: Negative for dental problem, sore throat, tinnitus, trouble swallowing and voice change. Eyes: Negative for photophobia. Respiratory: Negative for cough, chest tightness, shortness of breath, wheezing and stridor. Cardiovascular: Negative for chest pain, palpitations and leg swelling. Gastrointestinal: Positive for abdominal pain, nausea and vomiting. Negative for abdominal distention, constipation, diarrhea and rectal pain. Endocrine: Negative for cold intolerance and heat intolerance. Genitourinary: Positive for flank pain. Negative for difficulty urinating, dysuria, frequency and pelvic pain. Musculoskeletal: Negative for back pain and gait problem. Skin: Negative for color change and rash. Allergic/Immunologic: Negative for immunocompromised state. Neurological: Positive for light-headedness. Negative for dizziness, seizures, numbness and headaches. Hematological: Negative for adenopathy. Psychiatric/Behavioral: Negative for agitation. Physical Exam     Visit Vitals    /67    Pulse (!) 53    Temp 98.1 °F (36.7 °C)    Resp 13    Ht 5' 4\" (1.626 m)    Wt 73.9 kg (163 lb)    SpO2 96%    BMI 27.98 kg/m2       Physical Exam   Constitutional: She is oriented to person, place, and time. No distress. HENT:   Head: Normocephalic and atraumatic.    Mouth/Throat: Oropharynx is clear and moist. No oropharyngeal exudate. Eyes: Pupils are equal, round, and reactive to light. Right eye exhibits no discharge. Left eye exhibits no discharge. Neck: Normal range of motion. No JVD present. No tracheal deviation present. Cardiovascular: Normal rate, regular rhythm, normal heart sounds and intact distal pulses. Pulmonary/Chest: Effort normal and breath sounds normal. No stridor. No respiratory distress. She has no wheezes. She exhibits no tenderness. Abdominal: Soft. Bowel sounds are normal. She exhibits no distension. There is no tenderness. There is no guarding. Musculoskeletal: Normal range of motion. She exhibits no edema or tenderness. Neurological: She is alert and oriented to person, place, and time. Skin: Skin is warm and dry. No rash noted. She is not diaphoretic. No erythema. There is pallor. Psychiatric: She has a normal mood and affect. Nursing note and vitals reviewed.         Diagnostic Study Results     Labs -  Recent Results (from the past 12 hour(s))   EKG, 12 LEAD, INITIAL    Collection Time: 07/05/18  7:50 AM   Result Value Ref Range    Ventricular Rate 55 BPM    Atrial Rate 55 BPM    P-R Interval 174 ms    QRS Duration 78 ms    Q-T Interval 464 ms    QTC Calculation (Bezet) 443 ms    Calculated P Axis 90 degrees    Calculated R Axis -30 degrees    Calculated T Axis 20 degrees    Diagnosis       Sinus bradycardia  Left axis deviation  Minimal voltage criteria for LVH, may be normal variant  Abnormal ECG  When compared with ECG of 29-APR-2018 18:03,  Criteria for Inferior infarct are no longer present     CBC WITH AUTOMATED DIFF    Collection Time: 07/05/18  8:01 AM   Result Value Ref Range    WBC 4.6 4.6 - 13.2 K/uL    RBC 4.17 (L) 4.20 - 5.30 M/uL    HGB 12.2 12.0 - 16.0 g/dL    HCT 35.6 35.0 - 45.0 %    MCV 85.4 74.0 - 97.0 FL    MCH 29.3 24.0 - 34.0 PG    MCHC 34.3 31.0 - 37.0 g/dL    RDW 13.9 11.6 - 14.5 %    PLATELET 998 810 - 349 K/uL    MPV 9.0 (L) 9.2 - 11.8 FL NEUTROPHILS 62 40 - 73 %    LYMPHOCYTES 24 21 - 52 %    MONOCYTES 13 (H) 3 - 10 %    EOSINOPHILS 1 0 - 5 %    BASOPHILS 0 0 - 2 %    ABS. NEUTROPHILS 2.9 1.8 - 8.0 K/UL    ABS. LYMPHOCYTES 1.1 0.9 - 3.6 K/UL    ABS. MONOCYTES 0.6 0.05 - 1.2 K/UL    ABS. EOSINOPHILS 0.0 0.0 - 0.4 K/UL    ABS. BASOPHILS 0.0 0.0 - 0.06 K/UL    DF AUTOMATED     METABOLIC PANEL, COMPREHENSIVE    Collection Time: 07/05/18  8:01 AM   Result Value Ref Range    Sodium 135 (L) 136 - 145 mmol/L    Potassium 3.6 3.5 - 5.5 mmol/L    Chloride 101 100 - 108 mmol/L    CO2 26 21 - 32 mmol/L    Anion gap 8 3.0 - 18 mmol/L    Glucose 179 (H) 74 - 99 mg/dL    BUN 11 7.0 - 18 MG/DL    Creatinine 1.07 0.6 - 1.3 MG/DL    BUN/Creatinine ratio 10 (L) 12 - 20      GFR est AA 60 (L) >60 ml/min/1.73m2    GFR est non-AA 49 (L) >60 ml/min/1.73m2    Calcium 9.4 8.5 - 10.1 MG/DL    Bilirubin, total 0.5 0.2 - 1.0 MG/DL    ALT (SGPT) 18 13 - 56 U/L    AST (SGOT) 14 (L) 15 - 37 U/L    Alk.  phosphatase 74 45 - 117 U/L    Protein, total 7.4 6.4 - 8.2 g/dL    Albumin 3.6 3.4 - 5.0 g/dL    Globulin 3.8 2.0 - 4.0 g/dL    A-G Ratio 0.9 0.8 - 1.7     LIPASE    Collection Time: 07/05/18  8:01 AM   Result Value Ref Range    Lipase 167 73 - 393 U/L   CARDIAC PANEL,(CK, CKMB & TROPONIN)    Collection Time: 07/05/18  8:01 AM   Result Value Ref Range    CK 35 26 - 192 U/L    CK - MB <1.0 <3.6 ng/ml    CK-MB Index  0.0 - 4.0 %     CALCULATION NOT PERFORMED WHEN RESULT IS BELOW LINEAR LIMIT    Troponin-I, Qt. <0.02 0.0 - 0.045 NG/ML   POC LACTIC ACID    Collection Time: 07/05/18  8:18 AM   Result Value Ref Range    Lactic Acid (POC) 0.9 0.4 - 2.0 mmol/L   URINALYSIS W/ RFLX MICROSCOPIC    Collection Time: 07/05/18 10:03 AM   Result Value Ref Range    Color RED      Appearance BLOODY      Specific gravity 1.010 1.003 - 1.030      pH (UA) 6.0 5.0 - 8.0      Protein >300 (A) NEG mg/dL    Glucose NEGATIVE  NEG mg/dL    Ketone NEGATIVE  NEG mg/dL    Bilirubin NEGATIVE  NEG      Blood LARGE (A) NEG      Urobilinogen 0.2 0.2 - 1.0 EU/dL    Nitrites NEGATIVE  NEG      Leukocyte Esterase NEGATIVE  NEG     URINE MICROSCOPIC ONLY    Collection Time: 07/05/18 10:03 AM   Result Value Ref Range    WBC  0 - 4 /hpf     UNABLE TO QUANTITATE MICROSCOPIC PARAMETERS DUE TO EXCESSIVE RBCS    RBC TOO NUMEROUS TO COUNT 0 - 5 /hpf    Epithelial cells  0 - 5 /lpf     UNABLE TO QUANTITATE MICROSCOPIC PARAMETERS DUE TO EXCESSIVE RBCS    Bacteria (A) NEG /hpf     UNABLE TO QUANTITATE MICROSCOPIC PARAMETERS DUE TO EXCESSIVE RBCS    Other:        Macroscopic performed on spun urine due to gross blood  or mucus       Radiologic Studies -   CT ABD PELV W CONT   Final Result            Medical Decision Making   I am the first provider for this patient. I reviewed the vital signs, available nursing notes, past medical history, past surgical history, family history and social history. Vital Signs-Reviewed the patient's vital signs. Pulse Oximetry Analysis -  98 on room air (Interpretation) Normal     Cardiac Monitor:  Rate: 54  Rhythm:  Sinus bradycardia     EKG: Interpreted by the EP. Time Interpreted: 0750   Rate: 55   Rhythm: sinus bradycardia   Interpretation: LAD, sinus bradycardia, normal intervals, T wave fl;attening in III otherwise grossly normal ECG   Comparison: 4/29/18    Records Reviewed: Nursing Notes, Old Medical Records, Previous electrocardiograms, Previous Radiology Studies and Previous Laboratory Studies (Time of Review: 7:31 AM)    ED Course: Progress Notes, Reevaluation, and Consults:  Provider Notes (Medical Decision Making): [de-identified] F with history of recurrent UTIs as well as diabetes presenting with abdominal discomfort, nausea, and malaise. Benign abdominal exam without local findings though negative lactate and chronic time course - do not suspect acute mesenteric ischemia.      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 Will eval with labs and CT ABD/PEL with contrast to look for SBO, other etiologies, etc. Patient afebrile, stable, and improved with antiemetics throughout evaluation. Labs unremarkable, though U/A pending. U/A negative for LE and nitrates. Sent for culture. CT without significant change from prior. Patient's symptoms improved with reglan and sucralfate. Suspect gastritis/component of partial SBO/slow transit given mass effect from kidneys. Do not suspect acute life threatening etiology of symptoms given workup as well as resolution with medications and food. Will d/c with symptomatic treatment. Patient with uro follow up later this month, stable for discharge at this time. Message sent to urologist to [facility] about this visit. Diagnosis     Clinical Impression:   1. Hematuria, unspecified type    2. Non-intractable vomiting with nausea, unspecified vomiting type        Disposition: Discharge Home     Follow-up Information     Follow up With Details Comments 1200 Marc Borrego NP Call today  [facility]      Avis Garcia MD Go to your scheduled appointment  [address] [place] [address]  [phone]             Patient's Medications   Start Taking    METOCLOPRAMIDE HCL (REGLAN) 5 MG TABLET    Take 1 Tab by mouth every six (6) hours as needed for Nausea for up to 10 days. Continue Taking    CIPROFLOXACIN HCL (CIPRO) 500 MG TABLET    Take 1 Tab by mouth two (2) times a day. CRAN/VITC/MANNOSE/FOS/BROMELN (CYSTEX CRANBERRY PO)    Take 1 Cap by mouth daily as needed. ESCITALOPRAM OXALATE (LEXAPRO) 10 MG TABLET    TAKE 1 TABLET DAILY    GABAPENTIN (NEURONTIN) 300 MG CAPSULE    Take 3 Caps by mouth nightly. Indications: Restless Legs Syndrome    GLIPIZIDE (GLUCOTROL) 10 MG TABLET    Take 1 Tab by mouth two (2) times a day.     HYDROCHLOROTHIAZIDE (HYDRODIURIL) 25 MG TABLET    TAKE 1 TABLET DAILY LEVOTHYROXINE (SYNTHROID) 100 MCG TABLET    TAKE 1 TABLET DAILY BEFORE BREAKFAST    LOSARTAN (COZAAR) 50 MG TABLET    TAKE 1 TABLET DAILY    METFORMIN (GLUCOPHAGE) 500 MG TABLET    Take 500 mg by mouth two (2) times daily (with meals).     NITROFURANTOIN (MACRODANTIN) 50 MG CAPSULE        OTHER    PLEASE CHECK URINE CULTURE AND SENSITIVITY - PENDING AT TIME OF DISCHARGE AT [facility]    OTHER    To PCP  - Check Urine culture results from [facility] - and change antibiotics as appropriate   - Patient has new Lung nodule - s/b [facility] [place] [facility] - SHE NEEDS FOLLOW UP WITH THEM IN 1 -2 WEEKS    PREMARIN 0.625 MG/GRAM VAGINAL CREAM    PLEASE APPLY A PEA SIZED AMOUNT VAGINALLY AT BEDTIME FOR 30 DAYS, THEN TWICE WEEKLY   These Medications have changed    No medications on file   Stop Taking    No medications on file     _______________________________

## 2018-07-05 NOTE — ED TRIAGE NOTES
Pt complaining of N/V and abdominal pain x 3 days. Brought via medic from home. Pt is A&Ox4 and ambulatory unassisted. Allergy to Phenergan. Pt states she only vomited the first day. But has since experienced increase nausea, and abdominal pain.

## 2018-07-05 NOTE — ED NOTES
Given saltine crackers and juice. After eating and drinking, stated feels better and much less anxious. Incontinent of bloody urine. Home in 2 gowns to wear.

## 2018-07-06 NOTE — PROGRESS NOTES
ED Discharge Follow-Up    Date/Time:  7/6/2018 11:05 AM    Patient presented to [facility]  ED on 7/5/18 for abd pain, hematuria, decreased appetite, n/v, & flank pain. Top Challenges reviewed with the provider   -Patient states \"I feel terrible today. I'm so sick of my stomach. \" Patient crying on phone.   -Refused PCP follow-up & going back to ED- ask per patient request Ernst Matta, daughter was called- left message to call pt. Nurse Navigator(NN) contacted the patient & daughter by telephone to perform post ED discharge assessment. Verified 2 patient identifiers. Provided introduction to self, and explanation of the Nurse Navigator role. Contact within 1 business day(s) of discharge. Subjective data: \"I feel terrible today. I'm so sick to my stomach. \" Patient crying on phone. She states that she doesn't want to go back to hospital again. -Refused NN calling 911. Daughter returned NN's call- stated: She's had chronic 2 yrs. Of UTIs. She's been seen by Dr. Joshua Delarosa, Dr. Elliot Ash, and two providers in your office for this. I don't know what else I can do. I think if we can get her something for nausea in the meantime while she gets seen it will help. \" She reports that the reglan 5mg & zofran were not effective. NN spoke to Katherine Sutherland NP- recommends for patient to take 2 tabs of reglan 5mg and to take 1/2 tab of Lexapro 10mg to reduce risk of serotonin syndrome. Daughter notified, and verbalized understanding. She states that the patient has been so nauseous that she hasn't been able to take her Lexapro anyway. Pertinent negatives: unknown she was too upset on phone    Medication:   New medications at discharge include:         METOCLOPRAMIDE HCL (REGLAN) 5 MG TABLET    Take 1 Tab by mouth every six (6) hours as needed for Nausea for up to 10 days.      Taking medication(s) prescribed at discharge: unknown- patient not feeling well & too upset  Medication changes (dose adjustments or discontinued meds): none    Patient reminded that there are physicians on call 24 hours a day / 7 days a week (M-F 5pm to 8am and from Friday 5pm until Monday 8a for the weekend) should the patient have questions or concerns. NN provided contact information for future reference. NN advised daughter to have the patient return to ED if symptoms worsen. Offered follow up appointment with PCP: yes, refused she states \"I can't go out anywhere now, call my daughter. \" NN left message for Rajendra Estrada, daughter to call her mother. NN also left NN's contact info. should daughter like to reach NN.      -While on hpone with daughter NN attempted to schedule a follow-up appt. With PCP or any other provider, but there's no appts. Available for next week. NN advised daughter to call Dr. Sharon Naidu office to try to get an earlier appt. Future Appointments  Date Time Provider Department Center   7/16/2018 2:30 PM Davin Chang MD [address]   7/16/2018 2:30 PM Ray Cai     NN had discussion with daughter regarding long-term care Medicaid options for home-based assistance, and/or [facility]. Instructed daughter to call [email or web address] to request screening & to apply for long-term care Medicaid if interested.

## 2018-07-09 NOTE — PROGRESS NOTES
NN briefly spoke to patient, who states that she felt better yesterday, but the nausea returned today. The patient states that her daughter called Dr. Latisha De La Cruz office to request an antiemetic. The patient expects her daughter later today to check on her. NN encouraged follow-up with PCP- patient verbalized understanding, but did not sched. An appt. NN also encouraged her or to have her daughter call us with questions or concerns.      Future Appointments  Date Time Provider Lisha Osborne   7/16/2018 2:30 PM MD Francesca Cruz   7/16/2018 2:30 PM Tiara Ma

## 2018-07-18 NOTE — PROGRESS NOTES
Complex Case Management  Follow-Up    Date/Time:  7/18/2018 11:24 AM     NN contacted patient for Complex Case Management  follow up. Spoke to patient. Introduced self/role and reason for call. Patient reported: \"I'm feeling much better. I saw Dr. Di Aaron this past Monday (cystoscopy done), and he told me that he wants to do a biopsy of my bladder because the lining looked different than normal. The biopsy is this upcoming Monday 7/23/18. \"    Pertinent negatives: nausea, vomiting, pain, fever or chills    *Continues to refuse pulmonary appt. At this time as per referral for new pulmonary nodule. Medications:   New medications since last outreach: no  Does patient need refills on any medications: no  Medication changes since last outreach (dose adjustments or discontinued meds): yes, metoclopramide HCl 10 mg Oral BID     Home Health orders at discharge: none      Barriers to care? lack of knowledge about disease      Specialist appointments since last outreach? yes  If so, specialist and date: Dr. Di Aaron, urology on 7/16/18 for cystoscopy     Patient reminded that there are physicians on call 24 hours a day / 7 days a week (M-F 5pm to 8am and from Friday 5pm until Monday 8a for the weekend) should the patient have questions or concerns. No future appointments. 7/23/18 Dr. Di Aaron for biopsy as per patient    Goals      Attends follow-up appointments as directed. Patient will attend all follow-up appointments as directed by 8/13/18         Understands red flags post discharge.             Patient will understand reportable sign/symptoms of urinary tract infection & hematuria by 8/13/18

## 2018-07-23 PROBLEM — R31.0 GROSS HEMATURIA: Status: ACTIVE | Noted: 2018-01-01

## 2018-07-23 NOTE — IP AVS SNAPSHOT
[id number] Patient: Sendy Lr MRN: [id number] :[id number] About your hospitalization You were admitted on:  2018 You last received care in the:  [facility] PHASE 2 RECOVERY You were discharged on:  2018 Why you were hospitalized Your primary diagnosis was:  Not on File Your diagnoses also included:  Gross Hematuria Follow-up Information Follow up With Details Comments Contact Info Mainor Martínez NP   [address] [id number] [phone] Marcus Skinner MD Go on 2018 Call the office for the appointment time [address] [place] [address] [address] [place] [address] 
[phone] Discharge Orders None A check bianca indicates which time of day the medication should be taken. My Medications CONTINUE taking these medications Instructions Each Dose to Equal  
 Morning Noon Evening Bedtime  
 ciprofloxacin HCl 500 mg tablet Commonly known as:  CIPRO Your last dose was: Your next dose is: Take 1 Tab by mouth two (2) times a day. 500 mg CYSTEX CRANBERRY PO Your last dose was: Your next dose is: Take 1 Cap by mouth daily as needed. 1 Cap  
    
   
   
   
  
 escitalopram oxalate 10 mg tablet Commonly known as:  Barneveld Luisito Your last dose was: Your next dose is: TAKE 1 TABLET DAILY  
     
   
   
   
  
 gabapentin 300 mg capsule Commonly known as:  NEURONTIN Your last dose was: Your next dose is: Take 3 Caps by mouth nightly. Indications: Restless Legs Syndrome 900 mg  
    
   
   
   
  
 glipiZIDE 10 mg tablet Commonly known as:  Loura Staci Your last dose was: Your next dose is: Take 1 Tab by mouth two (2) times a day.   
 10 mg  
    
   
   
   
  
 hydroCHLOROthiazide 25 mg tablet Commonly known as:  HYDRODIURIL Your last dose was: Your next dose is: TAKE 1 TABLET DAILY  
     
   
   
   
  
 levothyroxine 100 mcg tablet Commonly known as:  SYNTHROID Your last dose was: Your next dose is: TAKE 1 TABLET DAILY BEFORE BREAKFAST  
     
   
   
   
  
 losartan 50 mg tablet Commonly known as:  COZAAR Your last dose was: Your next dose is: TAKE 1 TABLET DAILY  
     
   
   
   
  
 metFORMIN 500 mg tablet Commonly known as:  GLUCOPHAGE Your last dose was: Your next dose is: Take 500 mg by mouth two (2) times daily (with meals). 500 mg  
    
   
   
   
  
 metoclopramide HCl 10 mg tablet Commonly known as:  REGLAN Your last dose was: Your next dose is: Take 1 Tab by mouth two (2) times a day for 15 days. 10 mg  
    
   
   
   
  
 OTHER Your last dose was: Your next dose is: PLEASE CHECK URINE CULTURE AND SENSITIVITY - PENDING AT TIME OF DISCHARGE AT [facility] OTHER Your last dose was: Your next dose is: To PCP - Check Urine culture results from [facility] - and change antibiotics as appropriate  - Patient has new Lung nodule - s/b [facility] [place] [facility] - SHE NEEDS FOLLOW UP WITH THEM IN 1 -2 WEEKS  
     
   
   
   
  
 PREMARIN 0.625 mg/gram vaginal cream  
Generic drug:  conjugated estrogens Your last dose was: Your next dose is: PLEASE APPLY A PEA SIZED AMOUNT VAGINALLY AT BEDTIME FOR 30 DAYS, THEN TWICE WEEKLY  
     
   
   
   
  
 sucralfate 1 gram tablet Commonly known as:  Katia Andre Your last dose was: Your next dose is: Take 1 Tab by mouth four (4) times daily. 1 g  
    
   
   
   
  
 trimethoprim-sulfamethoxazole 160-800 mg per tablet Commonly known as:  BACTRIM DS, SEPTRA DS Your last dose was: Your next dose is: Take 1 Tab by mouth two (2) times a day. 1 Tab Discharge Instructions Cystoscopy: What to Expect at [facility] Your Recovery A cystoscopy is a procedure that lets a doctor look inside of the bladder and the urethra. The urethra is the tube that carries urine from the bladder to outside the body. The doctor uses a thin, lighted tool called a cystoscope. Your bladder is filled with fluid. This stretches the bladder so that your doctor can look closely at the inside of your bladder. After the cystoscopy, your urethra may be sore at first, and it may burn when you urinate for the first few days after the procedure. You may feel the need to urinate more often, and your urine may be pink. These symptoms should get better in 1 or 2 days. You will probably be able to go back to most of your usual activities in 1 or 2 days. This care sheet gives you a general idea about how long it will take for you to recover. But each person recovers at a different pace. Follow the steps below to get better as quickly as possible. How can you care for yourself at home? Activity 
  · Rest when you feel tired. Getting enough sleep will help you recover.  
  · Try to walk each day. Start by walking a little more than you did the day before. Bit by bit, increase the amount you walk. Walking boosts blood flow and helps prevent pneumonia and constipation.  
  · Avoid strenuous activities, such as bicycle riding, jogging, weight lifting, or aerobic exercise, until your doctor says it is okay.  
  · Ask your doctor when you can drive again.  
  · Most people are able to return to work within 1 or 2 days after the procedure.  
  · You may shower and take baths as usual.  
  · Ask your doctor when it is okay for you to have sex. Diet   · You can eat your normal diet. If your stomach is upset, try bland, low-fat foods like plain rice, broiled chicken, toast, and yogurt.  
  · Drink plenty of fluids (unless your doctor tells you not to). Medicines 
  · Take pain medicines exactly as directed. ¨ If the doctor gave you a prescription medicine for pain, take it as prescribed. ¨ If you are not taking a prescription pain medicine, ask your doctor if you can take an over-the-counter medicine.  
  · If you think your pain medicine is making you sick to your stomach: 
¨ Take your medicine after meals (unless your doctor has told you not to). ¨ Ask your doctor for a different pain medicine.  
  · If your doctor prescribed antibiotics, take them as directed. Do not stop taking them just because you feel better. You need to take the full course of antibiotics. Follow-up care is a key part of your treatment and safety. Be sure to make and go to all appointments, and call your doctor if you are having problems. It's also a good idea to know your test results and keep a list of the medicines you take. When should you call for help? Call 911 anytime you think you may need emergency care. For example, call if: 
  · You passed out (lost consciousness).  
  · You have severe trouble breathing.  
  · You have sudden chest pain and shortness of breath, or you cough up blood.  
  · You have severe belly pain.  
 Call your doctor now or seek immediate medical care if: 
  · You are sick to your stomach or cannot keep fluids down.  
  · Your urine is still red or you see blood clots after you have urinated several times.  
  · You have trouble passing urine or stool, especially if you have pain or swelling in your lower belly.  
  · You have signs of a blood clot, such as: 
¨ Pain in your calf, back of the knee, thigh, or groin. ¨ Redness and swelling in your leg or groin.  
  · You develop a fever or severe chills.   · You have pain in your back just below your rib cage. This is called flank pain.  
 Watch closely for changes in your health, and be sure to contact your doctor if: 
  · You have pain or burning when you urinate. A burning feeling is normal for a day or two after the test, but call if it does not get better.  
  · You have a frequent urge to urinate but can pass only small amounts of urine.  
  · Your urine is pink, red, or cloudy, or smells bad. It is normal for the urine to have a pinkish color for a few days after the test, but call if it does not get better. Where can you learn more? Go to http://joey-andrey.info/. Enter V341 in the search box to learn more about \"Cystoscopy: What to Expect at Home. \" Current as of: May 12, 2017 Content Version: 11.7 © 5655-7086 [employer] Care instructions adapted under license by [employer] (which disclaims liability or warranty for this information). If you have questions about a medical condition or this instruction, always ask your healthcare professional. [place] [address] any warranty or liability for your use of this information. Learning About Urinary Catheter Care to Prevent Infection What is a urinary catheter? A urinary catheter is a flexible plastic tube used to drain urine from your bladder when you can't urinate on your own. The catheter allows urine to drain from the bladder into a bag. Two types of drainage bags may be used with a urinary catheter. · A bedside bag is a large bag that you can hang on the side of your bed or on a chair. You can use it overnight or anytime you will be sitting or lying down for a long time. · A leg bag is a small bag that you can use during the day. It is usually attached to your thigh or calf and hidden under your clothes.  
Having a urinary catheter increases your risk of getting a urinary tract infection. Germs may get on the catheter and cause an infection in your bladder or kidneys. The longer you have a catheter, the more likely it is that you will get an infection. You can help prevent this problem with good hygiene and careful handling of your catheter and drainage bags. How can you help prevent infection? Take care to be clean · Always wash your hands well before and after you handle your catheter. · Clean the skin around the catheter twice a day using soap and water. Dry with a clean towel afterward. You can shower with your catheter and drainage bag in place unless your doctor told you not to. · When you clean around the catheter, check the surrounding skin for signs of infection. Look for things like pus or irritated, swollen, red, or tender skin around the catheter. Be careful with your drainage bag · Always keep the drainage bag below the level of your bladder. This will help keep urine from flowing back into your bladder. · Check often to see that urine is flowing through the catheter into the drainage bag. · Empty the drainage bag when it is half full. This will keep it from overflowing or backing up. · When you empty the drainage bag, do not let the tubing or drain spout touch anything. Be careful with your catheter · Do not unhook the catheter from the drain tube. That could let germs get into the tube. · Make sure that the catheter tubing does not get twisted or kinked. · Do not tug or pull on the catheter. And make sure that the drainage bag does not drag or pull on the catheter. · Do not put powder or lotion on the skin around the catheter. · Talk with your doctor about your options for sexual intercourse while wearing a catheter. How do you empty a urine drainage bag? If your doctor has asked you to keep a record, write down the amount of urine in the bag before you empty it. Wash your hands before and after you touch the bag. 1. Remove the drain spout from its sleeve at the bottom of the drainage bag. 
2. Open the valve on the drain spout. Let the urine flow out into the toilet or a container. Be careful not to let the tubing or drain spout touch anything. 3. After you empty the bag, wipe off any liquid on the end of the drain spout. Close the valve. Then put the drain spout back into its sleeve at the bottom of the collection bag. When should you call for help? Call your doctor now or seek immediate medical care if: 
· You have symptoms of a urinary infection. These may include: 
¨ Pain or burning when you urinate. ¨ A frequent need to urinate without being able to pass much urine. ¨ Pain in the flank, which is just below the rib cage and above the waist on either side of the back. ¨ Blood in your urine. ¨ A fever. · Your urine smells bad. · You see large blood clots in your urine. · No urine or very little urine is flowing into the bag for 4 or more hours. Watch closely for changes in your health, and be sure to contact your doctor if: · The area around the catheter becomes irritated, swollen, red, or tender, or there is pus draining from it. · Urine is leaking from the place where the catheter enters your body. Follow-up care is a key part of your treatment and safety. Be sure to make and go to all appointments, and call your doctor if you are having problems. It's also a good idea to know your test results and keep a list of the medicines you take. Where can you learn more? Go to http://joey-andrey.info/. Enter U010 in the search box to learn more about \"Learning About Urinary Catheter Care to Prevent Infection. \" Current as of: May 12, 2017 Content Version: 11.7 © 3613-7361 [employer]. Care instructions adapted under license by [employer] (which disclaims liability or warranty for this information).  If you have questions about a medical condition or this instruction, always ask your healthcare professional. [place] [address] any warranty or liability for your use of this information. DISCHARGE SUMMARY from Nurse PATIENT INSTRUCTIONS: 
 
After general anesthesia or intravenous sedation, for 24 hours or while taking prescription Narcotics: · Limit your activities · Do not drive and operate hazardous machinery · Do not make important personal or business decisions · Do  not drink alcoholic beverages · If you have not urinated within 8 hours after discharge, please contact your surgeon on call. *  Please give a list of your current medications to your Primary Care Provider. *  Please update this list whenever your medications are discontinued, doses are 
    changed, or new medications (including over-the-counter products) are added. *  Please carry medication information at all times in case of emergency situations. These are general instructions for a healthy lifestyle: No smoking/ No tobacco products/ Avoid exposure to second hand smoke Surgeon General's Warning:  Quitting smoking now greatly reduces serious risk to your health. Obesity, smoking, and sedentary lifestyle greatly increases your risk for illness A healthy diet, regular physical exercise & weight monitoring are important for maintaining a healthy lifestyle You may be retaining fluid if you have a history of heart failure or if you experience any of the following symptoms:  Weight gain of 3 pounds or more overnight or 5 pounds in a week, increased swelling in our hands or feet or shortness of breath while lying flat in bed. Please call your doctor as soon as you notice any of these symptoms; do not wait until your next office visit. Recognize signs and symptoms of STROKE: 
 
F-face looks uneven A-arms unable to move or move unevenly S-speech slurred or non-existent T-time-call 911 as soon as signs and symptoms begin-DO NOT go  
 Back to bed or wait to see if you get better-TIME IS BRAIN. Warning Signs of HEART ATTACK Call 911 if you have these symptoms: 
? Chest discomfort. Most heart attacks involve discomfort in the center of the chest that lasts more than a few minutes, or that goes away and comes back. It can feel like uncomfortable pressure, squeezing, fullness, or pain. ? Discomfort in other areas of the upper body. Symptoms can include pain or discomfort in one or both arms, the back, neck, jaw, or stomach. ? Shortness of breath with or without chest discomfort. ? Other signs may include breaking out in a cold sweat, nausea, or lightheadedness. Don't wait more than five minutes to call [address]! Fast action can save your life. Calling 911 is almost always the fastest way to get lifesaving treatment. Emergency Medical Services staff can begin treatment when they arrive  up to an hour sooner than if someone gets to the hospital by car. The discharge information has been reviewed with the patient and daughter. The patient and daughter verbalized understanding. Discharge medications reviewed with the patient and daughter and appropriate educational materials and side effects teaching were provided. ___________________________________________________________________________________________________________________________________ ACO Transitions of Care Introducing [employer] [address] Dayami Pendleton offers a voluntary care coordination program to provide high quality service and care to [facility] fee-for-service beneficiaries. Jovan Paredes was designed to help you enhance your health and well-being through the following services: ? Transitions of Care  support for individuals who are transitioning from one care setting to another (example: Hospital to home). ?  Chronic and Complex Care Coordination  support for individuals and caregivers of those with serious or chronic illnesses or with more than one chronic (ongoing) condition and those who take a number of different medications. If you meet specific medical criteria, a [address] [place] [address] Rd may call you directly to coordinate your care with your primary care physician and your other care providers. For questions about the [facility] programs, please, contact your physicians office. For general questions or additional information about Accountable Care Organizations: 
Please visit www.medicare.gov/acos. html or call 1-800-MEDICARE (9-646.498.4490) TTY users should call 3-983.353.6419. Introducing [facility] HE[facility]! Dear Shayne Hyman: 
Thank you for requesting a [employer] account. Our records indicate that you already have an active [employer] account. You can access your account anytime at [email or web address] Did you know that you can access your hospital and ER discharge instructions at any time in [employer]? You can also review all of your test results from your hospital stay or ER visit. Additional Information If you have questions, please visit the Frequently Asked Questions section of the [employer] website at [email or web address] Remember, [employer] is NOT to be used for urgent needs. For medical emergencies, dial 911. Now available from your iPhone and Android! Introducing Marcello Silverman As a [facility] patient, I wanted to make you aware of our electronic visit tool called Marcello Silverman. [facility] 24/7 allows you to connect within minutes with a medical provider 24 hours a day, seven days a week via a mobile device or tablet or logging into a secure website from your computer. You can access Marcello Silverman from anywhere in the [place].  
 
A virtual visit might be right for you when you have a simple condition and feel like you just dont want to get out of bed, or cant get away from work for an appointment, when your regular [facility] provider is not available (evenings, weekends or holidays), or when youre out of town and need minor care. Electronic visits cost only $49 and if the [facility] 24/7 provider determines a prescription is needed to treat your condition, one can be electronically transmitted to a nearby pharmacy*. Please take a moment to enroll today if you have not already done so. The enrollment process is free and takes just a few minutes. To enroll, please download the [employer] 24/7 mary to your tablet or phone, or visit [email or web address] to enroll on your computer. And, as an [address] [place] [address] patient with a [employer] account, the results of your visits will be scanned into your electronic medical record and your primary care provider will be able to view the scanned results. We urge you to continue to see your regular [facility] provider for your ongoing medical care. And while your primary care provider may not be the one available when you seek a [employer] virtual visit, the peace of mind you get from getting a real diagnosis real time can be priceless. For more information on [employer], view our Frequently Asked Questions (FAQs) at [email or web address]. Sincerely, 
 
Jj Day MD 
Chief Medical Officer Farzana Dayami Pendleton *:  certain medications cannot be prescribed via [employer] Unresulted Labs-Please follow up with your PCP about these lab tests Order Current Status XR RETROGRADE PYELOGRAM BILAT In process Providers Seen During Your Hospitalization Provider Specialty Primary office phone Herbert Salas MD Urology [phone] Your Primary Care Physician (PCP) Primary Care Physician Office Phone Office Fax Vivek Alvarez [phone] You are allergic to the following Allergen Reactions Phenergan (Promethazine) Other (comments) Made patient very hyper and extremely agitated Recent Documentation Height Weight BMI OB Status Smoking Status 1.626 m 74.8 kg 28.29 kg/m2 Postmenopausal Former Smoker Emergency Contacts Name Discharge Info Relation Home Work [employer] [facility] HOSPITAL DISCHARGE CAREGIVER [3] Daughter [[phone] Patient Belongings The following personal items are in your possession at time of discharge: 
  Dental Appliances: None         Home Medications: None   Jewelry: Ring (3 yellow metal rings. inable to remove)  Clothing: Pants, Shirt, Footwear, Jacket/Coat, Undergarments    Other Valuables: None Please provide this summary of care documentation to your next provider. Signatures-by signing, you are acknowledging that this After Visit Summary has been reviewed with you and you have received a copy. Patient Signature:  ____________________________________________________________ Date:  ____________________________________________________________  
  
Carly Quiñones Provider Signature:  ____________________________________________________________ Date:  ____________________________________________________________

## 2018-07-23 NOTE — ANESTHESIA PREPROCEDURE EVALUATION
Anesthetic History   No history of anesthetic complications            Review of Systems / Medical History  Patient summary reviewed and pertinent labs reviewed    Pulmonary                   Neuro/Psych              Cardiovascular    Hypertension                   GI/Hepatic/Renal                Endo/Other    Diabetes: type 2  Hypothyroidism  Obesity and arthritis     Other Findings   Comments: Documentation of current medication  Current medications obtained, documented and obtained? YES      Risk Factors for Postoperative nausea/vomiting:       History of postoperative nausea/vomiting? NO       Female? YES       Motion sickness? NO       Intended opioid administration for postoperative analgesia? NO      Smoking Abstinence:  Current Smoker? NO  Elective Surgery? YES  Seen preoperatively by anesthesiologist or proxy prior to day of surgery? YES  Pt abstained from smoking 24 hours prior to anesthesia?  N/A    Preventive care/screening for High Blood Pressure:  Aged 18 years and older: YES  Screened for high blood pressure: YES  Patients with high blood pressure referred to primary care provider   for BP management: YES                 Physical Exam    Airway  Mallampati: III  TM Distance: 4 - 6 cm  Neck ROM: normal range of motion   Mouth opening: Diminished (comment)     Cardiovascular    Rhythm: regular  Rate: normal         Dental    Dentition: Poor dentition     Pulmonary  Breath sounds clear to auscultation               Abdominal  GI exam deferred       Other Findings            Anesthetic Plan    ASA: 3  Anesthesia type: general          Induction: Intravenous  Anesthetic plan and risks discussed with: Patient

## 2018-07-23 NOTE — DISCHARGE INSTRUCTIONS
Cystoscopy: What to Expect at [address]    A cystoscopy is a procedure that lets a doctor look inside of the bladder and the urethra. The urethra is the tube that carries urine from the bladder to outside the body. The doctor uses a thin, lighted tool called a cystoscope. Your bladder is filled with fluid. This stretches the bladder so that your doctor can look closely at the inside of your bladder. After the cystoscopy, your urethra may be sore at first, and it may burn when you urinate for the first few days after the procedure. You may feel the need to urinate more often, and your urine may be pink. These symptoms should get better in 1 or 2 days. You will probably be able to go back to most of your usual activities in 1 or 2 days. This care sheet gives you a general idea about how long it will take for you to recover. But each person recovers at a different pace. Follow the steps below to get better as quickly as possible. How can you care for yourself at home? Activity    · Rest when you feel tired. Getting enough sleep will help you recover.     · Try to walk each day. Start by walking a little more than you did the day before. Bit by bit, increase the amount you walk. Walking boosts blood flow and helps prevent pneumonia and constipation.     · Avoid strenuous activities, such as bicycle riding, jogging, weight lifting, or aerobic exercise, until your doctor says it is okay.     · Ask your doctor when you can drive again.     · Most people are able to return to work within 1 or 2 days after the procedure.     · You may shower and take baths as usual.     · Ask your doctor when it is okay for you to have sex. Diet    · You can eat your normal diet. If your stomach is upset, try bland, low-fat foods like plain rice, broiled chicken, toast, and yogurt.     · Drink plenty of fluids (unless your doctor tells you not to). Medicines    · Take pain medicines exactly as directed.   ¨ If the doctor gave you a prescription medicine for pain, take it as prescribed. ¨ If you are not taking a prescription pain medicine, ask your doctor if you can take an over-the-counter medicine.     · If you think your pain medicine is making you sick to your stomach:  ¨ Take your medicine after meals (unless your doctor has told you not to). ¨ Ask your doctor for a different pain medicine.     · If your doctor prescribed antibiotics, take them as directed. Do not stop taking them just because you feel better. You need to take the full course of antibiotics. Follow-up care is a key part of your treatment and safety. Be sure to make and go to all appointments, and call your doctor if you are having problems. It's also a good idea to know your test results and keep a list of the medicines you take. When should you call for help? Call 911 anytime you think you may need emergency care. For example, call if:    · You passed out (lost consciousness).     · You have severe trouble breathing.     · You have sudden chest pain and shortness of breath, or you cough up blood.     · You have severe belly pain.    Call your doctor now or seek immediate medical care if:    · You are sick to your stomach or cannot keep fluids down.     · Your urine is still red or you see blood clots after you have urinated several times.     · You have trouble passing urine or stool, especially if you have pain or swelling in your lower belly.     · You have signs of a blood clot, such as:  ¨ Pain in your calf, back of the knee, thigh, or groin. ¨ Redness and swelling in your leg or groin.     · You develop a fever or severe chills.     · You have pain in your back just below your rib cage. This is called flank pain.    Watch closely for changes in your health, and be sure to contact your doctor if:    · You have pain or burning when you urinate.  A burning feeling is normal for a day or two after the test, but call if it does not get better.     · You have a frequent urge to urinate but can pass only small amounts of urine.     · Your urine is pink, red, or cloudy, or smells bad. It is normal for the urine to have a pinkish color for a few days after the test, but call if it does not get better. Where can you learn more? Go to http://joey-andrey.info/. Enter F781 in the search box to learn more about \"Cystoscopy: What to Expect at Home. \"  Current as of: May 12, 2017  Content Version: 11.7  © 8806-1947 [employer]. Care instructions adapted under license by [employer] (which disclaims liability or warranty for this information). If you have questions about a medical condition or this instruction, always ask your healthcare professional. [address] any warranty or liability for your use of this information. Learning About Urinary Catheter Care to Prevent Infection  What is a urinary catheter? A urinary catheter is a flexible plastic tube used to drain urine from your bladder when you can't urinate on your own. The catheter allows urine to drain from the bladder into a bag. Two types of drainage bags may be used with a urinary catheter. · A bedside bag is a large bag that you can hang on the side of your bed or on a chair. You can use it overnight or anytime you will be sitting or lying down for a long time. · A leg bag is a small bag that you can use during the day. It is usually attached to your thigh or calf and hidden under your clothes. Having a urinary catheter increases your risk of getting a urinary tract infection. Germs may get on the catheter and cause an infection in your bladder or kidneys. The longer you have a catheter, the more likely it is that you will get an infection. You can help prevent this problem with good hygiene and careful handling of your catheter and drainage bags. How can you help prevent infection?   Take care to be clean  · Always wash your hands well before and after you handle your catheter. · Clean the skin around the catheter twice a day using soap and water. Dry with a clean towel afterward. You can shower with your catheter and drainage bag in place unless your doctor told you not to. · When you clean around the catheter, check the surrounding skin for signs of infection. Look for things like pus or irritated, swollen, red, or tender skin around the catheter. Be careful with your drainage bag  · Always keep the drainage bag below the level of your bladder. This will help keep urine from flowing back into your bladder. · Check often to see that urine is flowing through the catheter into the drainage bag. · Empty the drainage bag when it is half full. This will keep it from overflowing or backing up. · When you empty the drainage bag, do not let the tubing or drain spout touch anything. Be careful with your catheter  · Do not unhook the catheter from the drain tube. That could let germs get into the tube. · Make sure that the catheter tubing does not get twisted or kinked. · Do not tug or pull on the catheter. And make sure that the drainage bag does not drag or pull on the catheter. · Do not put powder or lotion on the skin around the catheter. · Talk with your doctor about your options for sexual intercourse while wearing a catheter. How do you empty a urine drainage bag? If your doctor has asked you to keep a record, write down the amount of urine in the bag before you empty it. Wash your hands before and after you touch the bag. 1. Remove the drain spout from its sleeve at the bottom of the drainage bag.  2. Open the valve on the drain spout. Let the urine flow out into the toilet or a container. Be careful not to let the tubing or drain spout touch anything. 3. After you empty the bag, wipe off any liquid on the end of the drain spout. Close the valve.  Then put the drain spout back into its sleeve at the bottom of the collection bag.  When should you call for help? Call your doctor now or seek immediate medical care if:  · You have symptoms of a urinary infection. These may include:  ¨ Pain or burning when you urinate. ¨ A frequent need to urinate without being able to pass much urine. ¨ Pain in the flank, which is just below the rib cage and above the waist on either side of the back. ¨ Blood in your urine. ¨ A fever. · Your urine smells bad. · You see large blood clots in your urine. · No urine or very little urine is flowing into the bag for 4 or more hours. Watch closely for changes in your health, and be sure to contact your doctor if:  · The area around the catheter becomes irritated, swollen, red, or tender, or there is pus draining from it. · Urine is leaking from the place where the catheter enters your body. Follow-up care is a key part of your treatment and safety. Be sure to make and go to all appointments, and call your doctor if you are having problems. It's also a good idea to know your test results and keep a list of the medicines you take. Where can you learn more? Go to http://joey-andrey.info/. Enter U010 in the search box to learn more about \"Learning About Urinary Catheter Care to Prevent Infection. \"  Current as of: May 12, 2017  Content Version: 11.7  © 8217-5120 [employer]. Care instructions adapted under license by [employer] (which disclaims liability or warranty for this information). If you have questions about a medical condition or this instruction, always ask your healthcare professional. [place] [address] any warranty or liability for your use of this information.     DISCHARGE SUMMARY from Nurse    PATIENT INSTRUCTIONS:    After general anesthesia or intravenous sedation, for 24 hours or while taking prescription Narcotics:  · Limit your activities  · Do not drive and operate hazardous machinery  · Do not make important personal or business decisions  · Do  not drink alcoholic beverages  · If you have not urinated within 8 hours after discharge, please contact your surgeon on call. *  Please give a list of your current medications to your Primary Care Provider. *  Please update this list whenever your medications are discontinued, doses are      changed, or new medications (including over-the-counter products) are added. *  Please carry medication information at all times in case of emergency situations. These are general instructions for a healthy lifestyle:    No smoking/ No tobacco products/ Avoid exposure to second hand smoke  Surgeon General's Warning:  Quitting smoking now greatly reduces serious risk to your health. Obesity, smoking, and sedentary lifestyle greatly increases your risk for illness    A healthy diet, regular physical exercise & weight monitoring are important for maintaining a healthy lifestyle    You may be retaining fluid if you have a history of heart failure or if you experience any of the following symptoms:  Weight gain of 3 pounds or more overnight or 5 pounds in a week, increased swelling in our hands or feet or shortness of breath while lying flat in bed. Please call your doctor as soon as you notice any of these symptoms; do not wait until your next office visit. Recognize signs and symptoms of STROKE:    F-face looks uneven    A-arms unable to move or move unevenly    S-speech slurred or non-existent    T-time-call 911 as soon as signs and symptoms begin-DO NOT go       Back to bed or wait to see if you get better-TIME IS BRAIN. Warning Signs of HEART ATTACK     Call 911 if you have these symptoms:   Chest discomfort. Most heart attacks involve discomfort in the center of the chest that lasts more than a few minutes, or that goes away and comes back. It can feel like uncomfortable pressure, squeezing, fullness, or pain.  Discomfort in other areas of the upper body.  Symptoms can include pain or discomfort in one or both arms, the back, neck, jaw, or stomach.  Shortness of breath with or without chest discomfort.  Other signs may include breaking out in a cold sweat, nausea, or lightheadedness. Don't wait more than five minutes to call 911 - MINUTES MATTER! Fast action can save your life. Calling 911 is almost always the fastest way to get lifesaving treatment. Emergency Medical Services staff can begin treatment when they arrive -- up to an hour sooner than if someone gets to the hospital by car. The discharge information has been reviewed with the patient and daughter. The patient and daughter verbalized understanding. Discharge medications reviewed with the patient and daughter and appropriate educational materials and side effects teaching were provided.   ___________________________________________________________________________________________________________________________________

## 2018-07-23 NOTE — ANESTHESIA POSTPROCEDURE EVALUATION
Post-Anesthesia Evaluation and Assessment    Patient: Alejandra Higuera MRN: [id number]  SSN: [id number]    YOB: 1935  Age: [de-identified] y.o. Sex: female     VS from flow sheet    Cardiovascular Function/Vital Signs  Visit Vitals    /58    Pulse (!) 55    Temp 36.3 °C (97.4 °F)    Resp 12    Ht 5' 4\" (1.626 m)    Wt 74.8 kg (164 lb 12.8 oz)    SpO2 94%    BMI 28.29 kg/m2       Patient is status post general anesthesia for Procedure(s):  CYSTOSCOPY WITH BILATERAL RETROGRADES WITH BLADDER BIOPSY. Nausea/Vomiting: None    Postoperative hydration reviewed and adequate. Pain:  Pain Scale 1: Numeric (0 - 10) (07/23/18 1150)  Pain Intensity 1: 0 (\"just the catheter is uncomfortable\") (07/23/18 1150)   Managed    Neurological Status:   Neuro (WDL): Within Defined Limits (07/23/18 0919)   At baseline    Mental Status and Level of Consciousness: Arousable    Pulmonary Status:   O2 Device: Room air (07/23/18 1141)   Adequate oxygenation and airway patent    Complications related to anesthesia: None    Post-anesthesia assessment completed.  No concerns    Signed By: Bernie Michael MD     July 23, 2018

## 2018-07-23 NOTE — H&P
Encounter Diagnoses       ICD-10-CM ICD-9-CM   1. Recurrent UTI N39.0 599.0   2. Gross hematuria R31.0 599.71   3. Bilateral hydronephrosis N13.30 591   4. Proteinuria, unspecified type R80.9 791.0      ASSESSMENT & PLAN:   1. Recurrent UTIs / Gross Hematuria                        Most recent urine culture 7/5/18 - + Staph auricularis                        UA today 7/12/18 - 4+ blood, 4+ leuks                        Urine sent for culture - will call patient with results and f/u with abx accordingly                          Cystoscopy today 7/16/18 - Severe, probable inflammatory changes throughout bladder, UOs not visualized                         Start Septra DS #14 BID - Rx today. R/B's and SE's discussed. Continue Reglan 10 mg BID PRN #30 for nausea - Rx today. R/B's and SE's discussed. Plan for cystoscopy, bilateral retrogrades, biopsies of bladder - surgery letter sent     2. Bilateral Hydronephrosis                        By CT Ab Pelv 6/8/18 - impression below                        Reviewed CT Ab Pelv 7/5/18 - moderate bilateral hydronephrosis and hydroureter, slightly worse on the right side. Possibly 2/2 inflammation of bladder     3. Pulmonary Nodules                        By CT Ab Pelv 6/8/18                        Followed by Ty Martinez NP      BMI 27.98 - Patient's BMI is out of the normal parameters. Information about BMI was given and patient was advised to follow-up with their PCP for further management.     Follow-up Disposition:  Return for post-op follow up. Chief Complaint   Patient presents with   Dariel Candelario Hematuria    Recurrent UTI         HPI: Jonathan Cullen is a [de-identified] y.o. WHITE OR  female  who presents today for a cystoscopy in evaluation of recurrent UTIs, gross hematuria and bilateral hydronephrosis. She is also followed for atrophic vaginitis.  When symptomatic for UTI, patient c/o severe urinary urgency. She has been treated with Cipro in the past with benefit. She manages with Macrobid 50 mg every other day. Patient presented to [facility] 6/8/18 c/o gross hematuria. Urine culture was performed and returned positive for pseudomonas aeruginosa. CT Ab Pelv was performed and revealed moderately pronounced bilateral hydroureteronephrosis. She was diagnosed with UTI/cystitis and discharged on Cipro. Patient was last seen in office 6/13/18 c/o dysuria. CT Ab Pelv 7/5/18 revealed moderate bilateral hydronephrosis and hydroureter which can be followed to the level of the bladder slightly worse on the right side.      Today, patient presents with her daughter who helps provider her history  Reports intermittent gross hematuria  She was started on Macrodantin with SE of severe nausea  Denies dysuria  No flank or abdominal pain  Denies f/n/v/c     LABS / IMAGING:  Urine Culture Trend  6/8/18 - + pseudomonas aeruginosa  7/5/18 - + Staph auricularis     CT Ab Pelv W Contrast 7/5/18  Impression  1. Moderate bilateral hydronephrosis and hydroureter which can be followed to  the level of the bladder slightly worse on the right side not significantly  changed since June 8, 2018. It should be noted that the bladder is not well  evaluated due to marked scatter artifact from bilateral hip replacements. Findings could be related to bladder outlet obstruction or bilateral UVJ  obstruction. Clinical correlation is needed. Urologic consultation suggested if  not already obtained. No renal stones are seen. 2. Hepatic steatosis. Small hiatal hernia. Colonic diverticulosis without  diverticulitis. Umbilical hernia containing fat without incarceration. Arterial  atherosclerosis. Additional chronic changes as described above.     US Pelv 6/8/18  IMPRESSION:  1. Soft tissue structure in the bladder may represent neoplasm.  Recommend direct  visualization.     CT Ab Pelv Wo Conrtast 6/8/18  IMPRESSION[de-identified]  1.  Moderately pronounced bilateral hydroureteronephrosis. Accompanying  distention of the urinary bladder with mild perivesical haziness.  -This might simply reflect consequence of cystitis and urinary outflow  obstruction from bladder neck muscle spasm. However, there is rather limited  evaluation of the urinary bladder due to the presence of the bilateral hip  replacements. In light of this, cannot exclude the possibility of a lower  urinary bladder mass or clot. -Should consider supplemental evaluation with bladder ultrasound provided the  urinary bladder remains distended (affording acoustic window). 2. Large bowel diverticulosis. 3. Large fatty ventral hernia.     [place]ON 12/6/16  FINDINGS:   Right kidney 9.4 x 3.9 x 3.8 cm. Renal morphology unremarkable. No contour  deforming solid mass. Parenchymal echogenicity within normal limits. No  hydronephrosis or perinephric fluid collection. Unremarkable flow pattern upon  color Doppler interrogation. Incidentally imaged portions of liver grossly  unremarkable. Left kidney 11.0 x 4.6 x 3.5 cm. Renal morphology unremarkable. No contour  deforming solid mass. Parenchymal echogenicity within normal limits. No  hydronephrosis or perinephric fluid collection. Unremarkable flow pattern upon  color Doppler interrogation. Incidentally imaged portions of spleen grossly  unremarkable. Urinary bladder morphologically unremarkable; urinary bladder volume 240 mL (no  post-void volume provided). IMPRESSION:  No acute process.     AUA Assessment Score:   ;    AUA Bother Rating:              Current Outpatient Prescriptions   Medication Sig Dispense Refill    trimethoprim-sulfamethoxazole (BACTRIM DS, SEPTRA DS) 160-800 mg per tablet Take 1 Tab by mouth two (2) times a day. 14 Tab 0    metoclopramide HCl (REGLAN) 10 mg tablet Take 1 Tab by mouth two (2) times a day for 15 days. 30 Tab 0    sucralfate (CARAFATE) 1 gram tablet Take 1 Tab by mouth four (4) times daily.  30 Tab 0    glipiZIDE (GLUCOTROL) 10 mg tablet Take 1 Tab by mouth two (2) times a day. 60 Tab 2    PREMARIN 0.625 mg/gram vaginal cream PLEASE APPLY A PEA SIZED AMOUNT VAGINALLY AT BEDTIME FOR 30 DAYS, THEN TWICE WEEKLY   0    ciprofloxacin HCl (CIPRO) 500 mg tablet Take 1 Tab by mouth two (2) times a day. 10 Tab 0    OTHER PLEASE CHECK URINE CULTURE AND SENSITIVITY - PENDING AT TIME OF DISCHARGE AT [facility] 1 Act 0    OTHER To PCP  - Check Urine culture results from [facility] - and change antibiotics as appropriate   - Patient has new Lung nodule - [facility] - SHE NEEDS FOLLOW UP WITH THEM IN 1 -2 WEEKS 1 Act 0    losartan (COZAAR) 50 mg tablet TAKE 1 TABLET DAILY 90 Tab 1    hydroCHLOROthiazide (HYDRODIURIL) 25 mg tablet TAKE 1 TABLET DAILY 90 Tab 1    escitalopram oxalate (LEXAPRO) 10 mg tablet TAKE 1 TABLET DAILY 90 Tab 2    metFORMIN (GLUCOPHAGE) 500 mg tablet Take 500 mg by mouth two (2) times daily (with meals).        CRAN/VITC/MANNOSE/FOS/BROMELN (CYSTEX CRANBERRY PO) Take 1 Cap by mouth daily as needed.        gabapentin (NEURONTIN) 300 mg capsule Take 3 Caps by mouth nightly. Indications: Restless Legs Syndrome 270 Cap 2    levothyroxine (SYNTHROID) 100 mcg tablet TAKE 1 TABLET DAILY BEFORE BREAKFAST 90 Tab 3         All:         Allergies   Allergen Reactions    Phenergan [Promethazine] Other (comments)       Made patient very hyper and extremely agitated                Past Medical History:   Diagnosis Date    Allergies      Diabetes mellitus ([facility] [address] 1/11/2010    Hearing loss 1/11/2010    HTN (hypertension) 1/11/2010    Hypercholesteremia 1/11/2010    Hypothyroidism 1/11/2010    Macular degeneration       shanae Cordon       Left femural neck    Otosclerosis       Left ear    Panic attack      Thyroid disease       hypothyroid    Unspecified adverse effect of anesthesia       hard to wake up in the past. Had own blood transfusion and was better               Past Surgical History: Procedure Laterality Date    HX HEENT         Left stapedectomy 10/03    HX HEENT   7/2012     eye lids lifted Dr. Santiago Reinoso HX MALIGNANT SKIN LESION EXCISION   07/26/2013     Upper arm basal cell skin cancer removal    HX ORTHOPAEDIC         Right hip replacement 2004         Review of Systems  Constitutional: Fever:   Skin: Rash: HEENT: Hearing difficulty:   Eyes: Blurred vision:   Cardiovascular: Chest pain:   Respiratory: Shortness of breath:   Gastrointestinal: Nausea/vomiting:   Musculoskeletal: Back pain:   Neurological: Weakness:   Psychological: Memory loss:   Comments/additional findings:      PHYSICAL EXAM:         Visit Vitals    Temp 97.8 °F (36.6 °C)      Constitutional: WDWN, Pleasant and appropriate affect, No acute distress. CV:  No peripheral swelling noted  Respiratory: No respiratory distress or difficulties  Abdomen:  No abdominal masses or tenderness. No CVA tenderness. No hernias noted. Skin: No jaundice. Neuro/Psych:  Alert and oriented x 3. Affect appropriate.    Lymphatic:   No enlarged inguinal lymph nodes.        Labs Today:        Results for orders placed or performed in visit on 07/16/18   AMB POC URINALYSIS DIP STICK AUTO W/O MICRO   Result Value Ref Range     Color (UA POC) Brown       Clarity (UA POC) Turbid       Glucose (UA POC) Negative Negative     Bilirubin (UA POC) Negative Negative     Ketones (UA POC) Negative Negative     Specific gravity (UA POC) 1.020 1.001 - 1.035     Blood (UA POC) 4+ Negative     pH (UA POC) 6.0 4.6 - 8.0     Protein (UA POC) 2+ Negative     Urobilinogen (UA POC) 0.2 mg/dL 0.2 - 1     Nitrites (UA POC) Negative Negative     Leukocyte esterase (UA POC) 4+ Negative

## 2018-07-23 NOTE — BRIEF OP NOTE
BRIEF OPERATIVE NOTE    Date of Procedure: 7/23/2018   Preoperative Diagnosis: Hematuria, gross [R31.0]  Postoperative Diagnosis: Hematuria, gross [R31.0]  Bilateral hydronephrosis    Procedure(s):  CYSTOSCOPY WITH BILATERAL RETROGRADES WITH BLADDER BIOPSY  Surgeon(s) and Role:     * Sapna Knight MD - Primary         Surgical Assistant: none    Surgical Staff:  Circ-1: Ismael Fregoso; Tali Solis, RN  Radiology Technician: Tammy Amato  Scrub Tech-1: Hang Virk  Event Time In   Incision Start 10:37 AM   Incision Close 11:19 AM     Anesthesia: General   Estimated Blood Loss: none  Specimens:   ID Type Source Tests Collected by Time Destination   1 : Bladder Biopsy Preservative Bladder  Sapna Knight MD 7/23/2018 11:09 AM Pathology      Findings: severe bilat  hydro,  ? upj,  abnorml  bladder mucosa   Complications: none  Implants: * No implants in log *

## 2018-07-23 NOTE — PERIOP NOTES
I have reviewed discharge and chung catheter care instructions and demonstrated how to empty the chung catheter with the patient and daughter, Carol Flores. The patient and daughter verbalized understanding. Patient armband removed and shredded.

## 2018-07-24 NOTE — OP NOTES
[facility] REPORT    Kyrie May  MR#: [id number]  : 1935  ACCOUNT #: [de-identified]   DATE OF SERVICE: 2018    PREOPERATIVE DIAGNOSES:  Bilateral hydronephrosis, gross hematuria. POSTOPERATIVE DIAGNOSES:  Abnormal bladder mucosa, bilateral hydronephrosis, laterally ectopic ureters, UPJ obstruction, abnormal bladder mucosa. PROCEDURE PERFORMED:  Cystoscopy, bilateral retrograde, bilateral double J catheter, biopsy of bladder. SURGEON:  Ajit Hernandez MD     ANESTHESIA:  General.    ESTIMATED BLOOD LOSS:  Minimal.    SPECIMENS REMOVED:  Bladder biopsy. COMPLICATIONS:  None. IMPLANTS:  Bilateral double J catheters. ASSISTANT:  None. DESCRIPTION OF PROCEDURE: The patient was taken to the operating room. She has had recurrent gross hematuria and noted on ultrasound and CT to have bilateral hydronephrosis. A 22-Hong Konger cystoscope was placed in the bladder. The bladder mucosa shows diffuse red, erythematous, raised areas suspicious for CIS. There were no papillary bladder tumors. The ureteral orifices are quite difficult initially to evaluate because they are extremely laterally ectopic and proximal.  They are almost golf hole in appearance. First, the left side was catheterized, and a ureteropyelogram shows a dilated ureter with an apparent obstruction at the left UPJ with severe left hydronephrosis. There are no definite filling defects. A Glidewire was able to be placed in the upper pole of the kidney, and a 6-Hong Konger variable length stent was coiled, one end in the kidney and one in the bladder. The right ureteral orifice was similarly markedly lateral and golf hole in appearance. A Glidewire was able to be pushed up to the kidney. A ureteropyelogram again confirms marked severe hydronephrosis and dilation of the ureter distally. There appears to be a UPJ obstruction. There were no definite filling defects.   A Glidewire was placed into the upper pole. A 6-Khmer variable length stent was coiled, one end into the upper pole and one into the bladder. Next, a cold cup biopsy was used to take several cold cup biopsies of the abnormal-appearing mucosa. There is negligible bleeding. An 18-Khmer Navarro catheter was placed to straight drainage, with clear urine effluxing. She tolerated the procedure well. Further recommendations pending the biopsy. Given her age and the fact that she does have chronic renal insufficiency, we will see if her creatinine improves with upper tract drainage. Consideration may be given to putting bilateral metal stents in her.       MD CHAPARRO Bojorquez / Maria Antonia Whitfield  D: 07/23/2018 11:25     T: 07/23/2018 20:19  JOB #: [id number]

## 2018-07-25 PROBLEM — Q62.8 BILATERAL CONGENITAL PRIMARY HYDRONEPHROSIS: Status: ACTIVE | Noted: 2018-01-01

## 2018-07-25 PROBLEM — R33.9 URINARY RETENTION: Status: ACTIVE | Noted: 2018-01-01

## 2018-07-25 PROBLEM — N13.70 VESICOURETERAL REFLUX: Status: ACTIVE | Noted: 2018-01-01

## 2018-07-26 NOTE — PROGRESS NOTES
Complex Case Management  Follow-Up    Date/Time:  7/26/2018 9:13 AM     NN contacted patient for Complex Case Management  follow up. Spoke to patient. Introduced self/role and reason for call. Patient reported: \"I'm feeling fine now. I had the biopsy (cystoscopy w/ bladder biopsy) on Monday 7/23/18. He told me that he found some abnormalities, and will place more stents in a few months. He also ordered some blood work for next week. \"     As per Dr. Jian Beltrán progress note, dated 7/25/18: \"S/p cystoscopy, bilateral retrogrades, bilateral JJ stent placement, biopsies of bladder 7/23/18 - noted  bilateral apparent obstruction at the UPJ with severe hydronephrosis                        Pathology noted chronic and acute cystitis, no malignancy                        Navarro removed in office this am. PVR this pm 314cc, after 2nd void 170 cc                        Instructed patient to perform Crede maneuver                        Ordered BMP to be collected in 1 week                        Pending BMP, will place metal stents bilaterally and change annually. \"    Pertinent negatives: fever, chills, pain, problems urinating, hematuria    *Continues to refuse pulmonary appt. At this time as per referral for new pulmonary nodule. NN advised the patient to let us know when she's ready. Medications:   New medications since last outreach: no  Does patient need refills on any medications: no  Medication changes since last outreach (dose adjustments or discontinued meds): no    Home Health orders at discharge: none      Barriers to care? lack of knowledge about disease      Specialist appointments since last outreach?  yes  If so, specialist and date: Dr. Jason Overton, urology on 7/23/18 for cystoscopy & bladder biopsy & Dr. Jason Overton, urology for follow-up on 7/25/18    Patient reminded that there are physicians on call 24 hours a day / 7 days a week (M-F 5pm to 8am and from Friday 5pm until Monday 8a for the weekend) should the patient have questions or concerns. Future Appointments  Date Time Provider Lisha Osborne   9/10/2018 11:00 AM Adela Everett NP [facility]   10/8/2018 2:00 PM MD Ramona Johnsontony        Goals     None        Patient has graduated from the Complex Case Management  program on 7/26/18. Patient stable at this time.

## 2018-07-26 NOTE — Clinical Note
Ugo Padilla, I attached you to this message because you saw this patient for her EVAN for [facility] 6/8-6/10/18 for gross hematuria r/t acute cystitis, bilateral hydronephrosis, & new pulmonary nodule. Just an FYI ladies, this patient is doing better s/p cystoscopy & bladder biopsy on 7/23/18 by Dr. Shanice Bishop. Pending BMP x 1 wk, and metal stents are to be placed (unknown date at this time). The patient thinks it's in a few months. -She continues to refuse pulmonary follow-up as referred last month for new lung nodules.  -She's scheduled to see you Jf Mehta on 9/10/18 at 11am for a routine visit.

## 2018-08-03 NOTE — TELEPHONE ENCOUNTER
Spoke with the patient. Advised the patient that she will need an appointment for this request. She verbalized understanding. Appointment scheduled. States she needs medication due to being sick recently and feeling nervous. Patient denies being suicidal or homicidal at this time. If symptoms worsen, she will need to go to the ER. She verbalized understanding.

## 2018-08-07 PROBLEM — R53.1 WEAKNESS GENERALIZED: Status: RESOLVED | Noted: 2018-01-01 | Resolved: 2018-01-01

## 2018-08-07 PROBLEM — R31.9 HEMATURIA: Status: RESOLVED | Noted: 2018-01-01 | Resolved: 2018-01-01

## 2018-08-07 PROBLEM — N39.0 ACUTE UTI: Status: RESOLVED | Noted: 2018-01-01 | Resolved: 2018-01-01

## 2018-08-07 NOTE — PROGRESS NOTES
Subjective:   Jeffery Vargas is a [de-identified] y.o. female here today with acute anxiety associated with the death of a close friend approximately one month ago and the death of her daughter several months ago. She also has had a hospital procedure she reports this month. She was driven here by a friend. PHQ-9 score 18  DELLA-7 score 17   ROS: denies suicidal ideations, +intense anxiety, +weight loss, +anhedonia, +crying   Note patient was admitted to the hospital for gross hematuria 7-23-18 and has been seen by Dr. Lico Damon her urologist/ she reports that she has been following his treatment plan and has a follow up with urology scheduled. 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 s/p cystoscopy, bilateral retrogrades, bilateral JJ stent placement, biopsies of bladder 7/23/18 which were negative for malignancy. Lab Results   Component Value Date/Time    Sodium 136 07/16/2018 04:55 PM    Potassium 3.3 (L) 07/16/2018 04:55 PM    Chloride 98 (L) 07/16/2018 04:55 PM    CO2 26 07/16/2018 04:55 PM    Anion gap 12 07/16/2018 04:55 PM    Glucose 140 (H) 07/16/2018 04:55 PM    BUN 26 (H) 07/16/2018 04:55 PM    Creatinine 2.41 (H) 07/16/2018 04:55 PM    BUN/Creatinine ratio 11 (L) 07/16/2018 04:55 PM    GFR est AA 23 (L) 07/16/2018 04:55 PM    GFR est non-AA 19 (L) 07/16/2018 04:55 PM    Calcium 9.5 07/16/2018 04:55 PM    Bilirubin, total 0.5 07/05/2018 08:01 AM    AST (SGOT) 14 (L) 07/05/2018 08:01 AM    Alk. phosphatase 74 07/05/2018 08:01 AM    Protein, total 7.4 07/05/2018 08:01 AM    Albumin 3.6 07/05/2018 08:01 AM    Globulin 3.8 07/05/2018 08:01 AM    A-G Ratio 0.9 07/05/2018 08:01 AM    ALT (SGPT) 18 07/05/2018 08:01 AM       See consult note.    Wt Readings from Last 3 Encounters:   08/07/18 155 lb (70.3 kg)   07/25/18 160 lb (72.6 kg)   07/25/18 164 lb (74.4 kg)       Current Outpatient Prescriptions Medication Sig Dispense Refill    LORazepam (ATIVAN) 0.5 mg tablet Take 1 Tab by mouth every eight (8) hours as needed for Anxiety. Max Daily Amount: 1.5 mg. 30 Tab 0    escitalopram oxalate (LEXAPRO) 20 mg tablet Take 1 Tab by mouth daily. 90 Tab 1    sucralfate (CARAFATE) 1 gram tablet Take 1 Tab by mouth four (4) times daily. 30 Tab 0    glipiZIDE (GLUCOTROL) 10 mg tablet Take 1 Tab by mouth two (2) times a day. 60 Tab 2    OTHER PLEASE CHECK URINE CULTURE AND SENSITIVITY - PENDING AT TIME OF DISCHARGE AT [facility] 1 Act 0    OTHER To PCP  - Check Urine culture results from [address] - and change antibiotics as appropriate   - Patient has new Lung nodule - s/b [facility] - SHE NEEDS FOLLOW UP WITH THEM IN 1 -2 WEEKS 1 Act 0    losartan (COZAAR) 50 mg tablet TAKE 1 TABLET DAILY 90 Tab 1    hydroCHLOROthiazide (HYDRODIURIL) 25 mg tablet TAKE 1 TABLET DAILY 90 Tab 1    CRAN/VITC/MANNOSE/FOS/BROMELN (CYSTEX CRANBERRY PO) Take 1 Cap by mouth daily as needed.  gabapentin (NEURONTIN) 300 mg capsule Take 3 Caps by mouth nightly. Indications: Restless Legs Syndrome 270 Cap 2    levothyroxine (SYNTHROID) 100 mcg tablet TAKE 1 TABLET DAILY BEFORE BREAKFAST 90 Tab 3    PREMARIN 0.625 mg/gram vaginal cream PLEASE APPLY A PEA SIZED AMOUNT VAGINALLY AT BEDTIME FOR 30 DAYS, THEN TWICE WEEKLY  0    metFORMIN (GLUCOPHAGE) 500 mg tablet Take 500 mg by mouth two (2) times daily (with meals).         Patient Active Problem List   Diagnosis Code    HTN (hypertension) I10    Hypothyroidism E03.9    Hearing loss H91.90    Hypercholesteremia E78.00    Vertigo R42    RLS (restless legs syndrome) G25.81    Left hip pain M25.552    History of UTI Z87.440    Osteoarthrosis, unspecified whether generalized or localized, lower leg M17.10    Recurrent UTI N39.0    Essential hypertension I10    Type 2 diabetes mellitus without complication, without long-term current use of insulin (HCC) E11.9    Atrophic vaginitis N95.2    Type 2 diabetes mellitus with diabetic neuropathy (HCC) E11.40    Hematuria R31.9    Hyponatremia E87.1    Acute UTI N39.0    Hypokalemia E87.6    Weakness generalized R53.1    Pulmonary nodule R91.1    Type 2 diabetes with nephropathy (HCC) E11.21    Gross hematuria R31.0    Bilateral congenital primary hydronephrosis Q62.8    Vesicoureteral reflux N13.70    Urinary retention R33.9     PMH: reviewed medications and allergy lists and medical and family history. OBJECTIVE:  Awake and alert in no acute distress  Patient is crying intermittently   She is well groomed and casually dressed  Neck supple without lymphadenopathy, no carotid artery bruits auscultated bilaterally. No thyromegaly  Lungs clear throughout  S1 S2 RRR without ectopy or murmur auscultated. Visit Vitals    /61 (BP 1 Location: Left arm, BP Patient Position: Sitting)    Pulse 72    Temp 97.7 °F (36.5 °C) (Oral)    Resp 20    Ht 5' 4\" (1.626 m)    Wt 155 lb (70.3 kg)    SpO2 98%    BMI 26.61 kg/m2   Diagnoses and all orders for this visit:    1. Depression with anxiety  -     Increase escitalopram oxalate (LEXAPRO) 20 mg tablet; Take 1 Tab by mouth daily. 2. Bereavement reaction  -     LORazepam (ATIVAN) 0.5 mg tablet; Take 1 Tab by mouth every eight (8) hours as needed for Anxiety. Max Daily Amount: 1.5 mg. Anticipatory guidance regarding emergency care if depression symptoms worsen and or patient experiences suicidal ideation and patient verbalizes understanding. Patient agrees with plan and verbalizes understanding. I have discussed the diagnosis with the patient and the intended plan as seen in the above orders. The patient has received an after-visit summary and questions were answered concerning future plans. I have discussed medication side effects and warnings with the patient as well.     Follow-up Disposition:  Return in about 4 weeks (around 9/4/2018), or if symptoms worsen or fail to improve, for follow up anxiety and depression.

## 2018-08-07 NOTE — MR AVS SNAPSHOT
Axel Bauer 
 
 
 [address] [facility] [facility] [address] [address] 
[phone] Patient: Virgen Hughes MRN:  :[id number] Visit Information Date & Time Provider Department Dept. Phone Encounter #  
 2018 10:00 AM NANNETTE Mcadams 48 [address] [facility] [phone] [id number] Follow-up Instructions Return in about 4 weeks (around 2018) for follow up anxiety and depression. Your Appointments 9/10/2018 11:00 AM  
Office Visit with Gabriela Warren NP [facility] ([facility]) Appt Note: 3 mo f/u  
 [address], [facility] [address] [address]  
[phone]  
  
   
 [address]  
  
    
  
 10/8/2018  2:00 PM  
Any with John Paul Joseph MD  
[facility] ([facility]) Appt Note: EP-Return in about 8 weeks (around 2018) for follow up, with MD.  
  [facility] [address] Halina Lombard [address] [facility]  
  
   
  [facility] [address] [facility] [address] [place] [address] Upcoming Health Maintenance Date Due  
 BREAST CANCER SCRN MAMMOGRAM 2018 MICROALBUMIN Q1 2018 Influenza Age 5 to Adult 2018* HEMOGLOBIN A1C Q6M 2018 LIPID PANEL Q1 2018 EYE EXAM RETINAL OR DILATED Q1 2019 FOOT EXAM Q1 3/19/2019 MEDICARE YEARLY EXAM 5/10/2019 GLAUCOMA SCREENING Q2Y 2020 DTaP/Tdap/Td series (2 - Td) 2027 *Topic was postponed. The date shown is not the original due date. Allergies as of 2018  Review Complete On: 2018 By: Gabriela Warren NP Severity Noted Reaction Type Reactions Phenergan [Promethazine] High 2014   Side Effect Other (comments) Made patient very hyper and extremely agitated Current Immunizations  Reviewed on 2016 Name Date Influenza Vaccine PF 2013 Influenza Vaccine Split 10/7/2011 Not reviewed this visit You Were Diagnosed With   
  
 Codes Comments Depression with anxiety    -  Primary ICD-10-CM: F41.8 ICD-9-CM: 300.4 Bereavement reaction     ICD-10-CM: F43.20, Z63.4 ICD-9-CM: 309.0, V62.89 Vitals BP Pulse Temp Resp Height(growth percentile) Weight(growth percentile) 128/61 (BP 1 Location: Left arm, BP Patient Position: Sitting) 72 97.7 °F (36.5 °C) (Oral) 20 5' 4\" (1.626 m) 155 lb (70.3 kg) SpO2 BMI OB Status Smoking Status 98% 26.61 kg/m2 Postmenopausal Former Smoker BMI and BSA Data Body Mass Index Body Surface Area  
 26.61 kg/m 2 1.78 m 2 Preferred Pharmacy Pharmacy Name Phone [facility] [address] [facility], [address] [place] [address] [phone] Your Updated Medication List  
  
   
This list is accurate as of 8/7/18 10:44 AM.  Always use your most recent med list.  
  
  
  
  
 ciprofloxacin HCl 500 mg tablet Commonly known as:  CIPRO Take 1 Tab by mouth two (2) times a day. CYSTEX CRANBERRY PO Take 1 Cap by mouth daily as needed. escitalopram oxalate 20 mg tablet Commonly known as:  Guillermo Ximena Take 1 Tab by mouth daily. gabapentin 300 mg capsule Commonly known as:  NEURONTIN Take 3 Caps by mouth nightly. Indications: Restless Legs Syndrome  
  
 glipiZIDE 10 mg tablet Commonly known as:  Rumalda Jie Take 1 Tab by mouth two (2) times a day. hydroCHLOROthiazide 25 mg tablet Commonly known as:  HYDRODIURIL  
TAKE 1 TABLET DAILY  
  
 levothyroxine 100 mcg tablet Commonly known as:  SYNTHROID  
TAKE 1 TABLET DAILY BEFORE BREAKFAST LORazepam 0.5 mg tablet Commonly known as:  ATIVAN Take 1 Tab by mouth every eight (8) hours as needed for Anxiety. Max Daily Amount: 1.5 mg.  
  
 losartan 50 mg tablet Commonly known as:  COZAAR  
TAKE 1 TABLET DAILY  
  
 metFORMIN 500 mg tablet Commonly known as:  GLUCOPHAGE  
 Take 500 mg by mouth two (2) times daily (with meals). OTHER  
PLEASE CHECK URINE CULTURE AND SENSITIVITY - PENDING AT TIME OF DISCHARGE AT [facility] OTHER To PCP - Check Urine culture results from  [facility] - and change antibiotics as appropriate  - Patient has new Lung nodule - s/b [facility] - SHE NEEDS FOLLOW UP WITH THEM IN 1 -2 WEEKS  
  
 PREMARIN 0.625 mg/gram vaginal cream  
Generic drug:  conjugated estrogens PLEASE APPLY A PEA SIZED AMOUNT VAGINALLY AT BEDTIME FOR 30 DAYS, THEN TWICE WEEKLY  
  
 sucralfate 1 gram tablet Commonly known as:  [address] Take 1 Tab by mouth four (4) times daily. trimethoprim-sulfamethoxazole 160-800 mg per tablet Commonly known as:  BACTRIM DS, SEPTRA DS Take 1 Tab by mouth two (2) times a day. Prescriptions Sent to Pharmacy Refills  
 escitalopram oxalate (LEXAPRO) 20 mg tablet 1 Sig: Take 1 Tab by mouth daily. Class: Normal  
 [facility] [address] [place] [address] #: [phone] Route: Oral  
  
Follow-up Instructions Return in about 4 weeks (around 9/4/2018) for follow up anxiety and depression. Introducing [facility]! Dear Philip Galvez: 
Thank you for requesting a [employer] account. Our records indicate that you already have an active [employer] account. You can access your account anytime at [email or web address] Did you know that you can access your hospital and ER discharge instructions at any time in [employer]? You can also review all of your test results from your hospital stay or ER visit. Additional Information If you have questions, please visit the Frequently Asked Questions section of the [employer] website at [email or web address] Remember, [employer] is NOT to be used for urgent needs. For medical emergencies, dial 911. Now available from your iPhone and Android! Please provide this summary of care documentation to your next provider. Your primary care clinician is listed as Esther Ha. If you have any questions after today's visit, please call [phone].

## 2018-08-07 NOTE — PROGRESS NOTES
The following prescription was called into the pharmacy:    Ativan 0.5 mg tablet  Take one tablet by mouth every 8 hours as needed for Anxiety.  Max Daily Amount: 1.5 mg  Dispense 30  Refills 0   Called into [facility] 8/7/2018 1050 am to Pharmacist March Border per verbal order Deepika Beavers NP

## 2018-08-09 PROBLEM — S73.004A DISLOCATED HIP, RIGHT, INITIAL ENCOUNTER (HCC): Status: ACTIVE | Noted: 2018-01-01

## 2018-08-09 PROBLEM — T84.020A DISLOCATION OF INTERNAL RIGHT HIP PROSTHESIS (HCC): Status: ACTIVE | Noted: 2018-01-01

## 2018-08-09 NOTE — ROUTINE PROCESS
TRANSFER - OUT REPORT:    Verbal report given to Javi rachel Valle  being transferred to room 507 for routine progression of care       Report consisted of patients Situation, Background, Assessment and   Recommendations(SBAR). Information from the following report(s) SBAR, OR Summary, Intake/Output, MAR and Recent Results was reviewed with the receiving nurse. Opportunity for questions and clarification was provided.       Patient transported with:   O2 @ 2 liters  Registered Nurse  [facility]

## 2018-08-09 NOTE — BRIEF OP NOTE
BRIEF OPERATIVE NOTE    Date of Procedure: 8/9/2018   Preoperative Diagnosis: Dislocated right total hip replacement  Postoperative Diagnosis: Same  Procedure(s):  Closed reduction right total hip replacement   Surgeon(s) and Role:     * Maria Esther Antony MD    Surgical Staff:  Circ-1: Carmencita Asher RN  Radiology Technician: Lucille Keyes  Scrub Tech-1: [address]by [address]  Surg Asst-1: Shama Serra  No case tracking events are documented in the log.   Anesthesia: General   Estimated Blood Loss: none  Specimens: * No specimens in log *   Findings: above   Complications: none  Implants: * No implants in log *

## 2018-08-09 NOTE — ANESTHESIA PREPROCEDURE EVALUATION
Anesthetic History   No history of anesthetic complications            Review of Systems / Medical History  Patient summary reviewed and pertinent labs reviewed    Pulmonary  Within defined limits              Comments: Pulm. nodule   Neuro/Psych   Within defined limits           Cardiovascular    Hypertension          Hyperlipidemia    Exercise tolerance: <4 METS     GI/Hepatic/Renal  Within defined limits              Endo/Other    Diabetes: type 2  Hypothyroidism  Arthritis    Comments: Macular degeneration  [place] Other Findings   Comments: Documentation of current medication  Current medications obtained, documented and obtained? YES      Risk Factors for Postoperative nausea/vomiting:       History of postoperative nausea/vomiting? NO       Female? YES       Motion sickness? NO       Intended opioid administration for postoperative analgesia? NO      Smoking Abstinence:  Current Smoker? NO  Elective Surgery? NO  Seen preoperatively by anesthesiologist or proxy prior to day of surgery? YES  Pt abstained from smoking 24 hours prior to anesthesia?  N/A    Preventive care/screening for High Blood Pressure:  Aged 18 years and older: YES  Screened for high blood pressure: YES  Patients with high blood pressure referred to primary care provider   for BP management: YES           Physical Exam    Airway  Mallampati: II  TM Distance: 4 - 6 cm  Neck ROM: normal range of motion   Mouth opening: Normal     Cardiovascular  Regular rate and rhythm,  S1 and S2 normal,  no murmur, click, rub, or gallop  Rhythm: regular  Rate: normal         Dental    Dentition: Lower dentition intact and Upper dentition intact     Pulmonary  Breath sounds clear to auscultation               Abdominal  GI exam deferred       Other Findings            Anesthetic Plan    ASA: 3, emergent  Anesthesia type: MAC and general - backup          Induction: Intravenous  Anesthetic plan and risks discussed with: Patient

## 2018-08-09 NOTE — ED TRIAGE NOTES
Pt BIBA after losing her footing and falling to the floor. Pt reports not using her walker. Pt c/o right hip pain.

## 2018-08-09 NOTE — H&P
History and Physical    Patient: Bess Olivas MRN: [id number]  SSN: [id number]    YOB: 1935  Age: [de-identified] y.o. Sex: female      Subjective:      Bess Olivas is a [de-identified] y.o. female who is being seen for right hip pain s/p fall. Pt fell while getting out of bed his AM.  She twisted her right hip as she fell. She was unable to get up due to right hip pain so she stayed on the floor until her daughter found her about 4 hours later. X rays in ED revealed a dislocated R THR. Her right hip was replaced by Dr. Husam Navarro about 20 years ago. She lives alone. She underwent a recent J tube urological procedure.     Past Medical History:   Diagnosis Date    Allergies     Diabetes mellitus ([address] 1/11/2010    Hearing loss 1/11/2010    HTN (hypertension) 1/11/2010    Hypercholesteremia 1/11/2010    Hypothyroidism 1/11/2010    Macular degeneration     perryn  Dr. Jillian Leary     Left femural neck    Otosclerosis     Left ear    Panic attack     Thyroid disease     hypothyroid    Unspecified adverse effect of anesthesia     hard to wake up in the past. Had own blood transfusion and was better     Past Surgical History:   Procedure Laterality Date    HX HEENT      Left stapedectomy 10/03    HX HEENT  7/2012    eye lids lifted Dr. Lesli Veloz    HX MALIGNANT SKIN LESION EXCISION  07/26/2013    Upper arm basal cell skin cancer removal    HX ORTHOPAEDIC      Right hip replacement 2004, left 2015      Family History   Problem Relation Age of Onset    Breast Cancer Sister 70    Heart Attack Father     Heart Attack Daughter 52     March 2018     Social History   Substance Use Topics    Smoking status: Former Smoker     Packs/day: 1.00     Years: 20.00     Types: Cigarettes     Quit date: 8/27/1995    Smokeless tobacco: Never Used    Alcohol use No      Current Facility-Administered Medications   Medication Dose Route Frequency Provider Last Rate Last Dose    morphine injection 2 mg  2 mg IntraVENous Q2H PRN Tyron Suazo MD   2 mg at 08/09/18 1417    sodium chloride (NS) flush 5-10 mL  5-10 mL IntraVENous Q8H Mainor Pines, CRNA        sodium chloride (NS) flush 5-10 mL  5-10 mL IntraVENous PRN Mainor [place], CRNA        glucose chewable tablet 16 g  4 Tab Oral PRN Mainor [place], CRNA        glucagon (GLUCAGEN) injection 1 mg  1 mg IntraMUSCular PRN Mainor [place], CRNA        dextrose (D50W) injection syrg 12.5-25 g  25-50 mL IntraVENous PRN Mainor Pines, CRNA        0.9% sodium chloride infusion  75 mL/hr IntraVENous CONTINUOUS Mainor [place], CRNA 75 mL/hr at 08/09/18 1708 75 mL/hr at 08/09/18 1708        Allergies   Allergen Reactions    Phenergan [Promethazine] Other (comments)     Made patient very hyper and extremely agitated         Review of Systems:  A comprehensive review of systems was negative except for that written in the History of Present Illness. Objective:     Vitals:    08/09/18 1333 08/09/18 1721   BP: 118/69 124/73   Pulse: 65 62   Resp: 22 18   Temp: 98.1 °F (36.7 °C) 98.5 °F (36.9 °C)   SpO2: 98% 98%   Weight: 155 lb (70.3 kg)    Height: 5' 4\" (1.626 m)         Physical Exam:  General:  Alert, cooperative, no distress, appears stated age. Eyes:  Conjunctivae/corneas clear. PERRL, EOMs intact. Fundi benign   Ears:  Normal TMs and external ear canals both ears. Nose: Nares normal. Septum midline. Mucosa normal. No drainage or sinus tenderness. Mouth/Throat: Lips, mucosa, and tongue normal. Teeth and gums normal.   Neck: Supple, symmetrical, trachea midline, no adenopathy, thyroid: no enlargment/tenderness/nodules, no carotid bruit and no JVD. Back:   Symmetric, no curvature. ROM normal. No CVA tenderness. Lungs:   Clear to auscultation bilaterally. Heart:  Regular rate and rhythm, S1, S2 normal, no murmur, click, rub or gallop. Abdomen:   Soft, non-tender. Bowel sounds normal. No masses,  No organomegaly.    Extremities: Pain with any attempted right hip ROM. Right leg internally rotated and shortened   Pulses: 2+ and symmetric all extremities. Skin: Skin color, texture, turgor normal. No rashes or lesions   Lymph nodes: Cervical, supraclavicular, and axillary nodes normal.   Neurologic: CNII-XII intact. Normal strength, sensation and reflexes throughout. XR RIGHT HIP MV 8/9/18      FINDINGS:      - Both hip joints have been replaced. - There is an acute posterior dislocation of the right prosthetic hip. The  right femoral head replacement prosthetic component is located along the  posterior margin of the right acetabular component. - The left hip joint prosthetic components appear well seated and are in  anatomic alignment. Assessment:     Hospital Problems  Date Reviewed: 8/7/2018          Codes Class Noted POA    * (Principal)Dislocation of internal right hip prosthesis ([facility] ICD-10-CM: C97.755G  ICD-9-CM: 996.42, V43.64  8/9/2018 Unknown        Type 2 diabetes mellitus without complication, without long-term current use of insulin ([facility] ICD-10-CM: E11.9  ICD-9-CM: 250.00  11/7/2016 Yes        Vertigo ICD-10-CM: R42  ICD-9-CM: 780.4  1/28/2011 Yes    Overview Signed 1/28/2011  9:23 AM by Ching Bennett NP     Currently under care Dr. Trina Ken MRI brain normal.             HTN (hypertension) ICD-10-CM: I10  ICD-9-CM: 401.9  1/11/2010 Yes        Hearing loss ICD-10-CM: H91.90  ICD-9-CM: 389.9  1/11/2010 Yes              Plan: To OR for closed reduction right hip  Risks of surgery outlined and informed consent obtained.       Signed By: Janna Oglesby MD     August 9, 2018

## 2018-08-09 NOTE — ED PROVIDER NOTES
EMERGENCY DEPARTMENT HISTORY AND PHYSICAL EXAM    1:26 PM      Date: 8/9/2018  Patient Name: Thelma Nobles    History of Presenting Illness     Chief Complaint   Patient presents with    Hip Pain         History Provided By: Patient    Chief Complaint: Hip pain  Duration: 2 Hours  Timing:  Constant  Location: Right hip  Quality: Aching  Severity: 10 out of 10  Modifying Factors: Pain is exacerbated by movements and palpation  Associated Symptoms: Patient denies head injury and any other associated symptoms or complaints. Additional History (Context): Thelma Nobles is a [de-identified] y.o. female with a past medical history of otosclerosis, HTN, DM, hypercholesteremia, and hypothyroidism who presents with c/o right hip pain due to a fall onset 2 hours ago. Patient reports that she fell out of bed this morning. She is complaining of right leg pain and right hip pain. She describes her pain as rated 10/10, constant, and is exacerbated by movement and palpation. She has a history of bilateral hip surgeries. Patient does not smoke or drink. NKDA. Patient denies head injury and any other associated symptoms or complaints.     PCP: Harjeet Arroyo NP    Current Facility-Administered Medications   Medication Dose Route Frequency Provider Last Rate Last Dose    morphine injection 2 mg  2 mg IntraVENous Q2H PRN Agata Kothari MD   2 mg at 08/09/18 1417    sodium chloride (NS) flush 5-10 mL  5-10 mL IntraVENous PRN Nichol Adams CRNA        0.9% sodium chloride infusion  75 mL/hr IntraVENous CONTINUOUS Nichol Adams CRNA        fentaNYL citrate (PF) injection 50 mcg  50 mcg IntraVENous Multiple Nichol Adams CRNA        ondansetron [facility]) injection 4 mg  4 mg IntraVENous ONCE Nichol Adams CRNA        diphenhydrAMINE (BENADRYL) injection 12.5 mg  12.5 mg IntraVENous Multiple Nichol Adams CRNA        nalbuphine (NUBAIN) injection 5 mg  5 mg IntraVENous Multiple Nichol Adams CRNA        glucose chewable tablet 16 g  4 Tab Oral PRN Charlene Chavez CRNA        glucagon [facility]) injection 1 mg  1 mg IntraMUSCular PRN Charlene Chavez CRNA        dextrose (D50W) injection syrg 12.5-25 g  25-50 mL IntraVENous PRN Charlene Chavez CRNA        insulin lispro (HUMALOG) injection   SubCUTAneous ONCE Charlene Chavez CRNA           Past History     Past Medical History:  Past Medical History:   Diagnosis Date    Allergies     Diabetes mellitus ([address] 1/11/2010    Hearing loss 1/11/2010    HTN (hypertension) 1/11/2010    Hypercholesteremia 1/11/2010    Hypothyroidism 1/11/2010    Macular degeneration     shanae Rivas     Left femural neck    Otosclerosis     Left ear    Panic attack     Thyroid disease     hypothyroid    Unspecified adverse effect of anesthesia     hard to wake up in the past. Had own blood transfusion and was better       Past Surgical History:  Past Surgical History:   Procedure Laterality Date    HX HEENT      Left stapedectomy 10/03    HX HEENT  7/2012    eye lids lifted Dr. Nikos Feng    HX MALIGNANT SKIN [address] [place] [address]  07/26/2013    Upper arm basal cell skin cancer removal    HX ORTHOPAEDIC      Right hip replacement 2004, left 2015       Family History:  Family History   Problem Relation Age of Onset    Breast Cancer Sister 70    Heart Attack Father     Heart Attack Daughter 52     March 2018       Social History:  Social History   Substance Use Topics    Smoking status: Former Smoker     Packs/day: 1.00     Years: 20.00     Types: Cigarettes     Quit date: 8/27/1995    Smokeless tobacco: Never Used    Alcohol use No       Allergies: Allergies   Allergen Reactions    Phenergan [Promethazine] Other (comments)     Made patient very hyper and extremely agitated           Review of Systems       Review of Systems   Constitutional: Negative for activity change, fatigue and fever. HENT: Negative for congestion and rhinorrhea.     Eyes: Negative for visual disturbance. Respiratory: Negative for shortness of breath. Cardiovascular: Negative for chest pain and palpitations. Gastrointestinal: Negative for abdominal pain, diarrhea, nausea and vomiting. Genitourinary: Negative for dysuria and hematuria. Musculoskeletal: Positive for arthralgias (right hip pain). Negative for back pain. Skin: Negative for rash. Neurological: Negative for dizziness, weakness and light-headedness. All other systems reviewed and are negative. Physical Exam     Visit Vitals    /76    Pulse 69    Temp 97.6 °F (36.4 °C)    Resp 15    Ht 5' 4\" (1.626 m)    Wt 70.3 kg (155 lb)    SpO2 99%    BMI 26.61 kg/m2         Physical Exam   Constitutional: She is oriented to person, place, and time. She appears well-developed and well-nourished. She appears distressed. In pain   HENT:   Head: Normocephalic and atraumatic. Right Ear: External ear normal.   Left Ear: External ear normal.   Nose: Nose normal.   Mouth/Throat: Oropharynx is clear and moist.   Eyes: Conjunctivae and EOM are normal. Pupils are equal, round, and reactive to light. No scleral icterus. Neck: Normal range of motion. Neck supple. No JVD present. No tracheal deviation present. No thyromegaly present. FROM, no midline pain    Cardiovascular: Normal rate, regular rhythm, normal heart sounds and intact distal pulses. Exam reveals no gallop and no friction rub. No murmur heard. Pulmonary/Chest: Effort normal and breath sounds normal. She exhibits no tenderness. Abdominal: Soft. Bowel sounds are normal. She exhibits no distension. There is no tenderness. There is no rebound and no guarding. Musculoskeletal: She exhibits tenderness. She exhibits no edema. Holding R hip in flexion, will not straighten, L LE with FROM, distal pulses equal   Lymphadenopathy:     She has no cervical adenopathy. Neurological: She is alert and oriented to person, place, and time.  No cranial nerve deficit. Coordination normal.   Otherwise limited due to pain    Skin: Skin is warm and dry. Psychiatric:   No family currently at the bedside    Nursing note and vitals reviewed. Diagnostic Study Results     Labs -  Recent Results (from the past 12 hour(s))   CBC WITH AUTOMATED DIFF    Collection Time: 08/09/18  2:27 PM   Result Value Ref Range    WBC 9.7 4.6 - 13.2 K/uL    RBC 4.39 4.20 - 5.30 M/uL    HGB 12.9 12.0 - 16.0 g/dL    HCT 35.8 35.0 - 45.0 %    MCV 81.5 74.0 - 97.0 FL    MCH 29.4 24.0 - 34.0 PG    MCHC 36.0 31.0 - 37.0 g/dL    RDW 13.8 11.6 - 14.5 %    PLATELET 677 559 - 568 K/uL    MPV 9.4 9.2 - 11.8 FL    NEUTROPHILS 88 (H) 40 - 73 %    LYMPHOCYTES 8 (L) 21 - 52 %    MONOCYTES 4 3 - 10 %    EOSINOPHILS 0 0 - 5 %    BASOPHILS 0 0 - 2 %    ABS. NEUTROPHILS 8.5 (H) 1.8 - 8.0 K/UL    ABS. LYMPHOCYTES 0.8 (L) 0.9 - 3.6 K/UL    ABS. MONOCYTES 0.4 0.05 - 1.2 K/UL    ABS. EOSINOPHILS 0.0 0.0 - 0.4 K/UL    ABS.  BASOPHILS 0.0 0.0 - 0.1 K/UL    DF AUTOMATED     METABOLIC PANEL, BASIC    Collection Time: 08/09/18  2:27 PM   Result Value Ref Range    Sodium 129 (L) 136 - 145 mmol/L    Potassium 3.8 3.5 - 5.5 mmol/L    Chloride 95 (L) 100 - 108 mmol/L    CO2 21 21 - 32 mmol/L    Anion gap 13 3.0 - 18 mmol/L    Glucose 216 (H) 74 - 99 mg/dL    BUN 18 7.0 - 18 MG/DL    Creatinine 1.37 (H) 0.6 - 1.3 MG/DL    BUN/Creatinine ratio 13 12 - 20      GFR est AA 45 (L) >60 ml/min/1.73m2    GFR est non-AA 37 (L) >60 ml/min/1.73m2    Calcium 8.3 (L) 8.5 - 10.1 MG/DL   PROTHROMBIN TIME + INR    Collection Time: 08/09/18  2:27 PM   Result Value Ref Range    Prothrombin time 12.9 11.5 - 15.2 sec    INR 1.0 0.8 - 1.2     PTT    Collection Time: 08/09/18  2:27 PM   Result Value Ref Range    aPTT 27.8 23.0 - 36.4 SEC   URINALYSIS W/ RFLX MICROSCOPIC    Collection Time: 08/09/18  4:50 PM   Result Value Ref Range    Color YELLOW      Appearance TURBID      Specific gravity 1.011 1.005 - 1.030      pH (UA) 6.0 5.0 - 8.0      Protein 300 (A) NEG mg/dL    Glucose NEGATIVE  NEG mg/dL    Ketone NEGATIVE  NEG mg/dL    Bilirubin NEGATIVE  NEG      Blood LARGE (A) NEG      Urobilinogen 0.2 0.2 - 1.0 EU/dL    Nitrites NEGATIVE  NEG      Leukocyte Esterase LARGE (A) NEG     URINE MICROSCOPIC ONLY    Collection Time: 08/09/18  4:50 PM   Result Value Ref Range    WBC TOO NUMEROUS TO COUNT 0 - 4 /hpf    RBC  0 - 5 /hpf     UNABLE TO QUANTITATE MICROSCOPIC PARAMETERS DUE TO EXCESSIVE WBCS   GLUCOSE, POC    Collection Time: 08/09/18  5:07 PM   Result Value Ref Range    Glucose (POC) 201 (H) 70 - 110 mg/dL   GLUCOSE, POC    Collection Time: 08/09/18  7:04 PM   Result Value Ref Range    Glucose (POC) 123 (H) 70 - 110 mg/dL       Radiologic Studies -   XR HIP RT W OR WO PELV 2-3 VWS   Final Result      XR CHEST SNGL V   Final Result      XR HIP RT W OR WO PELV  1 VW    (Results Pending)   NC XR TECHNOLOGIST SERVICE    (Results Pending)     Xr Chest Sngl V    Result Date: 8/9/2018  PROCEDURE:  Chest Portable CPT code 99699 INDICATION:  Preoperative evaluation. COMPARISON:  4/29/18 FINDINGS:  Thin curvilinear density in the left lower lung zone, suggestive of focal scarring. The remainder of the lungs is clear. No pleural effusion is noted. The cardiomediastinal silhouette appears unremarkable. IMPRESSION: 1. Thin focal scarring band in the left lower lung zone. 2.  No acute airspace disease. Xr Hip Rt W Or Wo Pelv 2-3 Vws    Result Date: 8/9/2018  PROCEDURE:  Right hip series. INDICATION:  Lost footing and fell onto floor. Right hip pain. COMPARISON:  Plain films 11/3/14, CT 7/5/18. FINDINGS: - Both hip joints have been replaced. - There is an acute posterior dislocation of the right prosthetic hip. The right femoral head replacement prosthetic component is located along the posterior margin of the right acetabular component. - The left hip joint prosthetic components appear well seated and are in anatomic alignment. IMPRESSION:  Posterior dislocation of the prosthetic right hip. Medical Decision Making   I am the first provider for this patient. I reviewed the vital signs, available nursing notes, past medical history, past surgical history, family history and social history. Vital Signs-Reviewed the patient's vital signs. Pulse Oximetry Analysis -  98% on room air (normal)    Records Reviewed: Nursing Notes (Time of Review: 1:26 PM)  XR hip R;  Superior and posterior dislocation Piotr Calderon DO     ED Course: Progress Notes, Reevaluation, and Consults:  4:13 PM Consult: I discussed care with Dr. Alice Gardner (orthopedic surgery. ). It was a standard discussion including patient history, chief complaint, available diagnostic results, and predicted treatment course. Dr. Alice Gardner will take patient to the operating room. Dr. Shaniqua Altman will decide if admission is needed after the sx. Provider Notes (Medical Decision Making): Pt is an [de-identified] female with a hx of dyslipidemia, DM, thyroid disease, anxiety presents after a slip and fall. Pt fell out of bed with acute pain to the R hip. Pt denies head injury or LOC. Pt has had surgery on both hips. Will XR R hip, CXR, diet NPO then reevaluate. Piotr Calderon DO 2:32 PM      Diagnosis     Clinical Impression:   1. Dislocation of prosthetic joint, initial encounter ([facility]    2. Dislocated hip, right, initial encounter ([facility]        Disposition: admit to OR     Follow-up Information     None           Current Discharge Medication List      CONTINUE these medications which have NOT CHANGED    Details   LORazepam (ATIVAN) 0.5 mg tablet Take 1 Tab by mouth every eight (8) hours as needed for Anxiety. Max Daily Amount: 1.5 mg.  Qty: 30 Tab, Refills: 0    Associated Diagnoses: Bereavement reaction      escitalopram oxalate (LEXAPRO) 20 mg tablet Take 1 Tab by mouth daily.   Qty: 90 Tab, Refills: 1    Associated Diagnoses: Depression with anxiety      sucralfate (CARAFATE) 1 gram tablet Take 1 Tab by mouth four (4) times daily. Qty: 30 Tab, Refills: 0      glipiZIDE (GLUCOTROL) 10 mg tablet Take 1 Tab by mouth two (2) times a day. Qty: 60 Tab, Refills: 2    Associated Diagnoses: Type 2 diabetes mellitus without complication, without long-term current use of insulin (HCC)      PREMARIN 0.625 mg/gram vaginal cream PLEASE APPLY A PEA SIZED AMOUNT VAGINALLY AT BEDTIME FOR 30 DAYS, THEN TWICE WEEKLY  Refills: 0      !! OTHER PLEASE CHECK URINE CULTURE AND SENSITIVITY - PENDING AT TIME OF DISCHARGE AT [facility]  Qty: 1 Act, Refills: 0      !! OTHER To PCP  - Check Urine culture results from [facility] - and change antibiotics as appropriate   - Patient has new Lung nodule - s/b [facility] [place] [facility] - SHE NEEDS FOLLOW UP WITH THEM IN 1 -2 WEEKS  Qty: 1 Act, Refills: 0      losartan (COZAAR) 50 mg tablet TAKE 1 TABLET DAILY  Qty: 90 Tab, Refills: 1    Associated Diagnoses: Orthostatic hypotension      hydroCHLOROthiazide (HYDRODIURIL) 25 mg tablet TAKE 1 TABLET DAILY  Qty: 90 Tab, Refills: 1      metFORMIN (GLUCOPHAGE) 500 mg tablet Take 500 mg by mouth two (2) times daily (with meals). CRAN/VITC/MANNOSE/FOS/BROMELN (CYSTEX CRANBERRY PO) Take 1 Cap by mouth daily as needed. gabapentin (NEURONTIN) 300 mg capsule Take 3 Caps by mouth nightly. Indications: Restless Legs Syndrome  Qty: 270 Cap, Refills: 2    Associated Diagnoses: RLS (restless legs syndrome)      levothyroxine (SYNTHROID) 100 mcg tablet TAKE 1 TABLET DAILY BEFORE BREAKFAST  Qty: 90 Tab, Refills: 3    Associated Diagnoses: Hypothyroidism, unspecified type       !! - Potential duplicate medications found.  Please discuss with provider.        _______________________________    Attestations:  Esther Case acting as a scribe for and in the presence of Genna Sicard, MD      August 09, 2018 at [facility]       Provider Attestation:      I personally performed the services described in the documentation, reviewed the documentation, as recorded by the scribe in my presence, and it accurately and completely records my words and actions.  August 09, 2018 at 1:26 PM - Kirsten Heath MD    _______________________________

## 2018-08-09 NOTE — CONSULTS
History and Physical    Patient: Dmitri Gaming MRN: [id number]  SSN: [id number]    YOB: 1935  Age: [de-identified] y.o. Sex: female      Subjective:      Dmitri Gaming is a [de-identified] y.o. female who is being seen for right hip pain s/p fall. Pt fell while getting out of bed his AM.  She twisted her right hip as she fell. She was unable to get up due to right hip pain so she stayed on the floor until her daughter found her about 4 hours later. X rays in ED revealed a dislocated R THR. Her right hip was replaced by Dr. Natanael Solomon about 20 years ago. She lives alone. She underwent a recent J tube urological procedure.     Past Medical History:   Diagnosis Date    Allergies     Diabetes mellitus ([address] 1/11/2010    Hearing loss 1/11/2010    HTN (hypertension) 1/11/2010    Hypercholesteremia 1/11/2010    Hypothyroidism 1/11/2010    Macular degeneration     shanae Daniels     Left femural neck    Otosclerosis     Left ear    Panic attack     Thyroid disease     hypothyroid    Unspecified adverse effect of anesthesia     hard to wake up in the past. Had own blood transfusion and was better     Past Surgical History:   Procedure Laterality Date    HX HEENT      Left stapedectomy 10/03    HX HEENT  7/2012    eye lids lifted Dr. Marilu Shelton    HX MALIGNANT SKIN LESION EXCISION  07/26/2013    Upper arm basal cell skin cancer removal    HX ORTHOPAEDIC      Right hip replacement 2004, left 2015      Family History   Problem Relation Age of Onset    Breast Cancer Sister 70    Heart Attack Father     Heart Attack Daughter 52 March 2018     Social History   Substance Use Topics    Smoking status: Former Smoker     Packs/day: 1.00     Years: 20.00     Types: Cigarettes     Quit date: 8/27/1995    Smokeless tobacco: Never Used    Alcohol use No      Current Facility-Administered Medications   Medication Dose Route Frequency Provider Last Rate Last Dose    morphine injection 2 mg  2 mg IntraVENous Q2H PRN Ehsan Al MD   2 mg at 08/09/18 1417    sodium chloride (NS) flush 5-10 mL  5-10 mL IntraVENous Q8H William Borrego CRNA        sodium chloride (NS) flush 5-10 mL  5-10 mL IntraVENous PRN William Borrego CRNA        glucose chewable tablet 16 g  4 Tab Oral PRN William Borrego CRNA        glucagon (GLUCAGEN) injection 1 mg  1 mg IntraMUSCular PRN William Borrego CRNA        dextrose (D50W) injection syrg 12.5-25 g  25-50 mL IntraVENous PRN William Borrego CRNA        0.9% sodium chloride infusion  75 mL/hr IntraVENous CONTINUOUS William Elmo CRNA 75 mL/hr at 08/09/18 1708 75 mL/hr at 08/09/18 1708        Allergies   Allergen Reactions    Phenergan [Promethazine] Other (comments)     Made patient very hyper and extremely agitated         Review of Systems:  A comprehensive review of systems was negative except for that written in the History of Present Illness. Objective:     Vitals:    08/09/18 1333 08/09/18 1721   BP: 118/69 124/73   Pulse: 65 62   Resp: 22 18   Temp: 98.1 °F (36.7 °C) 98.5 °F (36.9 °C)   SpO2: 98% 98%   Weight: 155 lb (70.3 kg)    Height: 5' 4\" (1.626 m)         Physical Exam:  General:  Alert, cooperative, no distress, appears stated age. Eyes:  Conjunctivae/corneas clear. PERRL, EOMs intact. Fundi benign   Ears:  Normal TMs and external ear canals both ears. Nose: Nares normal. Septum midline. Mucosa normal. No drainage or sinus tenderness. Mouth/Throat: Lips, mucosa, and tongue normal. Teeth and gums normal.   Neck: Supple, symmetrical, trachea midline, no adenopathy, thyroid: no enlargment/tenderness/nodules, no carotid bruit and no JVD. Back:   Symmetric, no curvature. ROM normal. No CVA tenderness. Lungs:   Clear to auscultation bilaterally. Heart:  Regular rate and rhythm, S1, S2 normal, no murmur, click, rub or gallop. Abdomen:   Soft, non-tender. Bowel sounds normal. No masses,  No organomegaly.    Extremities: Pain with any attempted right hip ROM. Right leg internally rotated and shortened   Pulses: 2+ and symmetric all extremities. Skin: Skin color, texture, turgor normal. No rashes or lesions   Lymph nodes: Cervical, supraclavicular, and axillary nodes normal.   Neurologic: CNII-XII intact. Normal strength, sensation and reflexes throughout. XR RIGHT HIP MV 8/9/18      FINDINGS:      - Both hip joints have been replaced. - There is an acute posterior dislocation of the right prosthetic hip. The  right femoral head replacement prosthetic component is located along the  posterior margin of the right acetabular component. - The left hip joint prosthetic components appear well seated and are in  anatomic alignment. Assessment:     Hospital Problems  Date Reviewed: 8/7/2018          Codes Class Noted POA    * (Principal)Dislocation of internal right hip prosthesis ([facility] ICD-10-CM: M52.652M  ICD-9-CM: 996.42, V43.64  8/9/2018 Unknown        Type 2 diabetes mellitus without complication, without long-term current use of insulin ([facility] ICD-10-CM: E11.9  ICD-9-CM: 250.00  11/7/2016 Yes        Vertigo ICD-10-CM: R42  ICD-9-CM: 780.4  1/28/2011 Yes    Overview Signed 1/28/2011  9:23 AM by Natali Blake NP     Currently under care Dr. Emili De La Cruz MRI brain normal.             HTN (hypertension) ICD-10-CM: I10  ICD-9-CM: 401.9  1/11/2010 Yes        Hearing loss ICD-10-CM: H91.90  ICD-9-CM: 389.9  1/11/2010 Yes              Plan: To OR for closed reduction right hip  Risks of surgery outlined and informed consent obtained.       Signed By: Surendra Bone MD     August 9, 2018

## 2018-08-10 NOTE — PROGRESS NOTES
[facility]  [address], [facility]  [phone] Office  [phone] Fax                  Orthopaedic Surgery Daily Progress Note      Subjective: [de-identified] y.o., female, 1 Day Post-Op, Procedure(s):  RIGHT DISLOCATED ARTIFICIAL HIP/C-ARM/FRACTURE TABLE      [x]I have reviewed the flowsheet and previous days notes. Events overnight reviewed and discussed with nursing staff. Vital signs and records reviewed.     Vital Signs:  Visit Vitals    BP 98/59 (BP 1 Location: Right arm, BP Patient Position: Sitting)    Pulse 69    Temp 99 °F (37.2 °C)    Resp 16    Ht 5' 4\" (1.626 m)    Wt 155 lb (70.3 kg)    SpO2 93%    BMI 26.61 kg/m2       Pain Scale:    4/10 AT REST    8/10 With ACTIVITY    [  ] Lolly Leyva, [  Emerald Bonnie, [  ]Stabbing, [  ]Shooting, [  ] Briana Show, [  ] Electrical     [  Adiel Bright         [  Ana Numbers   [  ] Left  [XX] Hip           [XX]Right  [  ] Left  [  ] Hand        [  Ana Numbers  [  ] Left  [  ] Wrist        [  Ana Numbers  [  ] Left  [  ] Bird Love       [  Ana Numbers  [  ] Left  [  ] Shoulder  [  Ana Numbers  [  ] Left  [  ] Ankle       [  Ana Numbers   [  ] Left   [  ] Mykelena Meckel          [  Ana Numbers  [  ] Left         Critical / Reportable event(s) occurring  past 24 hours: Pain right hip      Review of Systems:      Pain as above,   [No] CP, [No] SOB, [No] F/C/NS, [No] Headache,  [  ]Cough (  ) Productive / (   ) Non Productive  [No] dizziness, (+) voiding [  ] chung, [No] Stool, (+) Flatus  [  ] numbness / tingling, [No] double nor blurry vision    Lab Results   Component Value Date/Time    Specimen Description: URINE 2010 03:43 PM    Lab Results   Component Value Date/Time    Culture result: >100,000 COLONIES/mL STAPHYLOCOCCUS AURICULARIS (A) 2018 10:03 AM    Culture result: NO GROWTH 1 DAY 2018 10:30 AM    Culture result: >100,000 COLONIES/mL PSEUDOMONAS AERUGINOSA (A) 2018 06:25 AM    Culture result:  2018 10:30 AM     17070  COLONIES/mL  MIXED GRAM POSITIVE KARLIE, PROBABLE SKIN/GENITAL CONTAMINATION. Culture result: >100,000 COLONIES/mL KLEBSIELLA PNEUMONIAE (A) 11/20/2017 12:00 PM        Labs:  Recent Results (from the past 24 hour(s))   CBC WITH AUTOMATED DIFF    Collection Time: 08/09/18  2:27 PM   Result Value Ref Range    WBC 9.7 4.6 - 13.2 K/uL    RBC 4.39 4.20 - 5.30 M/uL    HGB 12.9 12.0 - 16.0 g/dL    HCT 35.8 35.0 - 45.0 %    MCV 81.5 74.0 - 97.0 FL    MCH 29.4 24.0 - 34.0 PG    MCHC 36.0 31.0 - 37.0 g/dL    RDW 13.8 11.6 - 14.5 %    PLATELET 857 069 - 249 K/uL    MPV 9.4 9.2 - 11.8 FL    NEUTROPHILS 88 (H) 40 - 73 %    LYMPHOCYTES 8 (L) 21 - 52 %    MONOCYTES 4 3 - 10 %    EOSINOPHILS 0 0 - 5 %    BASOPHILS 0 0 - 2 %    ABS. NEUTROPHILS 8.5 (H) 1.8 - 8.0 K/UL    ABS. LYMPHOCYTES 0.8 (L) 0.9 - 3.6 K/UL    ABS. MONOCYTES 0.4 0.05 - 1.2 K/UL    ABS. EOSINOPHILS 0.0 0.0 - 0.4 K/UL    ABS.  BASOPHILS 0.0 0.0 - 0.1 K/UL    DF AUTOMATED     METABOLIC PANEL, BASIC    Collection Time: 08/09/18  2:27 PM   Result Value Ref Range    Sodium 129 (L) 136 - 145 mmol/L    Potassium 3.8 3.5 - 5.5 mmol/L    Chloride 95 (L) 100 - 108 mmol/L    CO2 21 21 - 32 mmol/L    Anion gap 13 3.0 - 18 mmol/L    Glucose 216 (H) 74 - 99 mg/dL    BUN 18 7.0 - 18 MG/DL    Creatinine 1.37 (H) 0.6 - 1.3 MG/DL    BUN/Creatinine ratio 13 12 - 20      GFR est AA 45 (L) >60 ml/min/1.73m2    GFR est non-AA 37 (L) >60 ml/min/1.73m2    Calcium 8.3 (L) 8.5 - 10.1 MG/DL   PROTHROMBIN TIME + INR    Collection Time: 08/09/18  2:27 PM   Result Value Ref Range    Prothrombin time 12.9 11.5 - 15.2 sec    INR 1.0 0.8 - 1.2     PTT    Collection Time: 08/09/18  2:27 PM   Result Value Ref Range    aPTT 27.8 23.0 - 36.4 SEC   URINALYSIS W/ RFLX MICROSCOPIC    Collection Time: 08/09/18  4:50 PM   Result Value Ref Range    Color YELLOW      Appearance TURBID      Specific gravity 1.011 1.005 - 1.030      pH (UA) 6.0 5.0 - 8.0      Protein 300 (A) NEG mg/dL    Glucose NEGATIVE  NEG mg/dL    Ketone NEGATIVE  NEG mg/dL    Bilirubin NEGATIVE  NEG      Blood LARGE (A) NEG      Urobilinogen 0.2 0.2 - 1.0 EU/dL    Nitrites NEGATIVE  NEG      Leukocyte Esterase LARGE (A) NEG     URINE MICROSCOPIC ONLY    Collection Time: 08/09/18  4:50 PM   Result Value Ref Range    WBC TOO NUMEROUS TO COUNT 0 - 4 /hpf    RBC  0 - 5 /hpf     UNABLE TO QUANTITATE MICROSCOPIC PARAMETERS DUE TO EXCESSIVE WBCS   GLUCOSE, POC    Collection Time: 08/09/18  5:07 PM   Result Value Ref Range    Glucose (POC) 201 (H) 70 - 110 mg/dL   GLUCOSE, POC    Collection Time: 08/09/18  7:04 PM   Result Value Ref Range    Glucose (POC) 123 (H) 70 - 110 mg/dL   GLUCOSE, POC    Collection Time: 08/09/18  9:55 PM   Result Value Ref Range    Glucose (POC) 128 (H) 70 - 110 mg/dL   GLUCOSE, POC    Collection Time: 08/10/18  5:53 AM   Result Value Ref Range    Glucose (POC) 118 (H) 70 - 110 mg/dL       Procedure(s): Wound site(s) examined:   [  ] surgical dressing, [  ] splint/brace, [  ] removed    Objective findings / Data:   Patient is awake, pleasant, and cooperative sociable and oriented  x 3, sitting up in chair, daughter at bedside, RLE straight leg immobilizer intact well padded distally. no calf pain nor evidence DVT Left LE, distal NVI fully Jj LE, cap refill < 2 sec Jj LE.     DIAGNOSIS / IMPRESSIONS:    1 Day Post-Op, Procedure(s):  RIGHT DISLOCATED ARTIFICIAL HIP/C-ARM/FRACTURE TABLE  - stable  Post Operative / Post Procedural Pain -  Poorly managed on opiate analgesics, recommend change meds  Post Procedure / Operative Range of Motion - working with PT/OT per protocol  Anemia - [XX] Chronic, [  ] Acute Post Op Blood Loss    Patient Active Problem List   Diagnosis Code    HTN (hypertension) I10    Hypothyroidism E03.9    Hearing loss H91.90    Hypercholesteremia E78.00    Vertigo R42    RLS (restless legs syndrome) G25.81    Left hip pain M25.552    Osteoarthrosis, unspecified whether generalized or localized, lower leg M17.10    Essential hypertension I10    Type 2 diabetes mellitus without complication, without long-term current use of insulin ([facility]) E11.9    Atrophic vaginitis N95.2    Type 2 diabetes mellitus with diabetic neuropathy ([facility]) E11.40    Hyponatremia E87.1    Hypokalemia E87.6    Pulmonary nodule R91.1    Type 2 diabetes with nephropathy ([facility]) E11.21    Gross hematuria R31.0    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 Justification for continued stay: Fair progression towards established rehabilitation goals. 2. Medical Issues being followed closely:   [X] Fall and safety precautions    Eriphyle.Splinter ] Wound Care per protocol   [X] Bowel and Bladder Function    [X] Fluid Electrolyte and Nutrition Balance    [X] Pain Control per protocol   3. Issues that 24 hour nursing is following:   [X] Fall and safety precautions    [X] Wound Care    [X] Bowel and Bladder Function    [X] Fluid Electrolyte and Nutrition Balance    [X] Pain Control    [X] Assistance with and education on in-room safety with transfers to and from the bed, wheelchair, toilet and shower. 4. Plan of care:   [X] Continue current care    [X] Physical Therapy    [X] Occupational Therapy    [X] Thrombo Embolism Prophylaxis     [X] Discharge planning per patient protocol  [   ] Discharge / Transfer from Acute Stay [facility] to [  ] Home,  [  ] SNF   5. Medications:   [X] MAR Reviewed    [X] Continue Present Medications      6.  DVT Prophylaxis:   [ ] Lovenox    [ ] Unfractionated Heparin    [ ] Coumadin    [ ] Xarelto   [ ]ASA  [XX] Eliquis   [ ] BHARATHI Stockings    [ ] Sequential Compression Device(s): [  ]Full calf  [  ]Foot only   [XX] Ambulation        Orthopaedically, this patient is stable and their recovery is on track for a successful outcome per established rehabilitation goals. [No] Additional Orthopaedic surgical intervention(s) are warranted /  indicated at this time. Medicine, will continue to follow the patient as necessary. Thromboembolism prophylaxis will continue per protocol and noted above. Physical and Occupational therapies will continue per protocol(s):    [XX] Full WBAT, [ ] Partial WBAT, [ ] Toetouch WB, [ ] Non Weight Bearing:     [ ] RUE [XX] RLE  [ ] LUE  [ ] LLE    ================================================================        I have independently examined the patient and reviewed all lab studies and imgaing as well as review of nursing notes and physican notes from the past 24 hours. The plan of care has been discussed with the following below and all questions are answered.      Case and finding(s) discussed with attending Orthopaedic Surgeon and:    [  Tristian Reid and or attending Speciatist  [XX]Patient   [XX]Family   [  ]Nursing   [  ]Case Management      Medical Decision Making     Comprehensive review of patients current presenting condition(s) with a thorough review of the patient's previously known comorbidities:    Patient Active Problem List   Diagnosis Code    HTN (hypertension) I10    Hypothyroidism E03.9    Hearing loss H91.90    Hypercholesteremia E78.00    Vertigo R42    RLS (restless legs syndrome) G25.81    Left hip pain M25.552    Osteoarthrosis, unspecified whether generalized or localized, lower leg M17.10    Essential hypertension I10    Type 2 diabetes mellitus without complication, without long-term current use of insulin ([facility]) E11.9    Atrophic vaginitis N95.2    Type 2 diabetes mellitus with diabetic neuropathy ([address] E11.40    Hyponatremia E87.1    Hypokalemia E87.6    Pulmonary nodule R91.1    Type 2 diabetes with nephropathy ([address] E11.21    Gross hematuria R31.0    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 T84.020A    Dislocated hip, right, initial encounter [facility]) S73.004A           Per [employer] Protocol this case was discussed with attending surgeon, and reflects the attending surgeon's agreement with orders and (plan of care)  as confirmed by their cosignature.            Dixon Esteban, APA, APC, MPAS,  Orthopaedic Surgical Physician Assistant-C  Department of [facility]     Recommend for placement SNF.     8/10/2018  1:21 PM

## 2018-08-10 NOTE — ROUTINE PROCESS
Bedside and Verbal shift change report given to Xavier Dejesus RN (oncoming nurse) by Beau Duran RN (offgoing nurse). Report included the following information SBAR, Kardex, MAR and Recent Results.     SITUATION:    Code Status: Prior   Reason for Admission: DX   Dislocated hip, right, initial encounter [facility])    [facility] day: 1   Problem List:       Hospital Problems  Date Reviewed: 8/7/2018          Codes Class Noted POA    * (Principal)Dislocation of internal right hip prosthesis ([facility] ICD-10-CM: T84.020A  ICD-9-CM: 996.42, V43.64  8/9/2018 Unknown        Dislocated hip, right, initial encounter [facility]) ICD-10-CM: S73.004A  ICD-9-CM: 835.00  8/9/2018 Unknown        Type 2 diabetes mellitus without complication, without long-term current use of insulin ([facility] ICD-10-CM: E11.9  ICD-9-CM: 250.00  11/7/2016 Yes        Vertigo ICD-10-CM: R42  ICD-9-CM: 780.4  1/28/2011 Yes    Overview Signed 1/28/2011  9:23 AM by Salud Trevino NP     Currently under care Dr. Cassidy Desouza MRI brain normal.             HTN (hypertension) ICD-10-CM: I10  ICD-9-CM: 401.9  1/11/2010 Yes        Hearing loss ICD-10-CM: H91.90  ICD-9-CM: 389.9  1/11/2010 Yes              BACKGROUND:    Past Medical History:   Past Medical History:   Diagnosis Date    Allergies     Diabetes mellitus ([facility] 1/11/2010    Hearing loss 1/11/2010    HTN (hypertension) 1/11/2010    Hypercholesteremia 1/11/2010    Hypothyroidism 1/11/2010    Macular degeneration     shanae Kee     Left femural neck    Otosclerosis     Left ear    Panic attack     Thyroid disease     hypothyroid    Unspecified adverse effect of anesthesia     hard to wake up in the past. Had own blood transfusion and was better         Patient taking anticoagulants no     ASSESSMENT:    Changes in Assessment Throughout Shift: post-op     Patient has Central Line: no Reasons if yes:    Patient has Navarro Cath: no Reasons if yes:       Last Vitals:     Vitals: 08/09/18 1948 08/09/18 2000 08/10/18 0111 08/10/18 0400   BP: 130/51 129/77 114/74 94/57   Pulse: 76 60 60 (!) 56   Resp: 18 18 16 18   Temp: 99 °F (37.2 °C) 98.8 °F (37.1 °C) 97.8 °F (36.6 °C) 97.8 °F (36.6 °C)   SpO2: 99% 99% 98% 98%   Weight:       Height:            IV and DRAINS (will only show if present)   Peripheral IV 08/09/18 Left Arm-Site Assessment: Clean, dry, & intact     WOUND (if present)   Wound Type:  none   Dressing present Dressing Present : No   Wound Concerns/Notes:  none     PAIN    Pain Assessment    Pain Intensity 1: 0 (08/10/18 0401)    Pain Location 1: Hip    Pain Intervention(s) 1: Medication (see MAR)    Patient Stated Pain Goal: 0  o Interventions for Pain:  See mar  o Intervention effective: yes  o Time of last intervention: see mar   o Reassessment Completed: yes      Last 3 Weights:  Last 3 Recorded Weights in this Encounter    08/09/18 1333   Weight: 70.3 kg (155 lb)     Weight change:      INTAKE/OUPUT    Current Shift: 08/09 1901 - 08/10 0700  In: 815.8 [P.O.:120; I.V.:695.8]  Out: 100 [Urine:100]    Last three shifts:       LAB RESULTS     Recent Labs      08/09/18   1427   WBC  9.7   HGB  12.9   HCT  35.8   PLT  317        Recent Labs      08/09/18   1427   NA  129*   K  3.8   GLU  216*   BUN  18   CREA  1.37*   CA  8.3*   INR  1.0       RECOMMENDATIONS AND DISCHARGE PLANNING     1. Pending tests/procedures/ Plan of Care or Other Needs: none     2. Discharge plan for patient and Needs/Barriers: SNF    3. Estimated Discharge Date: unknown Posted on Whiteboard in Patients Room: yes      4. The patient's care plan was reviewed with the oncoming nurse. \"HEALS\" SAFETY CHECK      Fall Risk    Total Score: 4    Safety Measures: Safety Measures: Bed/Chair-Wheels locked, Bed in low position, Call light within reach, Gripper socks, Side rails X 3    A safety check occurred in the patient's room between off going nurse and oncoming nurse listed above.     The safety check included the below items  Area Items   H  High Alert Medications - Verify all high alert medication drips (heparin, PCA, etc.)   E  Equipment - Suction is set up for ALL patients (with fermin)  - Red plugs utilized for all equipment (IV pumps, etc.)  - WOWs wiped down at end of shift.  - Room stocked with oxygen, suction, and other unit-specific supplies   A  Alarms - Bed alarm is set for fall risk patients  - Ensure chair alarm is in place and activated if patient is up in a chair   L  Lines - Check IV for any infiltration  - Navarro bag is empty if patient has a Navarro   - Tubing and IV bags are labeled   S  Safety   - Room is clean, patient is clean, and equipment is clean. - Hallways are clear from equipment besides carts. - Fall bracelet on for fall risk patients  - Ensure room is clear and free of clutter  - Suction is set up for ALL patients (with fermin)  - Hallways are clear from equipment besides carts.    - Isolation precautions followed, supplies available outside room, sign posted     Stefan Hernandez RN

## 2018-08-10 NOTE — PROGRESS NOTES
met with patient, completed the initial Spiritual Assessment of the patient, and offered Pastoral Care, see flow sheets for interventions. Patient said she is in pain today. She was grimacing. Pastoral support provided and  spoke briefly with nurse about her pain. Patient does not have any Tenriism/cultural needs that will affect patients preferences in health care. Patient said her daughter is supportive. Patient is [place]. She said she lives alone but thinks it may be time to live in an assisted living facility. Chart reviewed. Chaplains will continue to follow and will provide pastoral care on an as needed/as requested basis. Benny Mejia MDiv.   Board Certified [employer] [phone]

## 2018-08-10 NOTE — PROGRESS NOTES
Problem: Mobility Impaired (Adult and Pediatric)  Goal: *Acute Goals and Plan of Care (Insert Text)  Physical Therapy Goals  Initiated 8/10/2018 and to be accomplished within 7 day(s)  1. Patient will move from supine to sit and sit to supine  and scoot up and down in bed with supervision/set-up. 2.  Patient will transfer from bed to chair and chair to bed with supervision/set-up using the least restrictive device. 3.  Patient will perform sit to stand with supervision/set-up. 4.  Patient will ambulate with supervision/set-up for >150 feet with the least restrictive device. 5.  Patient will ascend/descend 1 curb step using least restrictive device with minimal assistance/contact guard assist.  Outcome: Progressing Towards Goal  physical Therapy EVALUATION    Patient: Caitlin Saldivar (35 y.o. female)  Date: 8/10/2018  Primary Diagnosis: DX  Dislocated hip, right, initial encounter ([facility] [address]  Procedure(s) (LRB):  RIGHT DISLOCATED ARTIFICIAL HIP/C-ARM/FRACTURE TABLE  (Right) 1 Day Post-Op   Precautions: Fall, WBAT    OBJECTIVE/ASSESSMENT :  Patient is pleasant but drowsy [de-identified] yo F admitted to hospital for reduction of R KENNY (20 years ago) after sustaining mechanical fall at home. Patient presents today semi-reclined in bed, sleeping but rousable to gentle tactile stimulation and agreeable to therapy. Patient was educated on weight bearing status, hip precautions, and role of therapy. Patient transferred to sitting EOB for objective assessment with ModA for LE management and trunk assist. Patient was given demo with instruction on sit <> stand transfer and gait training. Patient transferred to standing with ModA and ambulated 7 ft to locked recliner with ModA progressing to Alexandrea for RW management and sequencing. Patient demonstrated good compliance with precautions throughout session.  At conclusion of session patient transferred to sitting in recliner and was left resting with call bell by the side, LE elevated, SCDs donned, and daughter in room. Patient instructed to call for assistance if they needed to get up for any reason and denied need for further assistance. Patient demonstrates decreased strength, mobility, and endurance and will benefit from skilled intervention to address the above impairments. Patients rehabilitation potential is considered to be Good  Factors which may influence rehabilitation potential include:   []         None noted  []         Mental ability/status  []         Medical condition  []         Home/family situation and support systems  []         Safety awareness  []         Pain tolerance/management  []         Other:      PLAN :  Recommendations and Planned Interventions:  [x]           Bed Mobility Training             [x]    Neuromuscular Re-Education  [x]           Transfer Training                   []    Orthotic/Prosthetic Training  [x]           Gait Training                          []    Modalities  [x]           Therapeutic Exercises          []    Edema Management/Control  [x]           Therapeutic Activities            [x]    Patient and Family Training/Education  []           Other (comment):    Frequency/Duration: Patient will be followed by physical therapy 1-2 times per day, 4-7 days per week to address goals. Discharge Recommendations: Rehab  Further Equipment Recommendations for Discharge: rolling walker     SUBJECTIVE:   Patient stated I need to get back to the bed.  Patient encouraged to sit up for lunch, daughter in room also encouraging patient to stay up in chair.     OBJECTIVE DATA SUMMARY:     Past Medical History:   Diagnosis Date    Allergies     Diabetes mellitus ([facility] [address] 1/11/2010    Hearing loss 1/11/2010    HTN (hypertension) 1/11/2010    Hypercholesteremia 1/11/2010    Hypothyroidism 1/11/2010    Macular degeneration     shanae Maria Fuelling     Left femural neck    Otosclerosis     Left ear    Panic attack     Thyroid disease hypothyroid    Unspecified adverse effect of anesthesia     hard to wake up in the past. Had own blood transfusion and was better     Past Surgical History:   Procedure Laterality Date    HX HEENT      Left stapedectomy 10/03    HX HEENT  7/2012    eye lids lifted Dr. Davis Pillai HX MALIGNANT SKIN LESION EXCISION  07/26/2013    Upper arm basal cell skin cancer removal    HX ORTHOPAEDIC      Right hip replacement 2004, left 2015     Barriers to Learning/Limitations: yes;  altered mental status (i.e.Sedation, Confusion)  Compensate with: Visual Cues, Verbal Cues and Tactile Cues  Prior Level of Function/Home Situation: Patient lives alone in single story condo (on 13th floor with elevator). She was ambulatory with either SPC or RW prior to admission, was independent with all ADLs. Home Situation  Home Environment: Other (comment) (Condo)  One/Two Story Residence: Other (Comment) (13th floor with elevator)  Living Alone: Yes  Support Systems: Child(constance), Family member(s)  Patient Expects to be Discharged to[de-identified] Skilled nursing facility  Current DME Used/Available at Home: [employer], straight, Walker, rolling  Critical Behavior:  Neurologic State: Alert  Psychosocial  Patient Behaviors: Calm; Cooperative  Family  Behaviors: Calm;Supportive  Strength:    Strength: Generally decreased, functional (BLE)  Tone & Sensation:   Tone: Normal (BLE)  Sensation: Intact (BLE)   Range Of Motion:  AROM: Generally decreased, functional (RLE in knee immobilizer)  Functional Mobility:  Bed Mobility:  Supine to Sit: Minimum assistance; Moderate assistance  Scooting: Minimum assistance  Transfers:  Sit to Stand: Moderate assistance  Stand to Sit: Minimum assistance  Balance:   Sitting: Impaired  Sitting - Static: Good (unsupported)  Sitting - Dynamic: Fair (occasional)  Standing: Impaired; With support  Standing - Static: Fair  Standing - Dynamic : Fair  Ambulation/Gait Training:  Distance (ft): 7 Feet (ft)  Assistive Device: Walker, rolling (R knee immobilizer in place)  Ambulation - Level of Assistance: Moderate assistance;Minimal assistance  Base of Support: Widened;Shift to left  Speed/Janice: Slow  Pain:  Pre session: 0/10  Post session: 3/10 R hip  Activity Tolerance:   Fair/good  Please refer to the flowsheet for vital signs taken during this treatment. After treatment:   [x] Patient left in no apparent distress sitting up in chair  [] Patient left sitting on EOB  [] Patient left in no apparent distress in bed  [] Patient declined to be OOB at this time due to  [x] Call bell left within reach  [x] Nursing notified Laylaleroy Amezcuaelle)  [x] Caregiver present  [] Bed alarm activated  [x] SCDs in place    COMMUNICATION/EDUCATION:   [x]         Fall prevention education was provided and the patient/caregiver indicated understanding. [x]         Patient/family have participated as able in goal setting and plan of care. [x]         Patient/family agree to work toward stated goals and plan of care. []         Patient understands intent and goals of therapy, but is neutral about his/her participation. []         Patient is unable to participate in goal setting and plan of care. Thank you for this referral.  Claudeen Lao   Time Calculation: 25 mins      Mobility  Current  CK= 40-59%   Goal  CI= 1-19%. The severity rating is based on the Level of Assistance required for Functional Mobility and ADLs.     Eval Complexity: History: MEDIUM  Complexity : 1-2 comorbidities / personal factors will impact the outcome/ POC Exam:MEDIUM Complexity : 3 Standardized tests and measures addressing body structure, function, activity limitation and / or participation in recreation  Presentation: MEDIUM Complexity : Evolving with changing characteristics  Clinical Decision Making:Medium Complexity   Overall Complexity:MEDIUM

## 2018-08-10 NOTE — DIABETES MGMT
Glycemic Control Plan of Care    T2DM with current A1c of 7.7% (6/08/2018). Home diabetes med: Glipizide 10 mg twice daily. Patient reported that she was not on Metformin anymore prior to this hospital admission. POC BG range on 8/09/2018: 123-201 mg/dL. POC BG report on 8/10/2018 at time of review: 118 mg/dL. Noted patient was experiencing pain/discomfort this AM.    Recommendation(s):  1.) Discontinue metformin because patient not taking this medicine PTA. Discontinue (hold) Glipizide. Oral diab meds not recommended while in patient per Standards of Care Diabetes 2018. Resume Glipizide at discharge. Recommend correctional lispro insulin at this time while in house. Called Kody Walker, with Dr. Omar Lucas - and obtained order. Assessment:  Patient is [de-identified]year old with past medical history including type 2 diabetes mellitus, hypertension, hypothyroidism, hearing loss, and  Osteoporosis - was admitted on 8/09/2018 after a fall at home while getting out of bed and then c/o left hip pain. Noted:  Dislocation right hip replacement. Status post closed reduction right total hip replacement  T2DM with current A1c of 7.7% (06/08/2018). Most recent blood glucose values:    Results for Cornell Penn (MRN [id number]) as of 8/10/2018 10:24   Ref. Range 8/9/2018 17:07 8/9/2018 19:04 8/9/2018 21:55   GLUCOSE,FAST - POC Latest Ref Range: 70 - 110 mg/dL 201 (H) 123 (H) 128 (H)     Results for Cornell Penn (MRN [id number]) as of 8/10/2018 10:24   Ref. Range 8/10/2018 05:53   GLUCOSE,FAST - POC Latest Ref Range: 70 - 110 mg/dL 118 (H)     Current A1C: 7.7% (06/08/2018) which is equivalent to estimated average blood glucose of 174 mg/dL during the past 2-3 months. Current hospital diabetes medications:  Correctional lispro insulin ACHS. Normal sensitivity dose. Total daily dose insulin requirement previous day: 8/09/2018  Lispro: 6 units.     Home diabetes medications: Patient reported on 8/10/2018:  Glipizide 10 mg twice daily. Patient reported that she is no longer taking Metformin at home. Diet: Diabetes  Consistent carb regular. Goals:  Blood glucose will be within target range of  mg/dL by 8/13/2018. Education:  ___  Refer to Diabetes Education Record             _X__  Education not indicated at this time: 9/10/2018. Patient in pain unable to participate in assessment of home diabetes management at this time but reported that she is not on Metformin at home. She is only taking Glipizide twice daily.     Juliana Alba RN [facility]  Pager: [phone]

## 2018-08-10 NOTE — PROGRESS NOTES
Reason for Admission:   Dislocated hip, right, initial encounter               RRAT Score:    32              Resources/supports as identified by patient/family:                   Top Challenges facing patient (as identified by patient/family and CM): Finances/Medication cost?                    Transportation? Support system or lack thereof? Living arrangements? Resides alone in apartment           Self-care/ADLs/Cognition? Alert and oriented. Current Advanced Directive/Advance Care Plan:                            Plan for utilizing home health: Yes                      Likelihood of readmission: Red/High                 Transition of Care Plan:    Uncertain at this time. Care Management Interventions  PCP Verified by CM: Yes  Physical Therapy Consult: Yes  Occupational Therapy Consult: Yes  Current Support Network: Lives Alone  Confirm Follow Up Transport: Family  Plan discussed with Pt/Family/Caregiver: Yes         Pt expresses to be in pain when CM visited with her. Pt resides alone and she is uncertain of her plans for discharge at this time.

## 2018-08-10 NOTE — PROGRESS NOTES
Weight bearing and ambulation orders received, however patient will require IP Consult to PT or PT Eval and Treat order prior to initiation of PT services.     Thank you, Teressa Tena PT, DPT

## 2018-08-10 NOTE — ANESTHESIA POSTPROCEDURE EVALUATION
Post-Anesthesia Evaluation and Assessment    Patient: Yun Fish MRN: [id number]  SSN: [id number]    YOB: 1935  Age: [de-identified] y.o. Sex: female       Cardiovascular Function/Vital Signs  Visit Vitals    /74 (BP Patient Position: At rest)    Pulse 60    Temp 36.6 °C (97.8 °F)    Resp 16    Ht 5' 4\" (1.626 m)    Wt 70.3 kg (155 lb)    SpO2 98%    BMI 26.61 kg/m2       Patient is status post MAC, general - backup anesthesia for Procedure(s):  RIGHT DISLOCATED ARTIFICIAL HIP/C-ARM/FRACTURE TABLE . Nausea/Vomiting: None    Postoperative hydration reviewed and adequate. Pain:  Pain Scale 1: Numeric (0 - 10) (08/10/18 0111)  Pain Intensity 1: 0 (08/10/18 0111)   Managed    Neurological Status:   Neuro (WDL): Within Defined Limits (08/09/18 1848)   At baseline    Mental Status and Level of Consciousness: Arousable    Pulmonary Status:   O2 Device: Nasal cannula (08/10/18 0111)   Adequate oxygenation and airway patent    Complications related to anesthesia: None    Post-anesthesia assessment completed.  No concerns    Signed By: Jamal Gordon MD     August 10, 2018

## 2018-08-10 NOTE — OP NOTES
[facility] REPORT    Radha Ortega  MR#: [id number]  : 1935  ACCOUNT #: [de-identified]   DATE OF SERVICE: 2018    PREOPERATIVE DIAGNOSIS:  Dislocated right total hip replacement. POSTOPERATIVE DIAGNOSIS: Dislocated right total hip replacement. PROCEDURE PERFORMED:  Closed reduction right total hip replacement. SURGEON:  Jolanta Glover MD    ANESTHESIA:  General.    CONDITION:  Good. ESTIMATED BLOOD LOSS:  None. SPECIMENS REMOVED:  None. ASSISTANT:  Laury Yap. COMPLICATIONS: none    IMPLANTS:  None. DESCRIPTION OF PROCEDURE:  Under satisfactory general anesthesia,  the patient was initially placed on the fracture table with both legs in stirrups traction. The patient's right lower extremity was gently manipulated with traction and rotation. The femoral head was able to be brought to the level of the acetabulum, but would not reduce with gentle traction and rotation. The patient's traction was then released. Her hip was then gently manipulated in flexion with a traction force applied in an anterior direction. The hip reduced with an audible and palpable clunk. After reduction, the leg lengths were found to be equal and her hip rotation was restored. Her right lower extremity was placed in a well-padded knee immobilizer. Her legs were kept abducted with soft pillows and the patient was transported back to the recovery room in good condition.       Maryam DELCID / AGNIESZKA  D: 2018 18:45     T: 08/10/2018 12:03  JOB #: [id number]

## 2018-08-11 NOTE — ROUTINE PROCESS
Patient is alert and oriented times three with no signs or symptoms of distress. Pulses are palpable in operative leg.

## 2018-08-11 NOTE — ROUTINE PROCESS
Mobility Intervention:       [x] Pt dangled at edge of bed    [x] Pt assisted OOB to bedside commode    [] Pt assisted OOB to chair    [] Pt ambulated to bathroom    [] Patient was ambulated in room/hallway    Assistive Device Utilized:       [x] Rolling walker   [] Crutches   [] Straight Cane   [] Knee immobilizer   [] IV pole    After Mobilization:     [x] Patient left in no apparent distress sitting up in chair  [] Patient left in no apparent distress in bed  [] Call bell left within reach  [x] SCDs on & machine turned on  [] Ice applied  [] RN notified  [] Caregiver present  [] Bed alarm activated    Reason patient not mobilized:      [] Patient refused   [] Nausea/vomiting   [] Low blood pressure   [] Drowsy/lethargic    Pain Rating:     [x] 0  [] 1  Assistive Device:        [] 2  [] 3  [] 4  [] 5  [] 6  Assistive Device:        [] 7  [] 8  [] 9  [] 10    Comments:

## 2018-08-11 NOTE — ROUTINE PROCESS
Patient is alert and oriented times three with no signs or symptoms of distress with daughters at the bedside. Leg has positive pulses and immoblizer is intact.

## 2018-08-11 NOTE — ROUTINE PROCESS
Patient is alert and oriented 2. Patient appeared to be confused at times, dressing clean dry and intact with knee immobilizer in place, lungs clear bilaterally, bowel sounds active in all quadrants. Patient is voiding without difficulties, tolerating regular food well, no complain of nausea or vomiting. Patient denies any acute distress,SOB and chest pain. Patient in stable condition,vitals WNL, no apparent distress noted, no neurological deficit noted, instruct patient to use the ICS, patient demonstrate appropriately. Educate patient on fall precaution, and the need to call for assistance. Some evidence of learning. Placed call bell within reach, will continue to monitor. Held BP med this morning, Curtis HILLS was made aware.

## 2018-08-11 NOTE — ROUTINE PROCESS
Told multiple times by patient to leave her alone she is sleeping told by dayshift nurse that she has periods of confusion seth at night.

## 2018-08-11 NOTE — PROGRESS NOTES
Problem: Self Care Deficits Care Plan (Adult)  Goal: *Acute Goals and Plan of Care (Insert Text)  Occupational Therapy Goals  Initiated 8/11/2018 within 7 day(s). 1.  Patient will perform lower body dressing with minimal assistance/contact guard assist   2. Patient will perform grooming with modified independence while sitting on the edge of the bed  3. Patient will maneuver on and off BSC with mod assist  Occupational Therapy EVALUATION    Patient: Bess Olivas (5 y.o. female)  Date: 8/11/2018  Primary Diagnosis: DX  Dislocated hip, right, initial encounter ([facility] [address]  Procedure(s) (LRB):  RIGHT DISLOCATED ARTIFICIAL HIP/C-ARM/FRACTURE TABLE  (Right) 2 Days Post-Op   Precautions:  Fall, WBAT    ASSESSMENT :  Based on the objective data described below, the patient presents with poor functional mobility and generalized weakness which limits her self care skills. She has a friend who is insisting on feeding her (including in midst of MMT of her UE's). This patient has the skills to complete her own self feeding task. She requires max assist of 2 to 3 people to get out of bed this morning per PT. Patient will benefit from skilled intervention to address the above impairments.   Patients rehabilitation potential is considered to be Good  Factors which may influence rehabilitation potential include:   []             None noted  []             Mental ability/status  []             Medical condition  []             Home/family situation and support systems  []             Safety awareness  []             Pain tolerance/management  []             Other:      PLAN :  Recommendations and Planned Interventions:  []               Self Care Training                  []        Therapeutic Activities  []               Functional Mobility Training    []        Cognitive Retraining  []               Therapeutic Exercises           []        Endurance Activities  []               Balance Training                   [] Neuromuscular Re-Education  []               Visual/Perceptual Training     []   Home Safety Training  []               Patient Education                 []        Family Training/Education  []               Other (comment):    Frequency/Duration: Patient will be followed by occupational therapy 1-2 times per day/4-7 days per week to address goals. Discharge Recommendations: Navdeep Calvo  Further Equipment Recommendations for Discharge: N/A     Barriers to Learning/Limitations: None    PATIENT COMPLEXITY      Eval Complexity: History: LOW Complexity : Brief history review ; Examination: LOW Complexity : 1-3 performance deficits relating to physical, cognitive , or psychosocial skils that result in activity limitations and / or participation restrictions ; Decision Making:LOW Complexity : No comorbidities that affect functional and no verbal or physical assistance needed to complete eval tasks  Assessment: LOW Complexity     G-CODES:     Self Care  Current  CL= 60-79%   Goal  CJ= 20-39%. The severity rating is based on the Level of Assistance required for Functional Mobility and ADLs. SUBJECTIVE:   Patient's friend stated She needs to know people love her or she might not work hard.     OBJECTIVE DATA SUMMARY:     Past Medical History:   Diagnosis Date    Allergies     Diabetes mellitus ([facility] [address] 1/11/2010    Hearing loss 1/11/2010    HTN (hypertension) 1/11/2010    Hypercholesteremia 1/11/2010    Hypothyroidism 1/11/2010    Macular degeneration     shanae Daniels     Left femural neck    Otosclerosis     Left ear    Panic attack     Thyroid disease     hypothyroid    Unspecified adverse effect of anesthesia     hard to wake up in the past. Had own blood transfusion and was better     Past Surgical History:   Procedure Laterality Date    HX HEENT      Left stapedectomy 10/03    HX HEENT  7/2012    eye lids lifted Dr. Hilton Nathan EXCISION  07/26/2013    Upper arm basal cell skin cancer removal    HX ORTHOPAEDIC      Right hip replacement 2004, left 2015     Prior Level of Function/Home Situation:   Home Situation  Home Environment: Other (comment) (Condo)  One/Two Story Residence: Other (Comment) (13th floor with elevator)  Living Alone: Yes  Support Systems: Child(constance), Family member(s)  Patient Expects to be Discharged to[de-identified] Skilled nursing facility  Current DME Used/Available at Home: cesar Hernandez, [place], rolling  [x]  Right hand dominant   []  Left hand dominant  Cognitive/Behavioral Status:  Neurologic State: Alert;Confused  Oriented to person only this morning  Skin: no skin integrity issues noted during OT evaluation  Edema: no extremity edema noted during OT evaluation  Vision/Perceptual:      tracking is [facility]     Coordination:   BUE's WFL   Balance: per PT  Sitting: Intact; With support  Standing: Impaired; With support  Strength:  BUE's; 3 to 3+/5   Tone & Sensation:  BUE's WFL   Range of Motion:  BUE's AROM is WFL   Functional Mobility and Transfers for ADLs:  Bed Mobility: per PT  Supine to Sit: Moderate assistance   Transfers:  Sit to Stand: Maximum assistance;Assist x2  Stand Pivot Transfers: Maximum assistance  Bed to Chair: Assist x2   ADL Assessment:   self feeding: she has the abilities to self feed although she is preferring her friend feed her at this time  Grooming: modified independent (simulated at seated level)  UB bathing/dressing: independent (simulated at seating level)  LB bathing/dressing: dependent (she can't reach past her knees)   Pain:  Pt reports 0/10 pain or discomfort prior to treatment.    Pt reports 0/10 pain or discomfort post treatment. Activity Tolerance:   No SOB noted  Please refer to the flowsheet for vital signs taken during this treatment.   After treatment:   [] Patient left in no apparent distress sitting up in chair  [x] Patient left in no apparent distress in bed  [] Call bell left within reach  [x] Nursing notified  [] Caregiver present  [] Bed alarm activated    COMMUNICATION/EDUCATION:   [] Home safety education was provided and the patient/caregiver indicated understanding. [] Patient/family have participated as able in goal setting and plan of care. [x] Patient/family agree to work toward stated goals and plan of care. [] Patient understands intent and goals of therapy, but is neutral about his/her participation. [] Patient is unable to participate in goal setting and plan of care.     Thank you for this referral.  Dominique Angela, OTR/L  Time Calculation: 14 mins

## 2018-08-11 NOTE — PROGRESS NOTES
Problem: Mobility Impaired (Adult and Pediatric)  Goal: *Acute Goals and Plan of Care (Insert Text)  Physical Therapy Goals  Initiated 8/10/2018 and to be accomplished within 7 day(s)  1. Patient will move from supine to sit and sit to supine  and scoot up and down in bed with supervision/set-up. 2.  Patient will transfer from bed to chair and chair to bed with supervision/set-up using the least restrictive device. 3.  Patient will perform sit to stand with supervision/set-up. 4.  Patient will ambulate with supervision/set-up for >150 feet with the least restrictive device. 5.  Patient will ascend/descend 1 curb step using least restrictive device with minimal assistance/contact guard assist.   Outcome: Progressing Towards Goal  physical Therapy TREATMENT    Patient: Nan Valle (74 y.o. female)  Date: 8/11/2018  Diagnosis: DX  Dislocated hip, right, initial encounter ([facility] [address] Dislocation of internal right hip prosthesis ([facility] [address]  Procedure(s) (LRB):  RIGHT DISLOCATED ARTIFICIAL HIP/C-ARM/FRACTURE TABLE  (Right) 2 Days Post-Op  Precautions: Fall, WBAT  Chart, physical therapy assessment, plan of care and goals were reviewed. ASSESSMENT:  Initial attempt for PT at 720 AM unsuccessful due to patient sleeping and UA to arouse. At second attempt patient up in bed with breakfast tray however not eating. Nursing notes attempt to feed with patient not swallowing. .. Patient agreeable for PT treatment and to sit up in the chair. She was mod A for supine to sit and then Max A X 2 (to 3 with nursing and aide) for stand pivot bed to chair. It is unclear if  WB status was maintained . She did have right LE immobilizer brace in place. She was left sitting up in the chair with her tray and vitals being taken. Staff shown that chair was in proper position for return chair to bed transfer. She will need A X2 .   Progression toward goals:  []      Improving appropriately and progressing toward goals  [x]      Improving slowly and progressing toward goals  []      Not making progress toward goals and plan of care will be adjusted     PLAN:  Patient continues to benefit from skilled intervention to address the above impairments. Continue treatment per established plan of care. Discharge Recommendations:  Navdeep Calvo  Further Equipment Recommendations for Discharge:  N/A     G-CODES:     Mobility  Current  CM= 80-99%   Goal  CJ= 20-39%. The severity rating is based on the Level of Assistance required for Functional Mobility and ADLs. SUBJECTIVE:   Patient stated no pain.     OBJECTIVE DATA SUMMARY:   Critical Behavior:  Neurologic State: Alert, Confused  Orientation Level: Oriented X4  Cognition: Appropriate decision making, Appropriate for age attention/concentration, Appropriate safety awareness  Safety/Judgement: Awareness of environment, Fall prevention, Decreased insight into deficits  Functional Mobility Training:  Bed Mobility:     Supine to Sit: Moderate assistance   Transfers:  Sit to Stand: Maximum assistance;Assist x2  Stand to Sit: Maximum assistance;Assist x2  Stand Pivot Transfers: Maximum assistance     Bed to Chair: Assist x2   Balance:  Sitting: Intact; With support  Standing: Impaired; With support  Ambulation/Gait Training:   Stairs:   Neuro Re-Education:     Therapeutic Exercises:   Reviewed AP in chair  Pain: denies pain yet makes groaning/grunting noises  Pt reports 0/10 pain or discomfort prior to treatment.    Pt reports 0/10 pain or discomfort post treatment. Activity Tolerance:   leanne fairly  Please refer to the flowsheet for vital signs taken during this treatment.   After treatment:   [x] Patient left in no apparent distress sitting up in chair  [] Patient left in no apparent distress in bed  [x] Call bell left within reach  [x] Nursing notified  [] Caregiver present  [] Bed alarm activated      Sesar Glover, PT   Time Calculation: 20 mins

## 2018-08-11 NOTE — PROGRESS NOTES
[facility]    Progress Note      Patient: Thelma Nobles               Sex: female          DOA: 8/9/2018         YOB: 1935      Age:  [de-identified] y.o.        LOS:  LOS: 2 days         S/P  Procedure(s):  RIGHT DISLOCATED ARTIFICIAL HIP/C-ARM/FRACTURE TABLE                Subjective: Moderate pain    Denies cp, sob, abd pain   Objective:      Blood pressure 96/61, pulse 94, temperature (!) 101.3 °F (38.5 °C), resp. rate 20, height 5' 4\" (1.626 m), weight 155 lb (70.3 kg), SpO2 92 %.    Well developed, Well Nourished alert, cooperative, no distress    no deformity noted in right lower extremity  Sensory is intact   Motor is intact  nv intact  2+distal pulses  Neg calf tenderness    Labs:  CBC  @  CBC:   Lab Results   Component Value Date/Time    WBC 9.7 08/09/2018 02:27 PM    RBC 4.39 08/09/2018 02:27 PM    HGB 12.9 08/09/2018 02:27 PM    HCT 35.8 08/09/2018 02:27 PM    PLATELET 024 20/40/5099 02:27 PM   @  Coagulation  Lab Results   Component Value Date    INR 1.0 08/09/2018    APTT 27.8 08/09/2018      Basic Metabolic Profile  Lab Results   Component Value Date     (L) 08/09/2018    CO2 21 08/09/2018    BUN 18 08/09/2018       Medications Reviewed      Assessment/Plan     Principal Problem:    Dislocation of internal right hip prosthesis ([address] (8/9/2018)    Active Problems:    HTN (hypertension) (1/11/2010)      Hearing loss (1/11/2010)      Vertigo (1/28/2011)      Overview: Currently under care Dr. Jace Moulton MRI brain normal.      Type 2 diabetes mellitus without complication, without long-term current use of insulin ([address] (11/7/2016)      Dislocated hip, right, initial encounter ([facility] [address] (8/9/2018)        Procedure(s):  RIGHT DISLOCATED ARTIFICIAL HIP/C-ARM/FRACTURE TABLE  :     1. PT and/or OT Consults: YES continue PT/OT: oob to chair daily, gentle rom                                                 2. Incentive spirometer - post op fever 101.3 - likely atelectasis, resolved at next vital check  3.  Pain control  4.     4. DISCHARGE PLANNING     Intervention : Navdeep Calvo ([facility])        [facility]  [phone]

## 2018-08-11 NOTE — ROUTINE PROCESS
Bedside and Verbal shift change report given to [address] (oncoming nurse) by Sandrine Espinoza RN (offgoing nurse). Report included the following information SBAR, Kardex, MAR and Recent Results.     SITUATION:    Code Status: Prior   Reason for Admission: DX   Dislocated hip, right, initial encounter [facility])    [facility] day: 2   Problem List:       Hospital Problems  Date Reviewed: 8/7/2018          Codes Class Noted POA    * (Principal)Dislocation of internal right hip prosthesis ([facility] [address] ICD-10-CM: T84.020A  ICD-9-CM: 996.42, V43.64  8/9/2018 Unknown        Dislocated hip, right, initial encounter [facility]) ICD-10-CM: S73.004A  ICD-9-CM: 835.00  8/9/2018 Unknown        Type 2 diabetes mellitus without complication, without long-term current use of insulin ([facility] [address] ICD-10-CM: E11.9  ICD-9-CM: 250.00  11/7/2016 Yes        Vertigo ICD-10-CM: R42  ICD-9-CM: 780.4  1/28/2011 Yes    Overview Signed 1/28/2011  9:23 AM by Jose Prater NP     Currently under care Dr. Roni Teran MRI brain normal.             HTN (hypertension) ICD-10-CM: I10  ICD-9-CM: 401.9  1/11/2010 Yes        Hearing loss ICD-10-CM: H91.90  ICD-9-CM: 389.9  1/11/2010 Yes              BACKGROUND:    Past Medical History:   Past Medical History:   Diagnosis Date    Allergies     Diabetes mellitus ([facility] [address] 1/11/2010    Hearing loss 1/11/2010    HTN (hypertension) 1/11/2010    Hypercholesteremia 1/11/2010    Hypothyroidism 1/11/2010    Macular degeneration     shanae Cordon     Left femural neck    Otosclerosis     Left ear    Panic attack     Thyroid disease     hypothyroid    Unspecified adverse effect of anesthesia     hard to wake up in the past. Had own blood transfusion and was better         Patient taking anticoagulants yes     ASSESSMENT:    Changes in Assessment Throughout Shift: NO     Patient has Central Line: no Reasons if yes: NO   Patient has Navarro Cath: no Reasons if yes: NO      Last Vitals:     Vitals: 08/11/18 0941 08/11/18 1117 08/11/18 1534 08/11/18 1642   BP: 96/61 90/48 131/74    Pulse: 88 65 93    Resp: 20 18 20    Temp: 97.9 °F (36.6 °C) 97.7 °F (36.5 °C) (!) 100.9 °F (38.3 °C) 98.7 °F (37.1 °C)   SpO2: 94% 94% 96%    Weight:       Height:            IV and DRAINS (will only show if present)   Peripheral IV 08/09/18 Left Arm-Site Assessment: Clean, dry, & intact     WOUND (if present)   Wound Type:  none   Dressing present Dressing Present : No   Wound Concerns/Notes:  none     PAIN    Pain Assessment    Pain Intensity 1: 0 (08/11/18 1544)    Pain Location 1: Incisional, Hip    Pain Intervention(s) 1: Medication (see MAR), Ice    Patient Stated Pain Goal: 0  o Interventions for Pain:  none, MEDS, REPOSITIONING  o Intervention effective: yes  o Time of last intervention: SEE MAR   o Reassessment Completed: yes      Last 3 Weights:  Last 3 Recorded Weights in this Encounter    08/09/18 1333   Weight: 70.3 kg (155 lb)     Weight change:      INTAKE/OUPUT    Current Shift: 08/11 0701 - 08/11 1900  In: 238 [P.O.:238]  Out: -     Last three shifts: 08/09 1901 - 08/11 0700  In: 3356.5 [P.O.:1539; I.V.:1817.5]  Out: 450 [Urine:450]     LAB RESULTS     Recent Labs      08/09/18   1427   WBC  9.7   HGB  12.9   HCT  35.8   PLT  317        Recent Labs      08/09/18   1427   NA  129*   K  3.8   GLU  216*   BUN  18   CREA  1.37*   CA  8.3*   INR  1.0       RECOMMENDATIONS AND DISCHARGE PLANNING     1. Pending tests/procedures/ Plan of Care or Other Needs: LABS     2. Discharge plan for patient and Needs/Barriers: SNF    3. Estimated Discharge Date: 8/13/18 Posted on Whiteboard in [place] Room: yes      4. The patient's care plan was reviewed with the oncoming nurse.        \"HEALS\" SAFETY CHECK      Fall Risk    Total Score: 5    Safety Measures: Safety Measures: Bed in low position, Bed/Chair-Wheels locked, Call light within reach, Gripper socks, Fall prevention (comment)    A safety check occurred in the patient's room between off going nurse and oncoming nurse listed above. The safety check included the below items  Area Items   H  High Alert Medications - Verify all high alert medication drips (heparin, PCA, etc.)   E  Equipment - Suction is set up for ALL patients (with fermin)  - Red plugs utilized for all equipment (IV pumps, etc.)  - WOWs wiped down at end of shift.  - Room stocked with oxygen, suction, and other unit-specific supplies   A  Alarms - Bed alarm is set for fall risk patients  - Ensure chair alarm is in place and activated if patient is up in a chair   L  Lines - Check IV for any infiltration  - Navarro bag is empty if patient has a Navarro   - Tubing and IV bags are labeled   S  Safety   - Room is clean, patient is clean, and equipment is clean. - Hallways are clear from equipment besides carts. - Fall bracelet on for fall risk patients  - Ensure room is clear and free of clutter  - Suction is set up for ALL patients (with fermin)  - Hallways are clear from equipment besides carts.    - Isolation precautions followed, supplies available outside room, sign posted     Janki Devlin RN

## 2018-08-11 NOTE — ROUTINE PROCESS
Patient is alert and oriented times three with periods of confusion. When getting her vitals she was growling. No sign sor symptoms of distress.  Eulalia Stapler is in place and pulses palpable

## 2018-08-12 NOTE — ROUTINE PROCESS
Patient is alert and oriented times one to two no signs or symptoms of distress. Pulses palpable leg brace in place.

## 2018-08-12 NOTE — PROGRESS NOTES
[facility]    Progress Note      Patient: Rosamaria Cao               Sex: female          DOA: 8/9/2018         YOB: 1935      Age:  [de-identified] y.o.        LOS:  LOS: 3 days         S/P  Procedure(s):  RIGHT DISLOCATED ARTIFICIAL HIP/C-ARM/FRACTURE TABLE                Rapid Response called to room. Attempted cardioversion.  Patient being transferred to ICU    ICU and cardiology consulted by rapid response team  Hospitalist consulted by myself for further medical care    [facility]  [phone]

## 2018-08-12 NOTE — PROGRESS NOTES
Occupational Therapy Note:  Patient transferred to the ICU secondary to decline in medical status since evaluation on 8/11/2018. OT order will be discontinued at this time. Please re-order skilled OT when appropriate. Thank you.   Sun Contreras MS OTR/L

## 2018-08-12 NOTE — PROGRESS NOTES
D/W daughters bedside regarding goals of care. They have paperwork with POA, and have D/W patient prior to this admission about not wanting to have breathing tubes or CPR. They will have discussions with themselves about weather or not the want to pursue further aggressive measures regarding her care or not. Patient currently stable on 1 pressor.  Will cont full medical support without CPR/Intubation

## 2018-08-12 NOTE — CONSULTS
[facility] - Consult Note    Consultation request by Caio Ford MD for advice/opinion related to evaluating DX  Dislocated hip, right, initial encounter [facility])    Date of  Admission: 8/9/2018  1:09 PM   Primary Care Physician:  Eusebia Saldaña NP     Assessment:     Patient Active Problem List   Diagnosis Code    HTN (hypertension) I10    Hypothyroidism E03.9    Hearing loss H91.90    Hypercholesteremia E78.00    Vertigo R42    RLS (restless legs syndrome) G25.81    Left hip pain M25.552    Osteoarthrosis, unspecified whether generalized or localized, lower leg M17.10    Essential hypertension I10    Type 2 diabetes mellitus without complication, without long-term current use of insulin ([address] E11.9    Atrophic vaginitis N95.2    Type 2 diabetes mellitus with diabetic neuropathy ([address] E11.40    Hyponatremia E87.1    Hypokalemia E87.6    Pulmonary nodule R91.1    Type 2 diabetes with nephropathy (HCC) E11.21    Gross hematuria R31.0    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 Required multiple synchronized shocks that did not greatly improve her rates or pressures. -Recent closed reduction of R hip fracture.  She has had a low grade fever post-op and may be septic  -Nuclear stress test in 11/2015 that was normal  -History of HTN  -History of DM  -History of dyslipidemia    Atrial Fibrillation CHADSVASC2 Score Stroke Risk:   [de-identified] y.o. > 76        +2    female Female +1   CHF HX: No    + 0   HTN HX: Yes    +1   Stroke/TIA/Thromboembolism No    +0   Vascular Disease HX: No    + 0   Diabetes Mellitus Yes    +1   CHADSVASC 2 Score 5      Annual Stroke Risk 7.2% - moderate-high -No primary cardiologist     Plan:     Stabilizing on robert and have started on amiodarone drip  Transfer to ICU and stat echo to evaluate RV and determine if any wall motion abnormalities to suggest possible PE. Would continue with current management, IVF's and monitor rates. More recommendations pending echo results  Will follow. History of Present Illness: This is a [de-identified] y.o. female admitted for DX  Dislocated hip, right, initial encounter ([facility] [address] Patient complains of:  Asked to see patient emergently for an RRT with patient in AF RVR and hypotension. Patient was admitted after a fall and dislocation of R hip and knee injury. She is s/p closed reduction procedure 2 days ago. The patient denied any chest pain to the resident initially but stated to me that she has chest pain now, this after a couple of synchronized shocks. Cardiac risk factors: dyslipidemia, diabetes mellitus, sedentary life style, hypertension, post-menopausal, age      Review of Symptoms:  Except as stated above include:  Constitutional:  negative  Respiratory:  negative  Cardiovascular:  negative  Gastrointestinal: negative  Genitourinary:  negative  Musculoskeletal:  Negative  Neurological:  Negative  Dermatological:  Negative  Endocrinological: Negative  Psychological:  Negative    Review of systems not obtained due to patient factors.      Past Medical History:     Past Medical History:   Diagnosis Date    Allergies     Diabetes mellitus ([facility] [address] 1/11/2010    Hearing loss 1/11/2010    HTN (hypertension) 1/11/2010    Hypercholesteremia 1/11/2010    Hypothyroidism 1/11/2010    Macular degeneration     shanae Mitchell     Left femural neck    Otosclerosis     Left ear    Panic attack     Thyroid disease     hypothyroid    Unspecified adverse effect of anesthesia     hard to wake up in the past. Had own blood transfusion and was better         Social History:     Social History     Social History    Marital status:      Spouse name: N/A    Number of children: N/A    Years of education: N/A     Social History Main Topics    Smoking status: Former Smoker     Packs/day: 1.00     Years: 20.00     Types: Cigarettes     Quit date: 8/27/1995    Smokeless tobacco: Never Used    Alcohol use No    Drug use: No    Sexual activity: No     Other Topics Concern    None     Social History Narrative        Family History:     Family History   Problem Relation Age of Onset    Breast Cancer Sister 70    Heart Attack Father     Heart Attack Daughter 52     March 2018        Medications:      Allergies   Allergen Reactions    Phenergan [Promethazine] Other (comments)     Made patient very hyper and extremely agitated          Current Facility-Administered Medications   Medication Dose Route Frequency    midazolam (VERSED) 1 mg/mL injection        LORazepam (ATIVAN) 2 mg/mL injection        insulin lispro (HUMALOG) injection   SubCUTAneous AC&HS    glucose chewable tablet 16 g  4 Tab Oral PRN    glucagon (GLUCAGEN) injection 1 mg  1 mg IntraMUSCular PRN    dextrose (D50W) injection syrg 12.5-25 g  25-50 mL IntraVENous PRN    apixaban (ELIQUIS) tablet 2.5 mg  2.5 mg Oral BID    morphine injection 2 mg  2 mg IntraVENous Q2H PRN    levothyroxine (SYNTHROID) tablet 100 mcg  100 mcg Oral 6am    gabapentin (NEURONTIN) capsule 900 mg  900 mg Oral QHS    losartan (COZAAR) tablet 50 mg  50 mg Oral DAILY    hydroCHLOROthiazide (HYDRODIURIL) tablet 25 mg  25 mg Oral DAILY    conjugated estrogens (PREMARIN) 0.625 mg/gram vaginal cream 0.5 g  0.5 g Vaginal DAILY    sucralfate (CARAFATE) tablet 1 g  1 g Oral QID    LORazepam (ATIVAN) tablet 0.5 mg  0.5 mg Oral Q8H PRN    escitalopram oxalate (LEXAPRO) tablet 20 mg  20 mg Oral DAILY    sodium chloride (NS) flush 5-10 mL  5-10 mL IntraVENous Q8H    sodium chloride (NS) flush 5-10 mL  5-10 mL IntraVENous PRN    acetaminophen (TYLENOL) tablet 650 mg 650 mg Oral Q6H    oxyCODONE IR (ROXICODONE) tablet 5 mg  5 mg Oral Q4H PRN    docusate sodium (COLACE) capsule 100 mg  100 mg Oral BID         Physical Exam:     Visit Vitals    BP (!) 80/54 (BP 1 Location: Left arm)    Pulse (!) 147    Temp 100 °F (37.8 °C)    Resp 15    Ht 5' 4\" (1.626 m)    Wt 70.3 kg (155 lb)    SpO2 98%    BMI 26.61 kg/m2     BP Readings from Last 3 Encounters:   08/12/18 (!) 80/54   08/07/18 128/61   07/25/18 128/70     Pulse Readings from Last 3 Encounters:   08/12/18 (!) 147   08/07/18 72   07/23/18 (!) 54     Wt Readings from Last 3 Encounters:   08/09/18 70.3 kg (155 lb)   08/07/18 70.3 kg (155 lb)   07/25/18 72.6 kg (160 lb)       General:  cooperative, mild distress, appears stated age, flushed  Neck:  nontender, no JVD  Lungs:  clear to auscultation bilaterally  Heart:  irregularly irregular rhythm  Abdomen:  abdomen is soft without significant tenderness, masses, organomegaly or guarding  Extremities:  extremities normal, atraumatic, no cyanosis or edema  Skin: Warm and dry.  no hyperpigmentation, vitiligo, or suspicious lesions  Neuro: unable to assess given clinical scenario  Psych: unable to assess given clinical scenario     Data Review:     Recent Labs      08/09/18   1427   WBC  9.7   HGB  12.9   HCT  35.8   PLT  317     Recent Labs      08/09/18   1427   NA  129*   K  3.8   CL  95*   CO2  21   GLU  216*   BUN  18   CREA  1.37*   CA  8.3*   INR  1.0       Results for orders placed or performed during the hospital encounter of 07/16/18   EKG, 12 LEAD, INITIAL   Result Value Ref Range    Ventricular Rate 79 BPM    Atrial Rate 79 BPM    P-R Interval 168 ms    QRS Duration 66 ms    Q-T Interval 412 ms    QTC Calculation (Bezet) 472 ms    Calculated P Axis 35 degrees    Calculated R Axis -42 degrees    Calculated T Axis -25 degrees    Diagnosis       Sinus rhythm with premature atrial complexes  Left axis deviation  Minimal voltage criteria for LVH, may be normal variant  Abnormal ECG  When compared with ECG of 2018 07:50,  premature atrial complexes are now present    Inverted T waves have replaced nonspecific T wave abnormality in Inferior   leads  Confirmed by Gege Horowitz ([phone]) on 2018 5:48:40 PM     Results for orders placed or performed during the hospital encounter of 16   ECG HOLTER MONITOR, UP TO [facility]                    [address] [facility], [address]                                         Test Date:    2016-11-15  [facility] Name:     Elizabeth Torres             Department:     Patient ID:   [id number]                Room:           Gender:       Female                   Technician:     :          51-               Requested By: Dr. Samantha Boyer Number:                          Reading MD:   Chiqui Nova MD                    Interpretive Statements  Dorcas Brambila was in Sinus Rhythm. The average heart rate, excluding ectopy, was 62 BPM with a minimum of 49 BPM   at  02:45D2 and a maximum of 101 BPM at   13:56D1. Heart beats, including ectopy, totaled 29826 beats. VENTRICULAR ECTOPICS totaled 5  averaging  0.2 per hour  with 5 single, 0   paired, 0 trigeminy and 0 R on T.    SUPRAVENTRICULAR ECTOPICS totaled 247  averaging  10.8 per hour  ,with 181   single and 22 paired beats. SUPRAVENTRICULAR TACHYCARDIA occurred 3 times. The fastest run was at 154 BPM and occurred at 05:21D2 with 10 beats. The longest run was 27 beats at 00:04D2 at a rate of 129 BPM.    Interpretation:    1. Rhythm was sinus. 2. NJ and QRS are within normal limits. 3. (5) single VEs.  4. (181) single SVEs, (22) paired, (3) runs of svt. 5. Patient diary events were noted.                         Signed:____________________________________    Electronically signed by Chiqui Nova MD on 2016  6:30AM CDT       All Cardiac Markers in the last 24 hours:  No results found for: CPK, CK, CKMMB, CKMB, RCK3, CKMBT, CKNDX, CKND1, JEANE, TROPT, TROIQ, ROSY, TROPT, TNIPOC, BNP, BNPP    Last Lipid:    Lab Results   Component Value Date/Time    Cholesterol, total 201 (H) 12/18/2017 11:57 AM    HDL Cholesterol 62 (H) 12/18/2017 11:57 AM    LDL, calculated 102.2 (H) 12/18/2017 11:57 AM    Triglyceride 184 (H) 12/18/2017 11:57 AM    CHOL/HDL Ratio 3.2 12/18/2017 11:57 AM       Signed By: REINIER Dorman     August 12, 2018

## 2018-08-12 NOTE — ROUTINE PROCESS
Mobility Intervention:       [] Pt dangled at edge of bed    [x] Pt assisted OOB to bedside commode    [] Pt assisted OOB to chair    [] Pt ambulated to bathroom    [] Patient was ambulated in room/hallway    Assistive Device Utilized:       [x] Rolling walker   [] Crutches   [] Straight Cane   [] Knee immobilizer   [] IV pole    After Mobilization:     [] Patient left in no apparent distress sitting up in chair  [x] Patient left in no apparent distress in bed  [x] Call bell left within reach  [x] SCDs on & machine turned on  [] Ice applied  [] RN notified  [] Caregiver present  [] Bed alarm activated    Reason patient not mobilized:      [] Patient refused   [] Nausea/vomiting   [] Low blood pressure   [] Drowsy/lethargic    Pain Rating:     [x] 0  [] 1  Assistive Device:        [] 2  [] 3  [] 4  [] 5  [] 6  Assistive Device:        [] 7  [] 8  [] 9  [] 10    Comments:

## 2018-08-12 NOTE — PROGRESS NOTES
PT order discontinued as patient has had a change in medical status and is now in ICU. Please re-order PT when appropriate.   Karen Angeles, PT

## 2018-08-12 NOTE — PROGRESS NOTES
Rapid Response Note  [facility]    Patient: Rosamaria Cao [de-identified] y.o. female  [id number]  1935      Admit Date: 8/9/2018   Admission Diagnosis: DX  Dislocated hip, right, initial encounter ([facility] [address]    RAPID RESPONSE     Rapid response called for hypotension. Upon arrival, patient was found to be in atrial fibrillation with RVR with rate in the 160s. Her BP was found to be in the 70s/30s. At this point, a normal saline bolus was started and room preared for synchronized cardioversion. Attempts to cardiovert at 100Jx1, 150Jx1 and 200Jx2 were not successful in establishing NSR or improving blood pressure. At this time, patient was started on pressors and cardiology PA was present at bedside. Following initiation of neosynephrine, patient's BP improved to 105/77 with rate still in the 130s. I discussed the case with Dr. Rm Celaya, cardiology and Dr. Bonilla [place] ICU. It was recommended that the patient be taken to CT for CTA due to high concern for PE, then brought to the ICU after CT. Medications Reviewed    OBJECTIVE     Visit Vitals    BP (!) 80/54 (BP 1 Location: Left arm)  Comment: nurse knows    Pulse (!) 147    Temp 100 °F (37.8 °C)    Resp 15    Ht 5' 4\" (1.626 m)    Wt 70.3 kg (155 lb)    SpO2 98%    BMI 26.61 kg/m2       PHYSICAL:  General:  Alert and answering questions appropriately prior to administration of versed for cardioversion. CV:  Tachycardic, irregularly irregular rhythm. PMI not displaced. No visible pulsations or thrills. No carotid bruits. RESP:  Unlabored breathing. Lungs clear to auscultation. no wheeze, rales, or rhonchi. Equal expansion bilaterally. ABD:  Soft, nontender, nondistended. Normoactive bowel sounds. No hepatosplenomegaly. No suprapubic tenderness. No CVA tenderness. Neuro:  5/5 strength bilateral upper extremities and lower extremities. CN II-XII intact. Sensation grossly intact. A+Ox3.     Medications administered: versed 1mg, amiodarone 150mg, neosynephrine, 1 liter NS bolus    EKG: A fib with RVR    Labs: CBC, CMP lactic acid ordered. ASSESSMENT, PLAN & DISPOSITION   Bess Olivas is a [de-identified]y.o. year old female admitted for DX  Dislocated hip, right, initial encounter ([facility] [address] Rapid response called for hypotension. Patient condition currently: requiring ICU level of care. See above for RRT actions taken. Disposition: transfer to ICU    Attending Dr. Lucas Menendez, Dr. Tom Bartlett, Dr. Tennille Thompson physician assistant notified of rapid response. In agreement with plan. Primary team resuming care.   Dr. Tennille Thompson PA will consult hospitalist.       Kay Hernandez MD  8:40 AM 08/12/18

## 2018-08-12 NOTE — PROCEDURES
Markell Lizarraga Pulmonary Specialist  Central Line Procedure Note    Indication: Need for vasopressors    Risks, benefits, alternatives explained and consent obtained. Patient positioned in Trendelenburg. Central line Bundle:   Full sterile barrier precautions used. 7-Step Sterility Protocol followed. (cap, mask sterile gown, sterile gloves, large sterile sheet, hand hygiene, 2% chlorhexidine for cutaneous antisepsis)  5 mL 1% Lidocaine placed at insertion site. Using ultrasound guidance,   Right internal jugular cannulated x 1 attempt(s) utilizing the modified Seldinger technique. no  Good blood return. Catheter secured & Biopatch applied. Sterile Tegaderm placed. CXR pending.       Javan Hahn PA-C  10:14 AM

## 2018-08-12 NOTE — ROUTINE PROCESS
Bedside and Verbal shift change report given to ubaldo RN (oncoming nurse) by Ruben Valente RN (offgoing nurse). Report included the following information SBAR, Kardex, MAR and Recent Results.     SITUATION:    Code Status: Prior  Reason for Admission: DX   Dislocated hip, right, initial encounter [facility])    [facility] day: 3   Problem List:       Hospital Problems  Date Reviewed: 8/7/2018          Codes Class Noted POA    * (Principal)Dislocation of internal right hip prosthesis ([facility] [address] ICD-10-CM: T84.020A  ICD-9-CM: 996.42, V43.64  8/9/2018 Unknown        Dislocated hip, right, initial encounter [facility]) ICD-10-CM: S73.004A  ICD-9-CM: 835.00  8/9/2018 Unknown        Type 2 diabetes mellitus without complication, without long-term current use of insulin ([facility] [address] ICD-10-CM: E11.9  ICD-9-CM: 250.00  11/7/2016 Yes        Vertigo ICD-10-CM: R42  ICD-9-CM: 780.4  1/28/2011 Yes    Overview Signed 1/28/2011  9:23 AM by Shey Franklin NP     Currently under care Dr. Oumar Mehta MRI brain normal.             HTN (hypertension) ICD-10-CM: I10  ICD-9-CM: 401.9  1/11/2010 Yes        Hearing loss ICD-10-CM: H91.90  ICD-9-CM: 389.9  1/11/2010 Yes              BACKGROUND:    Past Medical History:   Past Medical History:   Diagnosis Date    Allergies     Diabetes mellitus ([facility] [address] 1/11/2010    Hearing loss 1/11/2010    HTN (hypertension) 1/11/2010    Hypercholesteremia 1/11/2010    Hypothyroidism 1/11/2010    Macular degeneration     [facility]     Left femural neck    Otosclerosis     Left ear    Panic attack     Thyroid disease     hypothyroid    Unspecified adverse effect of anesthesia     hard to wake up in the past. Had own blood transfusion and was better         Patient taking anticoagulants yes     ASSESSMENT:    Changes in Assessment Throughout Shift: NO     Patient has Central Line: no Reasons if yes: NO   Patient has Navarro Cath: no Reasons if yes: NO      Last Vitals:     Vitals: 08/11/18 1117 08/11/18 1534 08/11/18 1642 08/11/18 1930   BP: 90/48 131/74  97/58   Pulse: 65 93  79   Resp: 18 20 17   Temp: 97.7 °F (36.5 °C) (!) 100.9 °F (38.3 °C) 98.7 °F (37.1 °C) 98 °F (36.7 °C)   SpO2: 94% 96%  96%   Weight:       Height:            IV and DRAINS (will only show if present)   Peripheral IV 08/09/18 Left Arm-Site Assessment: Clean, dry, & intact     WOUND (if present)   Wound Type:  none   Dressing present Dressing Present : No   Wound Concerns/Notes:  none     PAIN    Pain Assessment    Pain Intensity 1: 0 (08/12/18 0000)    Pain Location 1: Incisional, Hip    Pain Intervention(s) 1: Medication (see MAR), Ice    Patient Stated Pain Goal: 0  o Interventions for Pain:  none, MEDS, REPOSITIONING  o Intervention effective: yes  o Time of last intervention: SEE MAR   o Reassessment Completed: yes      Last 3 Weights:  Last 3 Recorded Weights in this Encounter    08/09/18 1333   Weight: 70.3 kg (155 lb)     Weight change:      INTAKE/OUPUT    Current Shift: 08/11 1901 - 08/12 0700  In: 120 [P.O.:120]  Out: -     Last three shifts: 08/10 0701 - 08/11 1900  In: 2778.7 [P.O.:1657; I.V.:1121.7]  Out: 350 [Urine:350]     LAB RESULTS     Recent Labs      08/09/18   1427   WBC  9.7   HGB  12.9   HCT  35.8   PLT  317        Recent Labs      08/09/18   1427   NA  129*   K  3.8   GLU  216*   BUN  18   CREA  1.37*   CA  8.3*   INR  1.0       RECOMMENDATIONS AND DISCHARGE PLANNING     1. Pending tests/procedures/ Plan of Care or Other Needs: LABS     2. Discharge plan for patient and Needs/Barriers: SNF    3. Estimated Discharge Date: 8/13/18 Posted on Whiteboard in [facility] Room: yes      4. The patient's care plan was reviewed with the oncoming nurse.        \"HEALS\" SAFETY CHECK      Fall Risk    Total Score: 5    Safety Measures: Safety Measures: Bed/Chair-Wheels locked, Bed in low position, Caregiver at bedside, Call light within reach, Gripper socks, Side rails X 3    A safety check occurred in the patient's room between off going nurse and oncoming nurse listed above. The safety check included the below items  Area Items   H  High Alert Medications - Verify all high alert medication drips (heparin, PCA, etc.)   E  Equipment - Suction is set up for ALL patients (with fermin)  - Red plugs utilized for all equipment (IV pumps, etc.)  - WOWs wiped down at end of shift.  - Room stocked with oxygen, suction, and other unit-specific supplies   A  Alarms - Bed alarm is set for fall risk patients  - Ensure chair alarm is in place and activated if patient is up in a chair   L  Lines - Check IV for any infiltration  - Navarro bag is empty if patient has a Navarro   - Tubing and IV bags are labeled   S  Safety   - Room is clean, patient is clean, and equipment is clean. - Hallways are clear from equipment besides carts. - Fall bracelet on for fall risk patients  - Ensure room is clear and free of clutter  - Suction is set up for ALL patients (with fermin)  - Hallways are clear from equipment besides carts.    - Isolation precautions followed, supplies available outside room, sign posted     Lea Whitehead RN

## 2018-08-12 NOTE — ROUTINE PROCESS
Received report on patient from [address] [facility] [address] at 6233. At [address] [facility], SHANI Veloz call nurse to the room to discussed patient's hypotension and  poor oxygenation. Both incoming and ongoing nurse enter the room. Patient was found alert and responsive. Patient was asked if she is ok. Patient replied no. BP was check in both hands twice on each hand reading suggest severe hypotension. 2L oxygen was applied to improve oxygenation. Nurse ubaldo RN called RRT at [phone]. RRT team arrived on unit at 0730. The team includes, two ICU nurses, Francisca Green and Dionna Vazquez RN, respiratory therapist Sherry Hebert, nursing supervisor Joselyn Niño and Dr. Aly Garner with two nurses Dejan Abel and and [place] from [facility]. EKG and 1 litter bolus NS was ordered. 1 mg versed was given to patient by RN Courtney Campuzano. Four attempt was made to convert patient A-Fib with RVR to normal sinus rhythm. All attempts fail. Patient was still experiencing critical hypotension. Amiodarone 150 mg in 100 ml  was administer by Francisca Green and Debora Body was also administered. Patient BP improve from lower 03F and 22H systolic  to 437/19 for a brief moment. Decision was made to transfer patient to ICU. Echo was ordered. Patient was move off the unit at 0900 escorted by ICU nurses padilla and Cecile MONTENEGRO.

## 2018-08-12 NOTE — ROUTINE PROCESS
Patient is confused and calling for help no signs or symptoms of distress.  Earnie Jc is in place and pulses palpable

## 2018-08-12 NOTE — PROGRESS NOTES
TRANSFER - OUT REPORT:    Verbal report given to THE [facility] RN(name) on Dmitri Gaming  being transferred to ICU room 308(unit) for change in patient condition(hypotention)       Report consisted of patients Situation, Background, Assessment and   Recommendations(SBAR). Information from the following report(s) SBAR, Kardex, Procedure Summary, Intake/Output, MAR, Recent Results and Cardiac Rhythm a-fib was reviewed with the receiving nurse. Lines:   Peripheral IV 08/09/18 Left Arm (Active)   Site Assessment Clean, dry, & intact 8/12/2018  4:33 AM   Phlebitis Assessment 0 8/12/2018  4:33 AM   Infiltration Assessment 0 8/12/2018  4:33 AM   Dressing Status Clean, dry, & intact 8/12/2018  4:33 AM   Dressing Type Transparent 8/12/2018  4:33 AM   Hub Color/Line Status Pink 8/12/2018  4:33 AM        Opportunity for questions and clarification was provided.       Patient transported with:   O2 @ 2 liters  Registered Nurse

## 2018-08-12 NOTE — CONSULTS
[facility]  Pulmonary, Critical Care, and Sleep Medicine    Name: Yuridia Chavez MRN: [id number]   : 1935 Hospital: [address] [place] [address] Dr   Date: 2018        Critical Care Initial Patient Consult      IMPRESSION:   - New afib with RVR symptomatic refractory to cardioversion and but converted to sinus following amiodarone and pressors.    - Likely PE - started heparin drip empirically while waiting further evaluation.  - Acute renal insufficiency  - POD 2 right hip replacment  - History of hypothyroidism  - HTN       Patient Active Problem List   Diagnosis Code    HTN (hypertension) I10    Hypothyroidism E03.9    Hearing loss H91.90    Hypercholesteremia E78.00    Vertigo R42    RLS (restless legs syndrome) G25.81    Left hip pain M25.552    Osteoarthrosis, unspecified whether generalized or localized, lower leg M17.10    Essential hypertension I10    Type 2 diabetes mellitus without complication, without long-term current use of insulin ([facility]) E11.9    Atrophic vaginitis N95.2    Type 2 diabetes mellitus with diabetic neuropathy ([facility]) E11.40    Hyponatremia E87.1    Hypokalemia E87.6    Pulmonary nodule R91.1    Type 2 diabetes with nephropathy ([facility]) E11.21    Gross hematuria R31.0    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 Complaining of diffuse pain and anxiety. Plan on cautious use of low dose benzos given hemodynamics. · Resp: Adequate oxygenation and ventilation at this time. · CV: Afib with RVR with resultant hypotension now resolved s/p conversion to sinus rhythm. Will continue to monitor closely and maintain MAPs > 65 and continue amiodarone drip.   Plan on V/Q scan for further evaluation of possible PE given renal function and continue heparin drip in the meantime. · GI: NPO for now  · Renal: Hold on contrasted study. Fluids; renally dose meds; avoid nephrotoxins. · Heme: no active issues  · ID: low grade fevers likely due to recent surgery. Will obtain cultures to rule out infection. No indication for antibiotics at this time. · Endo: continue synthroid. · Prophylaxis: on heparin drip   · Further recommendations based on ongoing response to therapy. Subjective/History: This patient has been seen and evaluated at the request of Dr. Lucero Lozada for new atrial fibrillation with RVR. Patient is an [de-identified] yo woman with prior history of bilateral hip replacements who presented 2 days ago with traumatic displacement of right hip prosthetic now POD 2 from replacement who was noted to be hypotense this am resulting in RRT and patient found to be in atrial fibrillation with RVR and hypotension. Cardioversion x 3 failed and patient transferred to ICU for further management. Noted to have infiltration of peripheral line where amiodarone and pressors were being infused so central line placed and patient converted to sinus rhythm shortly after placement of central line. Heparin drip also initiated due to high liklihood of acute PE as precipitating factor for new afib this am.      The patient is critically ill and can not provide additional history due to ongoing medical evaluation.     Past Medical History:   Diagnosis Date    Allergies     Diabetes mellitus ([facility] [address] 1/11/2010    Hearing loss 1/11/2010    HTN (hypertension) 1/11/2010    Hypercholesteremia 1/11/2010    Hypothyroidism 1/11/2010    Macular degeneration     shanae Penn     Left femural neck    Otosclerosis     Left ear    Panic attack     Thyroid disease     hypothyroid    Unspecified adverse effect of anesthesia     hard to wake up in the past. Had own blood transfusion and was better      Past Surgical History:   Procedure Laterality Date    HX HEENT      Left stapedectomy 10/03    HX HEENT  7/2012    eye lids lifted Dr. Carin Dawson HX MALIGNANT SKIN LESION EXCISION  07/26/2013    Upper arm basal cell skin cancer removal    HX ORTHOPAEDIC      Right hip replacement 2004, left 2015      Prior to Admission medications    Medication Sig Start Date End Date Taking? Authorizing Provider   naproxen (NAPROSYN) 500 mg tablet Take 500 mg by mouth nightly. Yes Historical Provider   LORazepam (ATIVAN) 0.5 mg tablet Take 1 Tab by mouth every eight (8) hours as needed for Anxiety. Max Daily Amount: 1.5 mg. 8/7/18   Collins Cottrell NP   escitalopram oxalate (LEXAPRO) 20 mg tablet Take 1 Tab by mouth daily. 8/7/18   Collins Cottrell NP   sucralfate (CARAFATE) 1 gram tablet Take 1 Tab by mouth four (4) times daily. 7/5/18   Genaro Singleton MD   glipiZIDE (GLUCOTROL) 10 mg tablet Take 1 Tab by mouth two (2) times a day. 6/14/18   Kim Ocampo NP   PREMARIN 0.625 mg/gram vaginal cream PLEASE APPLY A PEA SIZED AMOUNT VAGINALLY AT BEDTIME FOR 30 DAYS, THEN TWICE WEEKLY 5/31/18   Historical Provider   OTHER PLEASE CHECK URINE CULTURE AND SENSITIVITY - PENDING AT TIME OF DISCHARGE AT [facility] 6/10/18   Jamie Carreon MD   OTHER To PCP  - Check Urine culture results from [facility] - and change antibiotics as appropriate   - Patient has new Lung nodule - s/b [facility] pulmonary - SHE NEEDS FOLLOW UP WITH THEM IN 1 -2 WEEKS 6/10/18   Jamie Carreon MD   losartan (COZAAR) 50 mg tablet TAKE 1 TABLET DAILY 4/19/18   Collins Cottrell NP   hydroCHLOROthiazide (HYDRODIURIL) 25 mg tablet TAKE 1 TABLET DAILY 4/19/18   Collins Cottrell NP   metFORMIN (GLUCOPHAGE) 500 mg tablet Take 500 mg by mouth two (2) times daily (with meals). Historical Provider   CRAN/VITC/MANNOSE/FOS/BROMELN (CYSTEX CRANBERRY PO) Take 1 Cap by mouth daily as needed.     Historical Provider   gabapentin (NEURONTIN) 300 mg capsule Take 3 Caps by mouth nightly.  Indications: Restless Legs Syndrome 12/18/17   Marigene Karlos, NP   levothyroxine (SYNTHROID) 100 mcg tablet TAKE 1 TABLET DAILY BEFORE BREAKFAST 12/7/17   Adela Karlos, NP     Current Facility-Administered Medications   Medication Dose Route Frequency    midazolam (VERSED) 1 mg/mL injection        LORazepam (ATIVAN) 2 mg/mL injection        vasopressin (VASOSTRICT) 100 Units in 0.9% sodium chloride 100 mL infusion  0-0.04 Units/min IntraVENous TITRATE    amiodarone (CORDARONE) 150 mg in dextrose 5% 100 ml bolus premix 150 mg  150 mg IntraVENous ONCE    amiodarone (NEXTERONE) 360 mg in dextrose 200 mL (1.8 mg/mL) infusion  1 mg/min IntraVENous CONTINUOUS    PHENYLephrine (KE-SYNEPHRINE) 90 mg in 0.9% sodium chloride 250 mL infusion (premix or compounded)   mcg/min IntraVENous TITRATE    heparin 25,000 units in D5W 250 ml infusion  18-36 Units/kg/hr IntraVENous TITRATE    insulin lispro (HUMALOG) injection   SubCUTAneous AC&HS    levothyroxine (SYNTHROID) tablet 100 mcg  100 mcg Oral 6am    gabapentin (NEURONTIN) capsule 900 mg  900 mg Oral QHS    losartan (COZAAR) tablet 50 mg  50 mg Oral DAILY    hydroCHLOROthiazide (HYDRODIURIL) tablet 25 mg  25 mg Oral DAILY    conjugated estrogens (PREMARIN) 0.625 mg/gram vaginal cream 0.5 g  0.5 g Vaginal DAILY    sucralfate (CARAFATE) tablet 1 g  1 g Oral QID    escitalopram oxalate (LEXAPRO) tablet 20 mg  20 mg Oral DAILY    sodium chloride (NS) flush 5-10 mL  5-10 mL IntraVENous Q8H    acetaminophen (TYLENOL) tablet 650 mg  650 mg Oral Q6H    docusate sodium (COLACE) capsule 100 mg  100 mg Oral BID     Allergies   Allergen Reactions    Phenergan [Promethazine] Other (comments)     Made patient very hyper and extremely agitated        Social History   Substance Use Topics    Smoking status: Former Smoker     Packs/day: 1.00     Years: 20.00     Types: Cigarettes     Quit date: 8/27/1995    Smokeless tobacco: Never Used    Alcohol use No      Family History   Problem Relation Age of Onset    Breast Cancer Sister 70    Heart Attack Father     Heart Attack Daughter 52     2018        Review of Systems:  Review of systems not obtained due to patient factors. Objective:   Vital Signs:    Visit Vitals    BP (!) 80/54    Pulse (!) 147    Temp 100 °F (37.8 °C)    Resp 15    Ht 5' 4\" (1.626 m)    Wt 70.3 kg (155 lb)    SpO2 98%    BMI 26.61 kg/m2       O2 Device: Nasal cannula   O2 Flow Rate (L/min): 2 l/min   Temp (24hrs), Av.5 °F (37.5 °C), Min:98 °F (36.7 °C), Max:100.9 °F (38.3 °C)       Intake/Output:   Last shift:         Last 3 shifts: 08/10 1901 -  0700  In: 3138.7 [P.O.:; I.V.:1121.7]  Out: -     Intake/Output Summary (Last 24 hours) at 18 1128  Last data filed at 18 0654   Gross per 24 hour   Intake              480 ml   Output                0 ml   Net              480 ml       Physical Exam:     General:  Patient in mild distress but alert and follows commands   Head:  Normocephalic, without obvious abnormality, atraumatic. Eyes:  Conjunctivae/corneas clear. PERRL,   Nose: Nares normal. Septum midline. Mucosa normal. No drainage or sinus tenderness. Throat: Lips, mucosa, and tongue normal. Teeth and gums normal.   Neck: Supple, symmetrical, trachea midline, no adenopathy, thyroid: no enlargment/tenderness/nodules, no carotid bruit and no JVD. Back:   Symmetric, no curvature. ROM normal.   Lungs:   Clear to auscultation bilaterally. No wheezes or rhonchi. Chest wall:  No tenderness or deformity. Heart:  Irregular rate and tachycardic. No rubs or gallops   Abdomen:   Soft, non-tender. Bowel sounds normal. No masses,  No organomegaly. Extremities: Noted extravasation of IV at left antecubital site. Pulses: 2+ and symmetric all extremities.    Skin: Skin color, texture, turgor normal. No rashes or lesions   Lymph nodes:      Cervical, supraclavicular, and axillary nodes normal. Neurologic: Grossly nonfocal       Data:     Recent Results (from the past 24 hour(s))   GLUCOSE, POC    Collection Time: 08/11/18 12:02 PM   Result Value Ref Range    Glucose (POC) 162 (H) 70 - 110 mg/dL   GLUCOSE, POC    Collection Time: 08/11/18  6:54 PM   Result Value Ref Range    Glucose (POC) 121 (H) 70 - 110 mg/dL   GLUCOSE, POC    Collection Time: 08/11/18  9:12 PM   Result Value Ref Range    Glucose (POC) 111 (H) 70 - 110 mg/dL   GLUCOSE, POC    Collection Time: 08/12/18  5:43 AM   Result Value Ref Range    Glucose (POC) 68 (L) 70 - 110 mg/dL   GLUCOSE, POC    Collection Time: 08/12/18  6:01 AM   Result Value Ref Range    Glucose (POC) 75 70 - 110 mg/dL   GLUCOSE, POC    Collection Time: 08/12/18  6:17 AM   Result Value Ref Range    Glucose (POC) 111 (H) 70 - 110 mg/dL   GLUCOSE, POC    Collection Time: 08/12/18  7:28 AM   Result Value Ref Range    Glucose (POC) 112 (H) 70 - 654 mg/dL   METABOLIC PANEL, COMPREHENSIVE    Collection Time: 08/12/18  7:41 AM   Result Value Ref Range    Sodium 128 (L) 136 - 145 mmol/L    Potassium 3.9 3.5 - 5.5 mmol/L    Chloride 94 (L) 100 - 108 mmol/L    CO2 21 21 - 32 mmol/L    Anion gap 13 3.0 - 18 mmol/L    Glucose 102 (H) 74 - 99 mg/dL    BUN 50 (H) 7.0 - 18 MG/DL    Creatinine 2.76 (H) 0.6 - 1.3 MG/DL    BUN/Creatinine ratio 18 12 - 20      GFR est AA 20 (L) >60 ml/min/1.73m2    GFR est non-AA 17 (L) >60 ml/min/1.73m2    Calcium 8.0 (L) 8.5 - 10.1 MG/DL    Bilirubin, total 0.5 0.2 - 1.0 MG/DL    ALT (SGPT) 11 (L) 13 - 56 U/L    AST (SGOT) 15 15 - 37 U/L    Alk.  phosphatase 69 45 - 117 U/L    Protein, total 5.6 (L) 6.4 - 8.2 g/dL    Albumin 2.5 (L) 3.4 - 5.0 g/dL    Globulin 3.1 2.0 - 4.0 g/dL    A-G Ratio 0.8 0.8 - 1.7     MAGNESIUM    Collection Time: 08/12/18  7:41 AM   Result Value Ref Range    Magnesium 1.6 1.6 - 2.6 mg/dL   PHOSPHORUS    Collection Time: 08/12/18  7:41 AM   Result Value Ref Range    Phosphorus 3.2 2.5 - 4.9 MG/DL   LACTIC ACID    Collection Time: 08/12/18  7:41 AM   Result Value Ref Range    Lactic acid 1.4 0.4 - 2.0 MMOL/L   CBC WITH AUTOMATED DIFF    Collection Time: 08/12/18  7:41 AM   Result Value Ref Range    WBC 13.5 (H) 4.6 - 13.2 K/uL    RBC 3.88 (L) 4.20 - 5.30 M/uL    HGB 11.4 (L) 12.0 - 16.0 g/dL    HCT 31.9 (L) 35.0 - 45.0 %    MCV 82.2 74.0 - 97.0 FL    MCH 29.4 24.0 - 34.0 PG    MCHC 35.7 31.0 - 37.0 g/dL    RDW 14.4 11.6 - 14.5 %    PLATELET 575 251 - 100 K/uL    MPV 10.7 9.2 - 11.8 FL    NEUTROPHILS 56 42 - 75 %    BAND NEUTROPHILS 38 (H) 0 - 5 %    LYMPHOCYTES 2 (L) 20 - 51 %    MONOCYTES 4 2 - 9 %    EOSINOPHILS 0 0 - 5 %    BASOPHILS 0 0 - 3 %    ABS. NEUTROPHILS 7.6 1.8 - 8.0 K/UL    ABS. LYMPHOCYTES 0.3 (L) 0.8 - 3.5 K/UL    ABS. MONOCYTES 0.5 0 - 1.0 K/UL    ABS. EOSINOPHILS 0.0 0.0 - 0.4 K/UL    ABS. BASOPHILS 0.0 0.0 - 0.1 K/UL    PLATELET COMMENTS LARGE PLATELETS      RBC COMMENTS NORMOCYTIC, NORMOCHROMIC      WBC COMMENTS DOHLE BODIES      DF MANUAL     CARDIAC PANEL,(CK, CKMB & TROPONIN)    Collection Time: 08/12/18  7:41 AM   Result Value Ref Range     26 - 192 U/L    CK - MB <1.0 <3.6 ng/ml    CK-MB Index  0.0 - 4.0 %     CALCULATION NOT PERFORMED WHEN RESULT IS BELOW LINEAR LIMIT    Troponin-I, Qt. <0.02 0.0 - 0.045 NG/ML   ECHO ADULT COMPLETE    Collection Time: 08/12/18  9:38 AM   Result Value Ref Range    Tapse 0.87 (A) 1.5 - 2.0 cm    LVIDd 1.59 (A) 3.9 - 5.3 cm    LVPWd 1.37 (A) 0.6 - 0.9 cm    LVIDs 1.96 cm    IVSd 1.62 (A) 0.6 - 0.9 cm    LV Mass AL 82.2 67 - 162 g    LV Mass AL Index 46.8 g/m2    Triscuspid Valve Regurgitation Peak Gradient 26.2 mmHg    TR Max Velocity 255.85 cm/s           No results for input(s): FIO2I, IFO2, HCO3I, IHCO3, HCOPOC, PCO2I, PCOPOC, IPHI, PHI, PHPOC, PO2I, PO2POC in the last 72 hours. No lab exists for component: IPOC2  Telemetry:AFIB    Imaging: Wet read of CXR following line placement: No acute cardiopulmonary process. Right IJ in good position.   I have personally reviewed the patients radiographs and have reviewed the reports:  CXR Results  (Last 48 hours)    None         Best practice :    Glycemic control  IHI ICU bundles:    MVentilated patients- VAP bundle , aim to keep peak plateau pressure 38-28DW H2O  Sress ulcer prophylaxis  DVT prophylaxis  Need for Lines, chung assessed  Restraints need. Palliative care consult       Central Line Bundle Followed        Total of 120 min critical care time spent at bedside during the course of care providing evaluation,management and care decisions and ordering appropriate treatment related to critical care problems exclusive of procedures. The reason for providing this level of medical care for this critically ill patient was due a critical illness that impaired one or more vital organ systems such that there was a high probability of imminent or life threatening deterioration in the patients condition. This care involved high complexity decision making to assess, manipulate, and support vital system functions, to treat this degree vital organ system failure and to prevent further life threatening deterioration of the patients condition.     Sachin Barnard MD

## 2018-08-13 NOTE — ROUTINE PROCESS
Bedside and Verbal shift change report given to Harrell Barthel., RN (oncoming nurse) by JARVIS Chisholm (offgoing nurse). Report included the following information SBAR, Kardex, Intake/Output, MAR and Recent Results.

## 2018-08-13 NOTE — PROGRESS NOTES
[facility]  ICU Progress Note      Name: Ella Hernandez   : 1935   MRN: [id number]   Date: 2018 8:08 AM     []I have reviewed the flowsheet and previous days notes. Events overnight reviewed and discussed with nursing staff. Vital signs and records reviewed. Subjective:    Several episodes of vomiting overnight and this AM. Pt given zofran, still vomiting. [x]The patient is unable to give any meaningful history or review of systems because the patient is:  []Intubated [x]Sedated   [x]Unresponsive      [x]The patient is critically ill on      []Mechanical ventilation [x]Pressors   []BiPAP []                  ROS:Review of systems not obtained due to patient factors.     Medication Review:  · Pressors - [email or web address], Vaso @ 0.04  · Sedation - None  · Antibiotics - Zozyn  · Pain - prn  · GI/ DVT - GI-Pepcid/ DVT- Hep gtt  · Others (other gtts) Amio @1mg/kg    Safety Bundles: CAUTI/ Severe Sepsis Protocol/ Electrolyte Replacement Protocol    Vital Signs:    Visit Vitals    /57    Pulse 60    Temp 99.1 °F (37.3 °C)    Resp 20    Ht 5' 4\" (1.626 m)    Wt 76.3 kg (168 lb 3.4 oz)    SpO2 92%    BMI 28.87 kg/m2       O2 Device: Hi flow nasal cannula   O2 Flow Rate (L/min): 50 l/min   Temp (24hrs), Av °F (37.2 °C), Min:98 °F (36.7 °C), Max:100 °F (37.8 °C)       Intake/Output:   Last shift:         Last 3 shifts: 1901 -  0700  In: 1335.6 [P.O.:360; I.V.:975.6]  Out: 700 [Urine:700]    Intake/Output Summary (Last 24 hours) at 188  Last data filed at 18   Gross per 24 hour   Intake            975.6 ml   Output              700 ml   Net            275.6 ml       Ventilator Settings:                Physical Exam:    General: On High flow, actively vomiting   HEENT:  Anicteric sclerae; pink palpebral conjunctivae; mucosa moist  Resp:  Symmetrical chest expansion, no accessory muscle use; good airway entry; crackles over anterior lung fields CV:  S1, S2 present; regular rate and rhythm  GI:  Abdomen slightly distended, umbilical hernia; (-) active bowel sounds  Extremities: No edema/ cyanosis/ clubbing noted; normal capillary refill  Skin:  Warm; no rashes/ lesions noted  Neurologic:  Non-focal  Devices:  No NGT/OGT, Central line      DATA:     Current Facility-Administered Medications   Medication Dose Route Frequency    ondansetron (ZOFRAN) injection 4 mg  4 mg IntraVENous Q4H PRN    insulin lispro (HUMALOG) injection   SubCUTAneous Q6H    famotidine (PF) (PEPCID) 20 mg in sodium chloride 0.9% 10 mL injection  20 mg IntraVENous DAILY    piperacillin-tazobactam (ZOSYN) 2.25 gram injection        0.9% sodium chloride (MBP/ADV) infusion        piperacillin-tazobactam (ZOSYN) 3.375 g in 0.9% sodium chloride (MBP/ADV) 100 mL MBP  3.375 g IntraVENous Q6H    vasopressin (VASOSTRICT) 100 Units in 0.9% sodium chloride 100 mL infusion  0-0.04 Units/min IntraVENous TITRATE    amiodarone (NEXTERONE) 360 mg in dextrose 200 mL (1.8 mg/mL) infusion  1 mg/min IntraVENous CONTINUOUS    PHENYLephrine (KE-SYNEPHRINE) 90 mg in 0.9% sodium chloride 250 mL infusion (premix or compounded)   mcg/min IntraVENous TITRATE    heparin 25,000 units in D5W 250 ml infusion  18-36 Units/kg/hr IntraVENous TITRATE    0.9% sodium chloride infusion  125 mL/hr IntraVENous CONTINUOUS    glucose chewable tablet 16 g  4 Tab Oral PRN    glucagon (GLUCAGEN) injection 1 mg  1 mg IntraMUSCular PRN    dextrose (D50W) injection syrg 12.5-25 g  25-50 mL IntraVENous PRN    morphine injection 2 mg  2 mg IntraVENous Q2H PRN    levothyroxine (SYNTHROID) tablet 100 mcg  100 mcg Oral 6am    gabapentin (NEURONTIN) capsule 900 mg  900 mg Oral QHS    conjugated estrogens (PREMARIN) 0.625 mg/gram vaginal cream 0.5 g  0.5 g Vaginal DAILY    sucralfate (CARAFATE) tablet 1 g  1 g Oral QID    LORazepam (ATIVAN) tablet 0.5 mg  0.5 mg Oral Q8H PRN    escitalopram oxalate (LEXAPRO) tablet 20 mg  20 mg Oral DAILY    sodium chloride (NS) flush 5-10 mL  5-10 mL IntraVENous Q8H    sodium chloride (NS) flush 5-10 mL  5-10 mL IntraVENous PRN    acetaminophen (TYLENOL) tablet 650 mg  650 mg Oral Q6H    oxyCODONE IR (ROXICODONE) tablet 5 mg  5 mg Oral Q4H PRN    docusate sodium (COLACE) capsule 100 mg  100 mg Oral BID         Labs: Results:       Chemistry Recent Labs      08/13/18 0235 08/12/18 2030 08/12/18 0741   GLU  297*  319*  102*   NA  130*  128*  128*   K  4.1  4.4  3.9   CL  98*  96*  94*   CO2  21  21  21   BUN  51*  49*  50*   CREA  2.17*  2.47*  2.76*   CA  7.9*  7.2*  8.0*   AGAP  11  11  13   BUCR  24*  20  18   AP  85   --   69   TP  6.0*   --   5.6*   ALB  2.3*   --   2.5*   GLOB  3.7   --   3.1   AGRAT  0.6*   --   0.8      CBC w/Diff Recent Labs      08/13/18 0235 08/12/18 0741   WBC  16.5*  13.5*   RBC  3.71*  3.88*   HGB  10.7*  11.4*   HCT  31.9*  31.9*   PLT  335  253   GRANS  69  56   LYMPH  2*  2*   EOS  0  0      Coagulation Recent Labs      08/13/18 0235 08/12/18 2030   APTT  123.4*  >180.0*       Liver Enzymes Recent Labs      08/13/18 0235   TP  6.0*   ALB  2.3*   AP  85   SGOT  267*      ABG Lab Results   Component Value Date/Time    PHI 7.234 (LL) 08/13/2018 04:40 AM    PCO2I 35.9 08/13/2018 04:40 AM    PO2I 55 (L) 08/13/2018 04:40 AM    HCO3I 15.2 (L) 08/13/2018 04:40 AM    FIO2I 48 08/13/2018 04:40 AM      Microbiology Recent Labs      08/12/18   2300 08/12/18   0741   CULT  NO GROWTH AFTER 8 HOURS  NO GROWTH AFTER 20 HOURS          Telemetry: []Sinus []A-flutter []Paced    []A-fib []Multiple PVCs                    Imaging:  CXR Results  (Last 48 hours)               08/13/18 0558  XR CHEST PORT Final result    Impression:  IMPRESSION:       1. Right IJ central venous line in place. Distal tip at the superior   cavoatrial junction. 2.  Focal rapidly developing opacity in the right mid lung zone.   Aggressive   pneumonia vs. aspiration vs. pulmonary hemorrhage. 3.  Increasing streaky densities in both lungs. Suggestive of atelectatic   changes vs. infiltrate. Narrative:  PROCEDURE:  Chest Portable       CPT code 55918       INDICATION:  Evaluation for infiltrate. COMPARISON:  8/12/18. FINDINGS:       - Right IJ central venous line placement. Distal tip at the superior cavoatrial   junction.   - Right mid lung zone 7.8 x 7.2 cm opacity, with acute development since the   8/12/18 study. Most likely from either rapidly developing consolidative process   vs. aspiration vs. pulmonary hemorrhage.   - Streaky densities in the right lower lung zone distinctly new or increased   since 8/12/18. Increased streaky densities in the left mid to lower lung zones   as well. - No pneumothorax. 08/12/18 1150  XR CHEST PORT Final result    Impression:  IMPRESSION:   1. Right IJ catheter with the tip overlying the SVC is noted. 2.  Mild chronic interstitial lung changes. Narrative:  EXAM: Chest X-Ray       INDICATION: Central venous catheter placement       TECHNIQUE: AP view of the chest       COMPARISON: 8/9/2018, 4/29/2018 and 9/22/2014       FINDINGS:    Tubes and Lines: A right IJ catheter is present with the tip at the SVC/right   atrial junction. Pleura: No pneumothorax appreciated. No effusions appreciated. Lungs:  Mild interstitial thickening is noted bilaterally which is more   conspicuous than prior imaging. Cardiac silhouette: Cardiac silhouette is at the upper limits of normal.       Pulmonary Vascularity: Mild prominence of the pulmonary vasculature is noted. Osseous Structures: Severe degenerative changes of the shoulders are noted. Possible loose bodies of the right shoulder are noted. CT Results  (Last 48 hours)    None                IMPRESSION:   · New afib with RVR symptomatic refractory to cardioversion x 3.  Converted to sinus s/p amiodarone and pressors. · Possible Pneumonia-CXR today showing consolidation and infiltrates  · Poss PE - started heparin drip empirically while waiting further evaluation. · Acute renal insufficiency  · POD 2 right hip replacment  · History of hypothyroidism  · HTN        PLAN:   · Resp: Adequate oxygenation and ventilation at this time. Will get bedside duplex US for DVT, already empirically treating for PE with Hep, hesitant to have patient do any testing that requires her to transport (CTA V/Q). · ID: Afebrile but developing consolidation and infiltrates on CXR this AM. WBC elevated to 16 this AM.  Cont Zosyn and Vanc  · CV: Afib with RVR with resultant hypotension now resolved s/p conversion to sinus rhythm. On Ji and Vaso, will continue to monitor closely and maintain MAPs > 65 and continue amiodarone drip. · GI: NPO for now, Place NG tube, cont zofran  · Renal: Bedside renal US, concern for hydronephrosis. Hold on contrasted study. Fluids; renally dose meds; avoid nephrotoxins. · Heme: Continue heparin drip. · Endo: Glucose sliding scale. Continue synthroid.   · Neuro: prn Ativan for anxiety  · Prophylaxis: GI-Pepcid, DVT- heparin gtt  · Discussed in interdisciplinary rounds          The patient is: [x] acutely ill Risk of deterioration: [] moderate    [x] critically ill  [x] high     []See my orders for details    My assessment/plan was discussed with:  [x]nursing []PT/OT    [x]respiratory therapy [x]Dr. Deyvi Hassan []     []Total critical care time exclusive of procedures       minutes    Cathy Berkowitz MD        Assessment  Septic shock  uti - 100k GNR  Aspiration pneumonia  Acute hypoxemic resp failure  Acute renal failure  Metabolic acidosis  Hyperglycemia  Hip fracture    Need for ngt  Continue zosyn and vanco  Add levaquin with aspiration to double cover enteric gnr  Also with multiple pressors would double cover the gnr in urine until identified and sensitivities back  Wean pressors as tolerates  Add lantus  Consider addition of hydrocortisone  Monitor resp status, discuss with family   On heparin drip with possible concern PE due to hypoxemia  Likely hypoxemia due to infiltrate, however, will get LE dopplers,  If positive, contineu heparin. If negative, will get echo to eval right heart. If no indication of right heart strain will stop heparin as even if she had pe would not be hemodynamically compromising and heparin likely more risk.       Cc time 60 min

## 2018-08-13 NOTE — PROGRESS NOTES
[facility]  Progress Note    Patient: Nan Valle Age: [de-identified] y.o. : 1935 MR#: [id number] SSN: [id number]  Date/Time: 2018    Subjective:     Pt seen with family @ bedside. Case d/w ortho earlier today as well as ICU staff. Appears comfortable. On comfort measures and off pressor support. Assessment/Plan:   1. Comfort care. Previously tx medical issues follow below. 2. AFib c RVR - was on amiodarone and heparin gttg. No new issues presently. 3. Septic shock from UTI with hypotension - U/A results reviewed. Was started on abx, now on comfort care. 4. HypoNa+, hypoMg2+ - noted. As above. 5. Known hx HTN, DM2, hyperlipidemia, hypothyroidism. 6. Here s/p closed reduction R total hip replacement. 7. Chart reviewed, known hx recurrent UTIs, gross hematuria. Hx of B/L congenital primary hydronephrosis. Additional Notes:      Case discussed with:  []Patient  [x]Family  [x]Nursing  []Case Management  DVT Prophylaxis:  []Lovenox  []Hep SQ  []SCDs  []Coumadin   []On Heparin gtt    Objective:   VS:   Visit Vitals    BP (!) 75/41    Pulse 64    Temp 99.6 °F (37.6 °C)    Resp 21    Ht 5' 4\" (1.626 m)    Wt 76.3 kg (168 lb 3.4 oz)    SpO2 96%    BMI 28.87 kg/m2      Tmax/24hrs: Temp (24hrs), Av °F (37.2 °C), Min:98 °F (36.7 °C), Max:99.7 °F (37.6 °C)    Input/Output:   Intake/Output Summary (Last 24 hours) at 18 1529  Last data filed at 18 1353   Gross per 24 hour   Intake          5054.93 ml   Output             1000 ml   Net          4054.93 ml       General:  Sleeping, opens eyes during exam. NAD. Cardiovascular:  RRR. Pulmonary:  CTA B ant.   GI:  Soft, no apparent TTP. ND. NABS. Extremities:  No CT or edema.    Additional:      Labs:    Recent Results (from the past 24 hour(s))   CULTURE, URINE    Collection Time: 18  6:00 PM   Result Value Ref Range    Special Requests: NO SPECIAL REQUESTS      Culture result: >100,000 COLONIES/mL GRAM NEGATIVE RODS (A)     PTT    Collection Time: 08/12/18  6:00 PM   Result Value Ref Range    aPTT 131.7 (H) 23.0 - 36.4 SEC   URINALYSIS W/MICROSCOPIC    Collection Time: 08/12/18  6:00 PM   Result Value Ref Range    Color YELLOW      Appearance TURBID      Specific gravity 1.014 1.005 - 1.030      pH (UA) 5.0 5.0 - 8.0      Protein 300 (A) NEG mg/dL    Glucose NEGATIVE  NEG mg/dL    Ketone NEGATIVE  NEG mg/dL    Bilirubin NEGATIVE  NEG      Blood LARGE (A) NEG      Urobilinogen 0.2 0.2 - 1.0 EU/dL    Nitrites NEGATIVE  NEG      Leukocyte Esterase LARGE (A) NEG      WBC TOO NUMEROUS TO COUNT 0 - 4 /hpf    RBC  0 - 5 /hpf     UNABLE TO QUANTITATE MICROSCOPIC PARAMETERS DUE TO EXCESSIVE WBCS    Epithelial cells  0 - 5 /lpf     UNABLE TO QUANTITATE MICROSCOPIC PARAMETERS DUE TO EXCESSIVE WBCS    Bacteria (A) NEG /hpf     UNABLE TO QUANTITATE MICROSCOPIC PARAMETERS DUE TO EXCESSIVE WBCS   GLUCOSE, POC    Collection Time: 08/12/18  6:03 PM   Result Value Ref Range    Glucose (POC) 313 (H) 70 - 110 mg/dL   PTT    Collection Time: 08/12/18  8:30 PM   Result Value Ref Range    aPTT >180.0 (HH) 23.0 - 82.1 SEC   METABOLIC PANEL, BASIC    Collection Time: 08/12/18  8:30 PM   Result Value Ref Range    Sodium 128 (L) 136 - 145 mmol/L    Potassium 4.4 3.5 - 5.5 mmol/L    Chloride 96 (L) 100 - 108 mmol/L    CO2 21 21 - 32 mmol/L    Anion gap 11 3.0 - 18 mmol/L    Glucose 319 (H) 74 - 99 mg/dL    BUN 49 (H) 7.0 - 18 MG/DL    Creatinine 2.47 (H) 0.6 - 1.3 MG/DL    BUN/Creatinine ratio 20 12 - 20      GFR est AA 23 (L) >60 ml/min/1.73m2    GFR est non-AA 19 (L) >60 ml/min/1.73m2    Calcium 7.2 (L) 8.5 - 10.1 MG/DL   CULTURE, BLOOD    Collection Time: 08/12/18 11:00 PM   Result Value Ref Range    Special Requests: NO SPECIAL REQUESTS      Culture result: NO GROWTH AFTER 8 HOURS     GLUCOSE, POC    Collection Time: 08/12/18 11:56 PM   Result Value Ref Range    Glucose (POC) 326 (H) 70 - 219 mg/dL   METABOLIC PANEL, BASIC    Collection Time: 08/13/18  2:35 AM   Result Value Ref Range    Sodium 130 (L) 136 - 145 mmol/L    Potassium 4.1 3.5 - 5.5 mmol/L    Chloride 98 (L) 100 - 108 mmol/L    CO2 21 21 - 32 mmol/L    Anion gap 11 3.0 - 18 mmol/L    Glucose 297 (H) 74 - 99 mg/dL    BUN 51 (H) 7.0 - 18 MG/DL    Creatinine 2.17 (H) 0.6 - 1.3 MG/DL    BUN/Creatinine ratio 24 (H) 12 - 20      GFR est AA 26 (L) >60 ml/min/1.73m2    GFR est non-AA 22 (L) >60 ml/min/1.73m2    Calcium 7.9 (L) 8.5 - 10.1 MG/DL   CBC WITH AUTOMATED DIFF    Collection Time: 08/13/18  2:35 AM   Result Value Ref Range    WBC 16.5 (H) 4.6 - 13.2 K/uL    RBC 3.71 (L) 4.20 - 5.30 M/uL    HGB 10.7 (L) 12.0 - 16.0 g/dL    HCT 31.9 (L) 35.0 - 45.0 %    MCV 86.0 74.0 - 97.0 FL    MCH 28.8 24.0 - 34.0 PG    MCHC 33.5 31.0 - 37.0 g/dL    RDW 14.8 (H) 11.6 - 14.5 %    PLATELET 712 309 - 961 K/uL    MPV 10.2 9.2 - 11.8 FL    NEUTROPHILS 69 42 - 75 %    BAND NEUTROPHILS 21 (H) 0 - 5 %    LYMPHOCYTES 2 (L) 20 - 51 %    MONOCYTES 8 2 - 9 %    EOSINOPHILS 0 0 - 5 %    BASOPHILS 0 0 - 3 %    ABS. NEUTROPHILS 14.9 (H) 1.8 - 8.0 K/UL    ABS. LYMPHOCYTES 0.3 (L) 0.8 - 3.5 K/UL    ABS. MONOCYTES 1.3 (H) 0 - 1.0 K/UL    ABS. EOSINOPHILS 0.0 0.0 - 0.4 K/UL    ABS.  BASOPHILS 0.0 0.0 - 0.06 K/UL    DF MANUAL      PLATELET COMMENTS ADEQUATE PLATELETS      RBC COMMENTS KENDRA CELLS  1+       PTT    Collection Time: 08/13/18  2:35 AM   Result Value Ref Range    aPTT 123.4 (H) 23.0 - 36.4 SEC   MAGNESIUM    Collection Time: 08/13/18  2:35 AM   Result Value Ref Range    Magnesium 1.9 1.6 - 2.6 mg/dL   PHOSPHORUS    Collection Time: 08/13/18  2:35 AM   Result Value Ref Range    Phosphorus 4.7 2.5 - 4.9 MG/DL   LIPASE    Collection Time: 08/13/18  2:35 AM   Result Value Ref Range    Lipase 151 73 - 393 U/L   HEPATIC FUNCTION PANEL    Collection Time: 08/13/18  2:35 AM   Result Value Ref Range    Protein, total 6.0 (L) 6.4 - 8.2 g/dL    Albumin 2.3 (L) 3.4 - 5.0 g/dL    Globulin 3.7 2.0 - 4.0 g/dL    A-G Ratio 0.6 (L) 0.8 - 1.7      Bilirubin, total 0.3 0.2 - 1.0 MG/DL    Bilirubin, direct 0.2 0.0 - 0.2 MG/DL    Alk. phosphatase 85 45 - 117 U/L    AST (SGOT) 267 (H) 15 - 37 U/L    ALT (SGPT) 139 (H) 13 - 56 U/L   POC G3    Collection Time: 08/13/18  4:40 AM   Result Value Ref Range    Device: NASAL CANNULA      Flow rate (POC) 7 L/M    FIO2 (POC) 48 %    pH (POC) 7.234 (LL) 7.35 - 7.45      pCO2 (POC) 35.9 35.0 - 45.0 MMHG    pO2 (POC) 55 (L) 80 - 100 MMHG    HCO3 (POC) 15.2 (L) 22 - 26 MMOL/L    sO2 (POC) 83 (L) 92 - 97 %    Base deficit (POC) 12 mmol/L    Allens test (POC) YES      Total resp.  rate 24      Site RIGHT RADIAL      Specimen type (POC) ARTERIAL      Performed by Fran Ellis, POC    Collection Time: 08/13/18  5:38 AM   Result Value Ref Range    Glucose (POC) 276 (H) 70 - 110 mg/dL   PTT    Collection Time: 08/13/18 11:35 AM   Result Value Ref Range    aPTT 65.5 (H) 23.0 - 36.4 SEC   GLUCOSE, POC    Collection Time: 08/13/18 11:38 AM   Result Value Ref Range    Glucose (POC) 220 (H) 70 - 110 mg/dL     Additional Data Reviewed:      Signed By: Joaquín Cohen MD     August 13, 2018 3:29 PM

## 2018-08-13 NOTE — PROGRESS NOTES
Spoke with family at length regarding patient condition. They have discussed this previous and currently and state this is not what their Mom would want. They wish to make her comfort care. Will stop current studies and medications. Will start morphine and ativan prn.

## 2018-08-13 NOTE — PROGRESS NOTES
0900 Pt vomited small amount of brown liquid mouth suctioned & ngt attempted, per bedside order, x2 without success.  Tube flows smoothly until need to swallow pt starts to vocalize & move head, placement unsuccessful & on hold until speaking with family per MD   Pt given zofran & no further vomitus

## 2018-08-13 NOTE — ROUTINE PROCESS
Bedside, Verbal and Written shift change report given to Osmel Guzmán (oncoming nurse) by Eddy Walsh RN (offgoing nurse). Report included the following information SBAR, Kardex, ED Summary, OR Summary, Procedure Summary, Intake/Output, MAR, Accordion, Recent Results, Med Rec Status and Alarm Parameters .

## 2018-08-13 NOTE — PROGRESS NOTES
High flow cannula check:  Patient found to be on the followin/13/18 1019   Oxygen Therapy   O2 Sat (%) 96 %   Pulse via Oximetry 66 beats per minute   O2 Device Hi flow nasal cannula   O2 Flow Rate (L/min) 55 l/min   O2 Temperature 87.3 °F (30.7 °C)   FIO2 (%) 96 %

## 2018-08-13 NOTE — PROGRESS NOTES
Per Ortho,    Patient seen at bedside, s/p closed reduction dislocated right hip last Thursday 8-9-18, had a rapid response for hypotension and tachy. Transferred to ICU, essentially patient in metabolic failure. No orthopaedic pressing concerning. Per Ortho, care transferred to Nenita Rick MD, Hospitalist [facility] for medical management. Discussion with family today indicated that \"patient\" now DNR with \"comfort measures\" pending. Recommend remove RLE SLI, for comfort, and support of families \"end of life\" journey by Mrs. Parada Wu    V/r

## 2018-08-13 NOTE — PROGRESS NOTES
[facility]  -  Progress Note      Patient: Lala Simmons MRN: [id number]  SSN: [id number]    YOB: 1935  Age: [de-identified] y.o. Sex: female      Admit Date: 8/9/2018    Assessment:     Hospital Problems  Date Reviewed: 8/7/2018          Codes Class Noted POA    * (Principal)Dislocation of internal right hip prosthesis ([facility] ICD-10-CM: T84.020A  ICD-9-CM: 996.42, V43.64  8/9/2018 Unknown        Dislocated hip, right, initial encounter [facility]) ICD-10-CM: S73.004A  ICD-9-CM: 835.00  8/9/2018 Unknown        Type 2 diabetes mellitus without complication, without long-term current use of insulin ([facility] ICD-10-CM: E11.9  ICD-9-CM: 250.00  11/7/2016 Yes        Vertigo ICD-10-CM: R42  ICD-9-CM: 780.4  1/28/2011 Yes    Overview Signed 1/28/2011  9:23 AM by Holly Dela Cruz NP     Currently under care Dr. Cassidy Kemp MRI brain normal.             HTN (hypertension) ICD-10-CM: I10  ICD-9-CM: 401.9  1/11/2010 Yes        Hearing loss ICD-10-CM: H91.90  ICD-9-CM: 389.9  1/11/2010 Yes               -A-fib RVR with hypotension- now improving with rates in the 130-140's and SBP in the upper 90's on robert. Required multiple synchronized shocks that did not greatly improve her rates or pressures. -Recent closed reduction of R hip fracture. She has had a low grade fever post-op and may be septic  -Nuclear stress test in 11/2015 that was normal  -History of HTN  -History of DM  -History of dyslipidemia     Atrial Fibrillation CHADSVASC2 Score Stroke Risk:   [de-identified] y.o. > 76        +2    female Female +1   CHF HX: No    + 0   HTN HX: Yes    +1   Stroke/TIA/Thromboembolism No    +0   Vascular Disease HX: No    + 0   Diabetes Mellitus Yes    +1   CHADSVASC 2 Score 5      Annual Stroke Risk 7.2% - moderate-high             -No primary cardiologist      Plan:      Will still need to remain on pressors  Will keep on amiodarone for now and will discuss with MD if need to consider switching off now that in sinus for almost 24 hours. Would still consider pursuing ruling out PE for this patient  Continue with heparin until more definitive diagnosis can be made as to the etiology behind her sudden change. She has most likely aspirated along with all of this. Defer to [address] [place] [address] team for management.      Subjective:     Patient remains on heparin, amiodarone, robert for BP support    Patient still with confusion    Objective:      Patient Vitals for the past 8 hrs:   Temp Pulse Resp BP SpO2   08/13/18 0800 99.7 °F (37.6 °C) - - - -   08/13/18 0712 - 60 - 100/57 -   08/13/18 0555 - - - - 92 %   08/13/18 0530 - 60 20 112/63 92 %   08/13/18 0500 - 63 20 117/66 (!) 89 %   08/13/18 0430 - 71 18 123/57 -   08/13/18 0400 99.1 °F (37.3 °C) 61 17 118/66 92 %   08/13/18 0330 - (!) 59 16 106/50 90 %   08/13/18 0300 - 64 27 (!) 115/34 (!) 88 %   08/13/18 0230 - 63 18 111/70 92 %   08/13/18 0200 - (!) 57 16 98/51 91 %         Patient Vitals for the past 96 hrs:   Weight   08/13/18 0000 76.3 kg (168 lb 3.4 oz)   08/12/18 0937 70.3 kg (155 lb)   08/09/18 1333 70.3 kg (155 lb)         Intake/Output Summary (Last 24 hours) at 08/13/18 3830  Last data filed at 08/12/18 2000   Gross per 24 hour   Intake            975.6 ml   Output              700 ml   Net            275.6 ml       Physical Exam:  General:  cooperative, no distress, appears stated age, confused  Neck:  no JVD  Lungs:  Crackles with wheeze R mid lung field anterior  Heart:  regular rate and rhythm, S1, S2 normal, no murmur, click, rub or gallop  Abdomen:  abdomen is soft without significant tenderness, masses, organomegaly or guarding  Extremities:  extremities normal, atraumatic, no cyanosis or edema but with some mottling     Data Review:     Labs: Results:       Chemistry Recent Labs      08/13/18   0235  08/12/18 2030 08/12/18   0741   GLU  297*  319*  102*   NA  130*  128*  128*   K  4.1  4.4  3.9   CL  98*  96*  94*   CO2  21  21  21   BUN  51*  49*  50*   CREA  2.17*  2.47*  2.76* CA  7.9*  7.2*  8.0*   MG  1.9   --   1.6   PHOS  4.7   --   3.2   AGAP  11  11  13   BUCR  24*  20  18   AP  85   --   69   TP  6.0*   --   5.6*   ALB  2.3*   --   2.5*   GLOB  3.7   --   3.1   AGRAT  0.6*   --   0.8      CBC w/Diff Recent Labs      08/13/18   0235  08/12/18   0741   WBC  16.5*  13.5*   RBC  3.71*  3.88*   HGB  10.7*  11.4*   HCT  31.9*  31.9*   PLT  335  253   GRANS  69  56   LYMPH  2*  2*   EOS  0  0      Cardiac Enzymes No results found for: CPK, CK, CKMMB, CKMB, RCK3, CKMBT, CKNDX, CKND1, JEAEN, TROPT, TROIQ, ROSY, TROPT, TNIPOC, BNP, BNPP   Coagulation Recent Labs      08/13/18   0235  08/12/18 2030   APTT  123.4*  >180.0*       Lipid Panel Lab Results   Component Value Date/Time    Cholesterol, total 201 (H) 12/18/2017 11:57 AM    Cholesterol (POC) 242 07/13/2012 10:34 AM    HDL Cholesterol 62 (H) 12/18/2017 11:57 AM    LDL, calculated 102.2 (H) 12/18/2017 11:57 AM    VLDL, calculated 36.8 12/18/2017 11:57 AM    Triglyceride 184 (H) 12/18/2017 11:57 AM    Triglycerides (POC) 246 07/13/2012 10:34 AM    CHOL/HDL Ratio 3.2 12/18/2017 11:57 AM      BNP No results found for: BNP, BNPP, XBNPT   Liver Enzymes Recent Labs      08/13/18   0235   TP  6.0*   ALB  2.3*   AP  85   SGOT  267*      Digoxin    Thyroid Studies Lab Results   Component Value Date/Time    TSH 0.91 08/12/2018 07:41 AM

## 2018-08-13 NOTE — PROGRESS NOTES
Pt vomited twice overnight with possible aspiration, increasing pressor requirement, and labile oxygen saturation with questionable pleth. ABG obtained showed metabolic acidosis with PO2 55 on 6 L NC. Preliminary CXR showing worsening right sided infiltrate consistent with aspiration. Results for Rio Ulrich (MRN [id number]) as of 8/13/2018 06:39   Ref. Range 8/13/2018 04:40   pH (POC) Latest Ref Range: 7.35 - 7.45   7.234 (LL)   pCO2 (POC) Latest Ref Range: 35.0 - 45.0 MMHG 35.9   pO2 (POC) Latest Ref Range: 80 - 100 MMHG 55 (L)   HCO3 (POC) Latest Ref Range: 22 - 26 MMOL/L 15.2 (L)   sO2 (POC) Latest Ref Range: 92 - 97 % 83 (L)     Plan:   - Pt started on OptiFlow, now saturating at 93%. - Follow-up readings on abdominal x-ray and cxr.   - Broaden ABX coverage, ceftriaxone switched to zosyn and vanc. MRSA nares ordered. Follow-up blood and urine cultures, obvious UTI on UA.     Signed By: Gerda Delgado PA-C     August 13, 2018 6:49 AM

## 2018-08-13 NOTE — PROGRESS NOTES
was asked to visit and provide support for daughters of patient, who has just been made [facility]. When  entered the room, two daughters were comforting and attending to patient's needs. One daughter said her mother had lived alone in an apartment until she fell last week. The daughter said her mother is not doing well and she became tearful. A grandson then entered the room and he was encouraged by the daughters to speak with his grandmother.  decided to allow the family to be with their loved one.  hugged the daughter and asked it it was alright to return a little while later and she said yes.  completed follow up visit with patient and offered Pastoral care, see flow sheets for interventions. Chaplains will continue to follow and will provide pastoral care  as needed or requested. 1500:  returned to provide support for two daughters. They both talked about their mother and shared her interests in reading and doing things with friends. Empathetic listening and pastoral support given.    Blas Spears 70  Board Certified [address] [place] [address]  Office [phone]

## 2018-08-13 NOTE — CONSULTS
Michael Reyes  MR#: [id number]  : 1935  ACCOUNT #: [de-identified]   DATE OF SERVICE: 2018    REASON FOR CONSULTATION:  Hypotension, concern for PE, new atrial fibrillation with RVR. HISTORY OF PRESENT ILLNESS:  Please refer to the admission H and P.    Briefly, the patient is an 74-year-old female with a past medical history significant for hypertension, diabetes, hypothyroidism, hyperlipidemia as well as osteoporosis, who was admitted under the orthopedic service for right hip pain after having a fall while getting out of bed. She was found to have a posterior dislocation of the prosthetic right hip based on plain films on admission. On , she had closed reduction of the total right hip replacement. She had been undergoing therapy efforts, when this morning, a rapid response was called for hypotension. She was found to have atrial fibrillation with RVR with a rate in the 748C, her systolic pressures were in the 70s. She was started on normal saline bolus, and attempts were made to cardiovert the patient, without success. She was started on pressor therapy, cardiology was consulted, and she was transferred to ICU. There was some concern for possible pulmonary embolism. At the current time, the patient is sleeping, somnolent, she is difficult to arouse to loud verbal or tactile stimuli. She will grimace and respond to light sternal rub, but does not verbalize to me at the current time. Presently, nurses attending to another critical patient. She has been maintained on pressor support consisting of vasopressin and phenylephrine, with good response and stable pressures. She has also been started on amiodarone, and her heart rate is now in sinus rhythm, and controlled. We were asked to follow this patient in consultation. REVIEW OF SYSTEMS:  Unable to be obtained due to patient's condition.     ALLERGIES:  NOTED DRUG ALLERGY TO PHENERGAN IN THE RECORDS, IT STATES THAT \"MADE THE PATIENT VERY HYPER AND EXTREMELY AGITATED. \"    MEDICATIONS:  She is presently on the pressor support as noted above, with IV phenylephrine and vasopressin. She is on an amiodarone drip as well as a heparin drip. She is also on Tylenol 650 mg p.o. q.6 hours, Premarin 0.625 mg per gram, vaginal cream 0.5 grams vaginally daily, Colace 100 mg p.o. b.i.d., Lexapro 20 mg p.o. every day, Neurontin 900 mg p.o. at bedtime, HCTZ 25 mg p.o. every day, Synthroid 100 mcg p.o. q.a.m., Cozaar 50 mg p.o. every day, Carafate 1 gram p.o. q.i.d. At the time of dictation, I have discontinued her HCTZ and Cozaar given her hypotension. She was also on the followin. Ativan 0.5 mg p.o. q.8 hours p.r.n. anxiety. 2.  Oxycodone IR 5 mg p.o. q.4 hours p.r.n. pain. PAST MEDICAL HISTORY:  1.  Diabetes mellitus type 2.  2.  Hypertension. 3.  Hypothyroidism. 4.  Osteoporosis. 5.  Hyperlipidemia. 6.  Macular degeneration. PAST SURGICAL HISTORY:    1. Most recently on  she had closed reduction right total hip replacement. 2.  Right hip replacement in , left was done in .  3.  She had some other HEENT surgeries performed as well. FAMILY HISTORY:  According to the records, sister had breast cancer, father and daughter both had history of heart attack. SOCIAL HISTORY:  According to the records, she has a 20-pack-year history of smoking, quit in . No alcohol use. PHYSICAL EXAMINATION:  VITAL SIGNS:  At the time of dictation, temperature was 100.0, pulse was documented at 147, however, at bedside she was in the 80s-90s. Respiratory rate 60, pressure 80/54, however, again at bedside her systolic pressure is in the 120s-130s. She has a last documented SpO2 of 84%, again at bedside. She is saturating in the high 90s on 2 liters per minute.   GENERAL:  On examination, the patient is a well-developed, well-nourished female who is presently sleepy and somnolent. She does briefly arouse to light to moderate sternal rub. She does not verbalize to me. HEENT:  Head is normocephalic, atraumatic. Pupils are equally round, slowly reactive to light, I cannot assess for extraocular movement as she does not follow commands presently. Nares patent on both sides. Complex oropharynx is clear. Mucous membranes are moist, cannot assess for midline of tongue. She does not follow commands. NECK:  Supple, there is no lymphadenopathy. CARDIOVASCULAR:  Regular rate and rhythm. I hear no murmurs, rubs or gallops. LUNGS:  Clear to auscultation bilaterally anteriorly. No wheeze. ABDOMEN:  Soft, nontender, nondistended. Normal bowel sounds. EXTREMITIES:  I cannot assess for motor strength as she does not follow commands. She has 2+ bilateral radial pulses, 2+ bilateral pedal pulses. Right lower extremity is braced. No edema. NEUROLOGIC:  As above. No evidence of any facial droop. Cannot assess for cranial nerve function testing as she does not follow commands. LABORATORY DATA:  Include a metabolic panel today with sodium 128, potassium 3.9, chloride 94, CO2 of 21, BUN 50, creatinine 2.76, calcium 8.0, magnesium 1.6, phosphorus of 3.2. A CBC with a white count of 13.5, H and H of 11.4/31.9. She has a significant left shift with 38% bands. She has had blood cultures which have been drawn today. Review of the lab work shows a urinalysis from 08/09/2018 showing yellow turbid urine, specific gravity of 1.011, large leukocyte esterase, negative nitrites, too numerous to count WBCs, unable to quantify RBCs. She had a chest x-ray today as well, showing mild chronic interstitial lung changes. She has a right IJ catheter tip overlying the SVC. Review of her EKG shows atrial fibrillation, rate in 160s.     She had a 2D echocardiogram performed today showing an ejection fraction of 66-70%, normal LV wall motion, no regional wall motion abnormality, borderline low right ventricular global systolic function, mild tricuspid valve regurgitation, pulmonary arterial systolic pressure of 34 mmHg, no evidence of pulmonary hypertension. Severely elevated central venous pressure, see report. ASSESSMENT AND PLAN:  An 82-year-old female with a history significant for the above who is here with atrial fibrillation with rapid ventricular response, hypotension, and acute metabolic encephalopathy. My recommendations are as follows:  1. Hypotension, concern for possible septic shock. She does have an elevated white count, she has had fevers as well noted yesterday of 100.9 and 101.3. Looking back at her labs, it looks like she may have a urinary tract infection. I will go ahead and send off for a urine culture. Maintain pressure support and wean as tolerated. We will initiate antibiotic therapy consisting of Rocephin. Continue to follow cultures. 2.  Hypotension:  Concern for hypotension due to septic shock, may be also transiently related to her atrial fibrillation, but there is also some concern for possible pulmonary embolism. She has been ordered a CTA of the chest.  She has been started on a heparin drip, continue. .  3.  Atrial fibrillation with rapid ventricular response, maintained on amiodarone drip. Consultant notes have been reviewed. We will continue to follow clinically. 4.  Hypomagnesemia: We will replete per protocol. 5.  Hyponatremia:  Review of the lab work shows that she only has had this with this hospitalization. She has been given a bolus of saline earlier, will maintain her on normal saline. 6.  Hypertension:  Holding her antihypertensives for the above. 7.  Diabetes mellitus: Will maintain her on a sliding scale insulin regimen. 8.  Hyperlipidemia:  No acute issues at the current time. It does not look like she is on any statin therapy for this. Outpatient followup otherwise.   9.  Hypothyroidism:  She is on Synthroid. Looking at her labs, it looks like the last TSH is from 01/2017 here. I am going to go ahead and check a TSH now. 10. She is status post closed reduction right total hip replacement performed on 08/10. Plan per orthopedics. 11.  We will continue to follow along with you. 12.  Will inquire nursing staff to see if she had received any sort of sedation that would be an explanation for her somnolence at the current time.       MD Nguyen Duran 46 / BN  D: 08/12/2018 15:21     T: 08/13/2018 06:14  JOB #: [id number]

## 2018-08-13 NOTE — ROUTINE PROCESS
Portable cxr done to r/o aspiration pneumonia. Zofran 4mg iv given for nausea and vomiting. KE synephrine titrated up tp keep sbp>100. No changes in neuro status. Desat down to low 80's and placed on hi flow to keep O2 SAT ABOVE>90'S.

## 2018-08-13 NOTE — DIABETES MGMT
Glycemic Control: Pt discussed in interdisciplinary rounds. Blood glucose elevated above targets. Lantus insulin 10 units daily ordered. Continue inpatient monitoring and intervention.      Kevin Levy, [address], [address] [facility]

## 2018-08-14 NOTE — ROUTINE PROCESS
1900 Received pt on comfort measures. Placed pt in 2L NC and morphine given. Family at bedside. 2330 Bedside shift change report given to [address] (oncoming nurse) by Crow Herzog RN   (offgoing nurse). Report included the following information SBAR, Intake/Output and Cardiac Rhythm . Jillian [place]   Mynor Hurd RN aware about pt wishes for body donation and to call immediately to the number on file for donation.

## 2018-08-14 NOTE — PROGRESS NOTES
[facility] Hospitalist Group  Progress Note    Patient: Jeffery Vargas Age: [de-identified] y.o. : 1935 MR#: [id number] SSN: [id number]  Date/Time: 2018    Subjective: In bed, eyes closed. Appears comfortable. Breathing appears shallow. Assessment/Plan:   1. Continue comfort care. Previously tx medical issues follow below. 2. AFib c RVR - was on amiodarone and heparin gttg. No new issues presently. 3. Septic shock from UTI with hypotension - U/A results reviewed. Was started on abx, now on comfort care. 4. HypoNa+, hypoMg2+ - noted. As above. 5. Known hx HTN, DM2, hyperlipidemia, hypothyroidism. 6. Here s/p closed reduction R total hip replacement. 7. Chart reviewed, known hx recurrent UTIs, gross hematuria. Hx of B/L congenital primary hydronephrosis. Additional Notes:      Case discussed with:  []Patient  []Family  [x]Nursing  []Case Management  DVT Prophylaxis:  []Lovenox  []Hep SQ  []SCDs  []Coumadin   []On Heparin gtt    Objective:   VS:   Visit Vitals    BP 90/62 (BP 1 Location: Right arm, BP Patient Position: At rest)    Pulse 100    Temp 97 °F (36.1 °C)    Resp 18    Ht 5' 4\" (1.626 m)    Wt 76.3 kg (168 lb 3.4 oz)    SpO2 90%    BMI 28.87 kg/m2      Tmax/24hrs: Temp (24hrs), Av.6 °F (36.4 °C), Min:97 °F (36.1 °C), Max:98.5 °F (36.9 °C)    Input/Output:     Intake/Output Summary (Last 24 hours) at 18 1636  Last data filed at 18 1313   Gross per 24 hour   Intake                0 ml   Output             1150 ml   Net            -1150 ml       General:  In bed, eyes closed. NAD. Cardiovascular:  RRR. Pulmonary:  CTA B ant with coarse BSounds. GI:  Soft, no apparent TTP. ND. NABS. Extremities:  No CT or edema.    Additional:      Labs:    Recent Results (from the past 24 hour(s))   GLUCOSE, POC    Collection Time: 18  5:43 PM   Result Value Ref Range    Glucose (POC) 165 (H) 70 - 110 mg/dL     Additional Data Reviewed:      Signed By: Steve Coleman MD     August 14, 2018 3:29 PM

## 2018-08-14 NOTE — PROGRESS NOTES
Patient Mews is a 4 due to Oxygen at 90 on 6L, and patient is responding to pain. Patient is on comfort care. Will continue to monitor and to make comfortable.

## 2018-08-15 NOTE — ROUTINE PROCESS
Bedside and Verbal shift change report given to Amirah Reddy, RN (oncoming nurse) by Mayuri Espinoza RN (offgoing nurse). Report included the following information SBAR, Kardex, MAR and Recent Results.     SITUATION:  Code Status: DNR  Reason for Admission: DX  Dislocated hip, right, initial encounter [facility])  Hospital day: 6  Problem List:       Hospital Problems  Date Reviewed: 8/7/2018          Codes Class Noted POA    * (Principal)Dislocation of internal right hip prosthesis ([facility] ICD-10-CM: T84.020A  ICD-9-CM: 996.42, V43.64  8/9/2018 Unknown        Dislocated hip, right, initial encounter [facility]) ICD-10-CM: S73.004A  ICD-9-CM: 835.00  8/9/2018 Unknown        Type 2 diabetes mellitus without complication, without long-term current use of insulin ([facility] ICD-10-CM: E11.9  ICD-9-CM: 250.00  11/7/2016 Yes        Vertigo ICD-10-CM: R42  ICD-9-CM: 780.4  1/28/2011 Yes    Overview Signed 1/28/2011  9:23 AM by Abdi Leon NP     Currently under care Dr. Geetha Noriega MRI brain normal.             HTN (hypertension) ICD-10-CM: I10  ICD-9-CM: 401.9  1/11/2010 Yes        Hearing loss ICD-10-CM: H91.90  ICD-9-CM: 389.9  1/11/2010 Yes              BACKGROUND:   Past Medical History:   Past Medical History:   Diagnosis Date    Allergies     Diabetes mellitus ([facility] 1/11/2010    Hearing loss 1/11/2010    HTN (hypertension) 1/11/2010    Hypercholesteremia 1/11/2010    Hypothyroidism 1/11/2010    Macular degeneration     shanae Matthew     Left femural neck    Otosclerosis     Left ear    Panic attack     Thyroid disease     hypothyroid    Unspecified adverse effect of anesthesia     hard to wake up in the past. Had own blood transfusion and was better      Patient taking anticoagulants no    Patient has a defibrillator: no    History of shots YES for example, flu, pneumonia, tetanus   Isolation History NO for example, MRSA, CDiff    ASSESSMENT:  Changes in Assessment Throughout Shift: None  Significant Changes in 24 hours (for example, RR/code, fall)  Patient has Central Line: yes Reasons if yes: Comfort care  Patient has Navarro Cath: yes Reasons if yes: Comfort care   Mobility Issues  PT  IV Patency  OR Checklist  Pending Tests    Last Vitals:  Vitals w/ MEWS Score (last day)     Date/Time MEWS Score Pulse Resp Temp BP Level of Consciousness SpO2    08/14/18 2000 1 66 18 97.6 °F (36.4 °C) 134/68 Alert 91 %    08/14/18 0800 -- -- -- -- -- -- 90 %    08/14/18 0744 4 100 18 97 °F (36.1 °C) 90/62 (!)  Responds to Pain 90 %    08/14/18 0443 -- -- -- -- -- -- 90 %    08/14/18 0049 2 78 20 97.3 °F (36.3 °C) 117/70 Responds to Voice (!)  85 %            PAIN    Pain Assessment    Pain Intensity 1: 0 (08/15/18 0430)    Pain Location 1: Incisional, Hip    Pain Intervention(s) 1: Medication (see MAR)    Patient Stated Pain Goal: Unable to verbalize/indicate pain  Intervention effective: yes  Time of last intervention: 0330 Reassessment Completed: yes   Other actions taken for pain: None    Last 3 Weights:  Last 3 Recorded Weights in this Encounter    08/09/18 1333 08/12/18 0937 08/13/18 0000   Weight: 70.3 kg (155 lb) 70.3 kg (155 lb) 76.3 kg (168 lb 3.4 oz)   Weight change:     INTAKE/OUPUT    Current Shift: 08/14 1901 - 08/15 0700  In: -   Out: 1800 [Urine:1800]    Last three shifts: 08/13 0701 - 08/14 1900  In: 4179.6 [I.V.:4179.6]  Out: 1700 [Urine:1700]    RECOMMENDATIONS AND DISCHARGE PLANNING  Patient needs and requests: Comfort care    Pending tests/procedures: None     Discharge plan for patient: TBD    Discharge planning Needs or Barriers: TBD    Estimated Discharge Date: TBD Posted on Whiteboard in Patients Room: no       \"HEALS\" SAFETY CHECK  A safety check occurred in the patient's room between off going nurse and oncoming nurse listed above.     The safety check included the below items:    H  High Alert Medications Verify all high alert medication drips (heparin, PCA, etc.)  E  Equipment Suction is set up for ALL patients (with femrin)  Red plugs utilized for all equipment (IV pumps, etc.)  WOWs wiped down at end of shift. Room stocked with oxygen, suction, and other unit-specific supplies  A  Alarms Bed alarm is set for fall risk patients  Ensure chair alarm is in place and activated if patient is up in a chair  L  Lines Check IV for any infiltration  Navarro bag is empty if patient has a Navarro   Tubing and IV bags are labeled  S  Safety  Room is clean, patient is clean, and equipment is clean. Hallways are clear from equipment besides carts. Fall bracelet on for fall risk patients  Ensure room is clear and free of clutter  Suction is set up for ALL patients (with fermin)  Hallways are clear from equipment besides carts.    Isolation precautions followed, supplies available outside room, sign posted    Jose Maria Al RN

## 2018-08-15 NOTE — PROGRESS NOTES
[facility] Hospitalist Group  Progress Note    Patient: Bess Olivas Age: [de-identified] y.o. : 1935 MR#: [id number] SSN: [id number]  Date/Time: 8/15/2018    Subjective:     Sleeping, eyes closed. Seen with family in room. Reports that pt has not been opening eyes today. Appears comfortable. Assessment/Plan:   1. Continue comfort care. Previously tx medical issues follow below. 2. AFib c RVR - was on amiodarone and heparin gtt. No new issues presently. 3. Septic shock from UTI with hypotension - U/A results reviewed. Was started on abx, now on comfort care. 4. HypoNa+, hypoMg2+ - noted. As above. 5. Known hx HTN, DM2, hyperlipidemia, hypothyroidism. 6. Here s/p closed reduction R total hip replacement. 7. Chart reviewed, known hx recurrent UTIs, gross hematuria. Hx of B/L congenital primary hydronephrosis. Additional Notes:      Case discussed with:  []Patient  [x]Family  [x]Nursing  []Case Management  DVT Prophylaxis:  []Lovenox  []Hep SQ  []SCDs  []Coumadin   []On Heparin gtt    Objective:   VS:   Visit Vitals    /75 (BP 1 Location: Right arm, BP Patient Position: At rest)    Pulse 82    Temp 97 °F (36.1 °C)    Resp 19    Ht 5' 4\" (1.626 m)    Wt 76.3 kg (168 lb 3.4 oz)    SpO2 90%    BMI 28.87 kg/m2      Tmax/24hrs: Temp (24hrs), Av.3 °F (36.3 °C), Min:97 °F (36.1 °C), Max:97.6 °F (36.4 °C)    Input/Output:     Intake/Output Summary (Last 24 hours) at 08/15/18 1821  Last data filed at 08/15/18 181   Gross per 24 hour   Intake                0 ml   Output             2360 ml   Net            -2360 ml       General:  In bed, eyes closed. NAD. Cardiovascular:  RRR. Pulmonary:  CTA B ant with coarse BSounds. GI:  Soft, no apparent TTP. ND. NABS. Extremities:  No CT or edema. Additional:      Labs:    No results found for this or any previous visit (from the past 24 hour(s)).   Additional Data Reviewed:      Signed By: Marge Isaac MD     August 15, 2018 3:29 PM

## 2018-08-15 NOTE — PROGRESS NOTES
conducted an initial consultation and Spiritual Assessment for Bernabe Lee, who is a [de-identified] y.o.,female. Patients Primary Language is: [place]. According to the patients EMR Pentecostalism Affiliation is: Franky Tolliver. The reason the Patient came to the hospital is:   Patient Active Problem List    Diagnosis Date Noted    Dislocation of internal right hip prosthesis ([facility] 08/09/2018    Dislocated hip, right, initial encounter ([facility] 08/09/2018    Bilateral congenital primary hydronephrosis 07/25/2018    Vesicoureteral reflux 07/25/2018    Urinary retention 07/25/2018    Gross hematuria 07/23/2018    Type 2 diabetes with nephropathy ([facility] 06/17/2018    Hyponatremia 06/08/2018    Hypokalemia 06/08/2018    Pulmonary nodule 06/08/2018    Type 2 diabetes mellitus with diabetic neuropathy ([facility] 03/19/2018    Atrophic vaginitis 03/10/2017    Essential hypertension 11/07/2016    Type 2 diabetes mellitus without complication, without long-term current use of insulin ([facility] 11/07/2016    Osteoarthrosis, unspecified whether generalized or localized, lower leg 11/03/2014    RLS (restless legs syndrome) 06/06/2014    Left hip pain 06/06/2014    Vertigo 01/28/2011    HTN (hypertension) 01/11/2010    Hypothyroidism 01/11/2010    Hearing loss 01/11/2010    Hypercholesteremia 01/11/2010        The  provided the following Interventions:  Initiated a relationship of care and support. Explored issues of nghia, spirituality and/or Oriental orthodox needs while hospitalized. Listened empathically. Provided chaplaincy education. Provided information about Spiritual Care Services. Offered prayer and assurance of continued prayers on patient's behalf. Chart reviewed. The following outcomes were achieved:  Patient shared some information about their medical narrative and spiritual journey/beliefs. Patient processed feeling about current hospitalization.   Patient expressed gratitude for the 's visit. Assessment:  Patient did not indicate any spiritual or Latter day issues which require Spiritual Care Services interventions at this time. Patient does not have any Latter day/cultural needs that will affect patients preferences in health care. Plan:  Chaplains will continue to follow and will provide pastoral care on an as needed or requested basis.  recommends bedside caregivers page  on duty if patient shows signs of acute spiritual or emotional distress.   Nereida Conn, [address] [facility]  Spiritual Care  [phone]

## 2018-08-16 NOTE — ROUTINE PROCESS
2001 Bedside and Verbal shift change report given to LAN Gandhi (oncoming nurse) by LAN Wilcox (offgoing nurse). Report included the following information SBAR, Kardex and MAR.

## 2018-08-16 NOTE — PROGRESS NOTES
Death [address] [facility] Medicine      Patient lying in bed, mouth open, eyes closed. Pupils fixed and equal bilaterally. No response to verbal or painful stimuli. No heart or lung sounds auscultated. No carotid or peripheral pulses.     Death officially pronounced at 26, 8/16/2018        Roberta Ormond, DO, PGY I  [facility]  08/16/18  7:52 AM

## 2018-08-16 NOTE — PROGRESS NOTES
responded to Death of  Virgen Hughes, who was a [de-identified] y.o.,female,     The  provided the following Interventions:  Waiting for family to arrive. Chart reviewed. Plan:  Chaplains will continue to follow and will provide pastoral care on an as needed/requested basis and grief support for the family.       [address] Certified [address] [place] [address]   [phone]

## 2018-08-16 NOTE — PROGRESS NOTES
Bereavement Note:     responded to the death of Horace La, who is a [de-identified] y.o., female, offering Spiritual Care to patient and family, see flow sheets for interventions. Date of Death: 2018    Extended Emergency Contact Information  Primary Emergency Contact: [address] Phone: [phone]  Mobile Phone: [phone]  Relation: Daughter  Secondary Emergency Contact: [address] [facility] Phone: [phone]  Relation: Child                 YES      NO  UNKNOWN  Life Net   []        []    [x]   Eye Bank   [] [] [x]   Medical Examiner  []        []  [x]   Going to [employer]  []        [] [x]      Autopsy   []        []         [x]   Sympathy Card  [x]        []  Bereavement Materials  [x]        []           Business Card Provided  []        [x]              Home: Javier Denita will continue to follow family and will provide spiritual care as needed.      Carrie Mixon, [address]  Spiritual Care  [phone]

## 2018-08-16 NOTE — ROUTINE PROCESS
5093 assumed care of pt after bedside verbal report ws given by off going nurse, pt asleep in bed quietly, no acute distress ntoed

## 2018-08-17 LAB
BACTERIA SPEC CULT: ABNORMAL
SERVICE CMNT-IMP: ABNORMAL

## 2018-08-18 LAB
BACTERIA SPEC CULT: NORMAL
BACTERIA SPEC CULT: NORMAL
SERVICE CMNT-IMP: NORMAL
SERVICE CMNT-IMP: NORMAL

## 2025-01-09 NOTE — ROUTINE PROCESS
Bedside and Verbal shift change report given to dory asencio rn (oncoming nurse) by Lilian Armendariz RN (offgoing nurse). Report included the following information SBAR, Kardex, MAR and Recent Results.     SITUATION:    Code Status: DNR   Reason for Admission: DX   Dislocated hip, right, initial encounter [facility])    [facility] day: 4   Problem List:       Hospital Problems  Date Reviewed: 8/7/2018          Codes Class Noted POA    * (Principal)Dislocation of internal right hip prosthesis ([facility] ICD-10-CM: T84.020A  ICD-9-CM: 996.42, V43.64  8/9/2018 Unknown        Dislocated hip, right, initial encounter [facility]) ICD-10-CM: S73.004A  ICD-9-CM: 835.00  8/9/2018 Unknown        Type 2 diabetes mellitus without complication, without long-term current use of insulin ([facility] ICD-10-CM: E11.9  ICD-9-CM: 250.00  11/7/2016 Yes        Vertigo ICD-10-CM: R42  ICD-9-CM: 780.4  1/28/2011 Yes    Overview Signed 1/28/2011  9:23 AM by Braxton Leslie NP     Currently under care Dr. Fatou Burks MRI brain normal.             HTN (hypertension) ICD-10-CM: I10  ICD-9-CM: 401.9  1/11/2010 Yes        Hearing loss ICD-10-CM: H91.90  ICD-9-CM: 389.9  1/11/2010 Yes              BACKGROUND:    Past Medical History:   Past Medical History:   Diagnosis Date    Allergies     Diabetes mellitus ([facility] 1/11/2010    Hearing loss 1/11/2010    HTN (hypertension) 1/11/2010    Hypercholesteremia 1/11/2010    Hypothyroidism 1/11/2010    Macular degeneration     shanae Leary     Left femural neck    Otosclerosis     Left ear    Panic attack     Thyroid disease     hypothyroid    Unspecified adverse effect of anesthesia     hard to wake up in the past. Had own blood transfusion and was better         Patient taking anticoagulants yes     ASSESSMENT:    Changes in Assessment Throughout Shift: vomited & family to come in for conference     Patient has Central Line: yes Reasons if yes: unstable   Patient has Navarro Cath: yes Reasons if yes: purulent urine with previous inability to void      Last Vitals:     Vitals:    08/13/18 1019 08/13/18 1044 08/13/18 1046 08/13/18 1200   BP:       Pulse:       Resp:       Temp:    99.6 °F (37.6 °C)   SpO2: 96% 92% 93%    Weight:       Height:            IV and DRAINS (will only show if present)   [REMOVED] Peripheral IV 08/09/18 Left Arm-Site Assessment: Clean, dry, & intact  Triple Lumen 08/12/18-Site Assessment: Dry, Intact     WOUND (if present)   Wound Type:  none   Dressing present Dressing Present : Yes   Wound Concerns/Notes:  none     PAIN    Pain Assessment    Pain Intensity 1: 0 (08/12/18 0433)    Pain Location 1: Incisional, Hip    Pain Intervention(s) 1: Medication (see MAR), Ice    Patient Stated Pain Goal: 0  o Interventions for Pain:  placement  o Intervention effective: yes  o Time of last intervention: .   o Reassessment Completed: yes      Last 3 Weights:  Last 3 Recorded Weights in this Encounter    08/09/18 1333 08/12/18 0937 08/13/18 0000   Weight: 70.3 kg (155 lb) 70.3 kg (155 lb) 76.3 kg (168 lb 3.4 oz)     Weight change:      INTAKE/OUPUT    Current Shift: 08/13 0701 - 08/13 1900  In: 4079.3 [I.V.:4079.3]  Out: 300 [Urine:300]    Last three shifts: 08/11 1901 - 08/13 0700  In: 1335.6 [P.O.:360; I.V.:975.6]  Out: 700 [Urine:700]     LAB RESULTS     Recent Labs      08/13/18   0235  08/12/18   0741   WBC  16.5*  13.5*   HGB  10.7*  11.4*   HCT  31.9*  31.9*   PLT  335  253        Recent Labs      08/13/18   0235  08/12/18   2030  08/12/18   0741   NA  130*  128*  128*   K  4.1  4.4  3.9   GLU  297*  319*  102*   BUN  51*  49*  50*   CREA  2.17*  2.47*  2.76*   CA  7.9*  7.2*  8.0*   MG  1.9   --   1.6       RECOMMENDATIONS AND DISCHARGE PLANNING     1. Pending tests/procedures/ Plan of Care or Other Needs: family decisions     2. Discharge plan for patient and Needs/Barriers: unstable    3.  Estimated Discharge Date: pending Posted on Whiteboard in [address] [place] [address] Room: yes 4. The patient's care plan was reviewed with the oncoming nurse. \"HEALS\" SAFETY CHECK      Fall Risk    Total Score: 6    Safety Measures: Safety Measures: Bed/Chair-Wheels locked, Bed in low position (unable to use call light)    A safety check occurred in the patient's room between off going nurse and oncoming nurse listed above. The safety check included the below items  Area Items   H  High Alert Medications - Verify all high alert medication drips (heparin, PCA, etc.)   E  Equipment - Suction is set up for ALL patients (with fermin)  - Red plugs utilized for all equipment (IV pumps, etc.)  - WOWs wiped down at end of shift.  - Room stocked with oxygen, suction, and other unit-specific supplies   A  Alarms - Bed alarm is set for fall risk patients  - Ensure chair alarm is in place and activated if patient is up in a chair   L  Lines - Check IV for any infiltration  - Navarro bag is empty if patient has a Navarro   - Tubing and IV bags are labeled   S  Safety   - Room is clean, patient is clean, and equipment is clean. - Hallways are clear from equipment besides carts. - Fall bracelet on for fall risk patients  - Ensure room is clear and free of clutter  - Suction is set up for ALL patients (with fermin)  - Hallways are clear from equipment besides carts.    - Isolation precautions followed, supplies available outside room, sign posted     Richard Jessica RN Adult

## (undated) DEVICE — KIT CLN UP BON SECOURS MARYV

## (undated) DEVICE — ABDOMINAL PAD: Brand: DERMACEA

## (undated) DEVICE — TRAY PREP DRY W/ PREM GLV 2 APPL 6 SPNG 2 UNDPD 1 OVERWRAP

## (undated) DEVICE — Z DUP USE 2565107 PACK SURG PROC LEG CYSTO T-DRAPE REINF TBL CVR HND TWL

## (undated) DEVICE — CATH URET 5FRX70CM W/OPN END -- BX/20

## (undated) DEVICE — AIRLIFE™ NASAL OXYGEN CANNULA CURVED, NONFLARED TIP WITH 14 FOOT (4.3 M) CRUSH-RESISTANT TUBING, OVER-THE-EAR STYLE: Brand: AIRLIFE™

## (undated) DEVICE — SOLUTION IV 1000ML 0.9% SOD CHL

## (undated) DEVICE — FLEX ADVANTAGE 3000CC: Brand: FLEX ADVANTAGE

## (undated) DEVICE — LUB SURG MEDC STRL 2OZ TUBE MC -- MEDICHOICE

## (undated) DEVICE — SOLUTION IRRIG 3000ML 0.9% SOD CHL FLX CONT 0797208] ICU MEDICAL INC]

## (undated) DEVICE — GAUZE SPONGES,16 PLY: Brand: CURITY

## (undated) DEVICE — BAG DRAINAGE CUST DISP

## (undated) DEVICE — Device

## (undated) DEVICE — KENDALL SCD EXPRESS SLEEVES, KNEE LENGTH, MEDIUM: Brand: KENDALL SCD

## (undated) DEVICE — GDWIRE URET STR STD .038X150 -- ZIPWIRE STD

## (undated) DEVICE — Y-TYPE TUR/BLADDER IRRIGATION SET, REGULATING CLAMP

## (undated) DEVICE — 3M™ BAIR PAWS FLEX™ WARMING GOWN, STANDARD, 20 PER CASE 81003: Brand: BAIR PAWS™

## (undated) DEVICE — CATHETER URET L65CM DIA5FR BERN IMAGER II

## (undated) DEVICE — URINARY LEG BAG COMBINATION PACK: Brand: HOLLISTER

## (undated) DEVICE — MEDI-VAC NON-CONDUCTIVE SUCTION TUBING: Brand: CARDINAL HEALTH

## (undated) DEVICE — SYR 10ML LUER LOK 1/5ML GRAD --

## (undated) DEVICE — STER SINGLE BASIN SET W/BOWLS: Brand: CARDINAL HEALTH

## (undated) DEVICE — DEVICE SECUREMENT AD W/ TRICOT ANCHR PD FOR F LTX SIL CATH

## (undated) DEVICE — SYR LR LCK 1ML GRAD NSAF 30ML --

## (undated) DEVICE — GOWN,PREVENTION PLUS,XLN/XL,ST,24/CS: Brand: MEDLINE

## (undated) DEVICE — CATHETER F BLLN 5CC 18FR 2 W HYDRGEL COAT LESS TRAUM LUB